# Patient Record
Sex: FEMALE | Race: BLACK OR AFRICAN AMERICAN | NOT HISPANIC OR LATINO | Employment: FULL TIME | RURAL
[De-identification: names, ages, dates, MRNs, and addresses within clinical notes are randomized per-mention and may not be internally consistent; named-entity substitution may affect disease eponyms.]

---

## 2017-03-30 ENCOUNTER — HISTORICAL (OUTPATIENT)
Dept: ADMINISTRATIVE | Facility: HOSPITAL | Age: 32
End: 2017-03-30

## 2017-03-31 LAB
LAB AP CLINICAL INFORMATION: NORMAL
LAB AP DIAGNOSIS - HISTORICAL: NORMAL
LAB AP GROSS PATHOLOGY - HISTORICAL: NORMAL
LAB AP SPECIMEN SUBMITTED - HISTORICAL: NORMAL

## 2021-04-08 ENCOUNTER — OFFICE VISIT (OUTPATIENT)
Dept: PAIN MEDICINE | Facility: CLINIC | Age: 36
End: 2021-04-08
Payer: COMMERCIAL

## 2021-04-08 VITALS
HEART RATE: 69 BPM | SYSTOLIC BLOOD PRESSURE: 125 MMHG | HEIGHT: 66 IN | WEIGHT: 166.38 LBS | RESPIRATION RATE: 20 BRPM | DIASTOLIC BLOOD PRESSURE: 73 MMHG | BODY MASS INDEX: 26.74 KG/M2

## 2021-04-08 DIAGNOSIS — G89.29 CHRONIC PAIN OF BOTH KNEES: Primary | Chronic | ICD-10-CM

## 2021-04-08 DIAGNOSIS — M25.562 CHRONIC PAIN OF BOTH KNEES: Primary | Chronic | ICD-10-CM

## 2021-04-08 DIAGNOSIS — G89.4 CHRONIC PAIN SYNDROME: Chronic | ICD-10-CM

## 2021-04-08 DIAGNOSIS — M79.10 MYALGIA: Chronic | ICD-10-CM

## 2021-04-08 DIAGNOSIS — M25.561 CHRONIC PAIN OF BOTH KNEES: Primary | Chronic | ICD-10-CM

## 2021-04-08 DIAGNOSIS — M54.50 CHRONIC BILATERAL LOW BACK PAIN WITHOUT SCIATICA: Chronic | ICD-10-CM

## 2021-04-08 DIAGNOSIS — G89.29 CHRONIC BILATERAL LOW BACK PAIN WITHOUT SCIATICA: Chronic | ICD-10-CM

## 2021-04-08 PROCEDURE — 99214 OFFICE O/P EST MOD 30 MIN: CPT | Mod: S$PBB,ICN,, | Performed by: PHYSICIAN ASSISTANT

## 2021-04-08 PROCEDURE — 99999 PR PBB SHADOW E&M-EST. PATIENT-LVL IV: CPT | Mod: PBBFAC,,, | Performed by: PHYSICIAN ASSISTANT

## 2021-04-08 PROCEDURE — 99999 PR PBB SHADOW E&M-EST. PATIENT-LVL IV: ICD-10-PCS | Mod: PBBFAC,,, | Performed by: PHYSICIAN ASSISTANT

## 2021-04-08 PROCEDURE — 99214 PR OFFICE/OUTPT VISIT, EST, LEVL IV, 30-39 MIN: ICD-10-PCS | Mod: S$PBB,ICN,, | Performed by: PHYSICIAN ASSISTANT

## 2021-04-08 PROCEDURE — 99214 OFFICE O/P EST MOD 30 MIN: CPT | Mod: PBBFAC | Performed by: PHYSICIAN ASSISTANT

## 2021-04-08 RX ORDER — PREGABALIN 50 MG/1
50 CAPSULE ORAL EVERY 12 HOURS
Qty: 60 CAPSULE | Refills: 2 | Status: SHIPPED | OUTPATIENT
Start: 2021-04-08 | End: 2021-07-08 | Stop reason: SDUPTHER

## 2021-04-08 RX ORDER — MIRTAZAPINE 30 MG/1
30 TABLET, FILM COATED ORAL
COMMUNITY
End: 2024-02-12 | Stop reason: SDUPTHER

## 2021-04-08 RX ORDER — ACETAMINOPHEN AND CODEINE PHOSPHATE 300; 30 MG/1; MG/1
1 TABLET ORAL 3 TIMES DAILY PRN
COMMUNITY
End: 2023-01-23 | Stop reason: SDUPTHER

## 2021-04-08 RX ORDER — ALBUTEROL SULFATE 90 UG/1
2 AEROSOL, METERED RESPIRATORY (INHALATION) EVERY 6 HOURS PRN
COMMUNITY

## 2021-04-08 RX ORDER — FAMOTIDINE 40 MG/1
40 TABLET, FILM COATED ORAL
COMMUNITY
End: 2023-11-03

## 2021-04-08 RX ORDER — OMEPRAZOLE 40 MG/1
40 CAPSULE, DELAYED RELEASE ORAL
COMMUNITY
End: 2023-11-03

## 2021-04-08 RX ORDER — ONDANSETRON 4 MG/1
4 TABLET, ORALLY DISINTEGRATING ORAL
COMMUNITY
End: 2024-02-27 | Stop reason: SDUPTHER

## 2021-04-08 RX ORDER — CELECOXIB 200 MG/1
200 CAPSULE ORAL DAILY
Qty: 30 CAPSULE | Refills: 2 | Status: SHIPPED | OUTPATIENT
Start: 2021-04-08 | End: 2021-07-08 | Stop reason: SDUPTHER

## 2021-04-08 RX ORDER — DULOXETIN HYDROCHLORIDE 30 MG/1
30 CAPSULE, DELAYED RELEASE ORAL DAILY
Qty: 30 CAPSULE | Refills: 2 | Status: SHIPPED | OUTPATIENT
Start: 2021-04-08 | End: 2021-07-08 | Stop reason: SDUPTHER

## 2021-07-08 ENCOUNTER — OFFICE VISIT (OUTPATIENT)
Dept: PAIN MEDICINE | Facility: CLINIC | Age: 36
End: 2021-07-08
Payer: COMMERCIAL

## 2021-07-08 VITALS
DIASTOLIC BLOOD PRESSURE: 80 MMHG | SYSTOLIC BLOOD PRESSURE: 123 MMHG | RESPIRATION RATE: 18 BRPM | WEIGHT: 166 LBS | HEIGHT: 66 IN | BODY MASS INDEX: 26.68 KG/M2 | HEART RATE: 75 BPM

## 2021-07-08 DIAGNOSIS — M79.10 MYALGIA: Chronic | ICD-10-CM

## 2021-07-08 DIAGNOSIS — M54.50 CHRONIC BILATERAL LOW BACK PAIN WITHOUT SCIATICA: Chronic | ICD-10-CM

## 2021-07-08 DIAGNOSIS — G89.29 CHRONIC BILATERAL LOW BACK PAIN WITHOUT SCIATICA: Chronic | ICD-10-CM

## 2021-07-08 DIAGNOSIS — M54.12 RADICULOPATHY OF CERVICAL REGION: Chronic | ICD-10-CM

## 2021-07-08 DIAGNOSIS — M25.562 CHRONIC PAIN OF BOTH KNEES: Primary | Chronic | ICD-10-CM

## 2021-07-08 DIAGNOSIS — G89.29 CHRONIC PAIN OF BOTH KNEES: Primary | Chronic | ICD-10-CM

## 2021-07-08 DIAGNOSIS — M25.561 CHRONIC PAIN OF BOTH KNEES: Primary | Chronic | ICD-10-CM

## 2021-07-08 PROCEDURE — 99215 OFFICE O/P EST HI 40 MIN: CPT | Mod: PBBFAC | Performed by: PHYSICIAN ASSISTANT

## 2021-07-08 PROCEDURE — 99214 PR OFFICE/OUTPT VISIT, EST, LEVL IV, 30-39 MIN: ICD-10-PCS | Mod: S$PBB,,, | Performed by: PHYSICIAN ASSISTANT

## 2021-07-08 PROCEDURE — 99214 OFFICE O/P EST MOD 30 MIN: CPT | Mod: S$PBB,,, | Performed by: PHYSICIAN ASSISTANT

## 2021-07-08 RX ORDER — PANTOPRAZOLE SODIUM 40 MG/1
40 TABLET, DELAYED RELEASE ORAL DAILY
COMMUNITY
End: 2024-02-27 | Stop reason: SDUPTHER

## 2021-07-08 RX ORDER — CELECOXIB 200 MG/1
200 CAPSULE ORAL DAILY
Qty: 30 CAPSULE | Refills: 0 | Status: SHIPPED | OUTPATIENT
Start: 2021-07-08 | End: 2021-08-07

## 2021-07-08 RX ORDER — PREGABALIN 50 MG/1
50 CAPSULE ORAL EVERY 12 HOURS
Qty: 60 CAPSULE | Refills: 0 | Status: SHIPPED | OUTPATIENT
Start: 2021-07-08 | End: 2022-11-30

## 2021-07-08 RX ORDER — DULOXETIN HYDROCHLORIDE 30 MG/1
30 CAPSULE, DELAYED RELEASE ORAL DAILY
Qty: 30 CAPSULE | Refills: 0 | Status: SHIPPED | OUTPATIENT
Start: 2021-07-08 | End: 2022-11-30

## 2021-07-30 ENCOUNTER — HOSPITAL ENCOUNTER (OUTPATIENT)
Dept: RADIOLOGY | Facility: HOSPITAL | Age: 36
Discharge: HOME | End: 2021-07-30
Attending: NURSE PRACTITIONER
Payer: COMMERCIAL

## 2021-07-30 DIAGNOSIS — Z11.1 SCREENING-PULMONARY TB: ICD-10-CM

## 2021-07-30 PROCEDURE — 71045 XR CHEST 1 VIEW: ICD-10-PCS | Mod: 26,,, | Performed by: RADIOLOGY

## 2021-07-30 PROCEDURE — 71045 X-RAY EXAM CHEST 1 VIEW: CPT | Mod: TC

## 2021-07-30 PROCEDURE — 71045 X-RAY EXAM CHEST 1 VIEW: CPT | Mod: 26,,, | Performed by: RADIOLOGY

## 2021-10-03 ENCOUNTER — HOSPITAL ENCOUNTER (EMERGENCY)
Facility: HOSPITAL | Age: 36
Discharge: HOME | End: 2021-10-03
Payer: COMMERCIAL

## 2021-10-03 VITALS
HEIGHT: 66 IN | TEMPERATURE: 98 F | HEART RATE: 73 BPM | OXYGEN SATURATION: 95 % | DIASTOLIC BLOOD PRESSURE: 84 MMHG | RESPIRATION RATE: 15 BRPM | BODY MASS INDEX: 24.91 KG/M2 | WEIGHT: 155 LBS | SYSTOLIC BLOOD PRESSURE: 123 MMHG

## 2021-10-03 DIAGNOSIS — M25.562 CHRONIC PAIN OF BOTH KNEES: Chronic | ICD-10-CM

## 2021-10-03 DIAGNOSIS — M79.10 MYALGIA: Chronic | ICD-10-CM

## 2021-10-03 DIAGNOSIS — M54.50 CHRONIC BILATERAL LOW BACK PAIN WITHOUT SCIATICA: Chronic | ICD-10-CM

## 2021-10-03 DIAGNOSIS — M54.12 RADICULOPATHY OF CERVICAL REGION: Chronic | ICD-10-CM

## 2021-10-03 DIAGNOSIS — G89.29 CHRONIC BILATERAL LOW BACK PAIN WITHOUT SCIATICA: Chronic | ICD-10-CM

## 2021-10-03 DIAGNOSIS — G89.29 CHRONIC PAIN OF BOTH KNEES: Chronic | ICD-10-CM

## 2021-10-03 DIAGNOSIS — G89.4 CHRONIC PAIN SYNDROME: Chronic | ICD-10-CM

## 2021-10-03 DIAGNOSIS — L73.9 FOLLICULITIS OF PERINEUM: ICD-10-CM

## 2021-10-03 DIAGNOSIS — J06.9 ACUTE UPPER RESPIRATORY INFECTION: Primary | ICD-10-CM

## 2021-10-03 DIAGNOSIS — M25.561 CHRONIC PAIN OF BOTH KNEES: Chronic | ICD-10-CM

## 2021-10-03 LAB
BILIRUB UR QL STRIP: NEGATIVE
CLARITY UR: CLEAR
COLOR UR: YELLOW
FLUAV AG UPPER RESP QL IA.RAPID: NEGATIVE
FLUBV AG UPPER RESP QL IA.RAPID: NEGATIVE
GLUCOSE UR STRIP-MCNC: NEGATIVE MG/DL
KETONES UR STRIP-SCNC: ABNORMAL MG/DL
LEUKOCYTE ESTERASE UR QL STRIP: NEGATIVE
NITRITE UR QL STRIP: NEGATIVE
PH UR STRIP: 5.5 PH UNITS
PROT UR QL STRIP: NEGATIVE
RBC # UR STRIP: NEGATIVE /UL
SARS-COV+SARS-COV-2 AG RESP QL IA.RAPID: NEGATIVE
SP GR UR STRIP: >=1.03
UROBILINOGEN UR STRIP-ACNC: 0.2 MG/DL

## 2021-10-03 PROCEDURE — 99283 EMERGENCY DEPT VISIT LOW MDM: CPT

## 2021-10-03 PROCEDURE — 87428 SARSCOV & INF VIR A&B AG IA: CPT | Performed by: NURSE PRACTITIONER

## 2021-10-03 PROCEDURE — 81003 URINALYSIS AUTO W/O SCOPE: CPT | Performed by: NURSE PRACTITIONER

## 2021-10-03 PROCEDURE — 99283 PR EMERGENCY DEPT VISIT,LEVEL III: ICD-10-PCS | Mod: ,,, | Performed by: NURSE PRACTITIONER

## 2021-10-03 PROCEDURE — 99283 EMERGENCY DEPT VISIT LOW MDM: CPT | Mod: ,,, | Performed by: NURSE PRACTITIONER

## 2021-10-03 RX ORDER — DEXAMETHASONE SODIUM PHOSPHATE 4 MG/ML
4 INJECTION, SOLUTION INTRA-ARTICULAR; INTRALESIONAL; INTRAMUSCULAR; INTRAVENOUS; SOFT TISSUE
Status: DISCONTINUED | OUTPATIENT
Start: 2021-10-03 | End: 2021-10-03

## 2021-10-03 RX ORDER — CEFDINIR 300 MG/1
300 CAPSULE ORAL 2 TIMES DAILY
Qty: 20 CAPSULE | Refills: 0 | Status: SHIPPED | OUTPATIENT
Start: 2021-10-03 | End: 2021-10-13

## 2021-10-28 ENCOUNTER — OFFICE VISIT (OUTPATIENT)
Dept: FAMILY MEDICINE | Facility: CLINIC | Age: 36
End: 2021-10-28
Payer: COMMERCIAL

## 2021-10-28 VITALS
DIASTOLIC BLOOD PRESSURE: 77 MMHG | HEIGHT: 66 IN | TEMPERATURE: 97 F | OXYGEN SATURATION: 98 % | BODY MASS INDEX: 25.81 KG/M2 | SYSTOLIC BLOOD PRESSURE: 120 MMHG | WEIGHT: 160.63 LBS | RESPIRATION RATE: 19 BRPM | HEART RATE: 70 BPM

## 2021-10-28 DIAGNOSIS — R39.9 UTI SYMPTOMS: Primary | ICD-10-CM

## 2021-10-28 DIAGNOSIS — K59.00 CONSTIPATION, UNSPECIFIED CONSTIPATION TYPE: ICD-10-CM

## 2021-10-28 DIAGNOSIS — Z11.3 SCREEN FOR STD (SEXUALLY TRANSMITTED DISEASE): ICD-10-CM

## 2021-10-28 DIAGNOSIS — R10.30 LOWER ABDOMINAL PAIN: ICD-10-CM

## 2021-10-28 LAB
ALBUMIN SERPL BCP-MCNC: 4 G/DL (ref 3.5–5)
ALBUMIN/GLOB SERPL: 1 {RATIO}
ALP SERPL-CCNC: 137 U/L (ref 37–98)
ALT SERPL W P-5'-P-CCNC: 24 U/L (ref 13–56)
ANION GAP SERPL CALCULATED.3IONS-SCNC: 8 MMOL/L (ref 7–16)
AST SERPL W P-5'-P-CCNC: 17 U/L (ref 15–37)
BASOPHILS # BLD AUTO: 0.01 K/UL (ref 0–0.2)
BASOPHILS NFR BLD AUTO: 0.2 % (ref 0–1)
BILIRUB SERPL-MCNC: 0.2 MG/DL (ref 0–1.2)
BILIRUB SERPL-MCNC: NEGATIVE MG/DL
BLOOD URINE, POC: NEGATIVE
BUN SERPL-MCNC: 10 MG/DL (ref 7–18)
BUN/CREAT SERPL: 12 (ref 6–20)
CALCIUM SERPL-MCNC: 9.9 MG/DL (ref 8.5–10.1)
CHLORIDE SERPL-SCNC: 106 MMOL/L (ref 98–107)
CO2 SERPL-SCNC: 29 MMOL/L (ref 21–32)
COLOR, POC UA: YELLOW
CREAT SERPL-MCNC: 0.86 MG/DL (ref 0.55–1.02)
DIFFERENTIAL METHOD BLD: ABNORMAL
EOSINOPHIL # BLD AUTO: 0.02 K/UL (ref 0–0.5)
EOSINOPHIL NFR BLD AUTO: 0.5 % (ref 1–4)
ERYTHROCYTE [DISTWIDTH] IN BLOOD BY AUTOMATED COUNT: 13.6 % (ref 11.5–14.5)
GLOBULIN SER-MCNC: 4.2 G/DL (ref 2–4)
GLUCOSE SERPL-MCNC: 92 MG/DL (ref 74–106)
GLUCOSE UR QL STRIP: NEGATIVE
HCT VFR BLD AUTO: 40.5 % (ref 38–47)
HGB BLD-MCNC: 12.2 G/DL (ref 12–16)
HIV 1+O+2 AB SERPL QL: NORMAL
IMM GRANULOCYTES # BLD AUTO: 0 K/UL (ref 0–0.04)
IMM GRANULOCYTES NFR BLD: 0 % (ref 0–0.4)
KETONES UR QL STRIP: NEGATIVE
LEUKOCYTE ESTERASE URINE, POC: NEGATIVE
LYMPHOCYTES # BLD AUTO: 1.79 K/UL (ref 1–4.8)
LYMPHOCYTES NFR BLD AUTO: 43.2 % (ref 27–41)
MCH RBC QN AUTO: 22.7 PG (ref 27–31)
MCHC RBC AUTO-ENTMCNC: 30.1 G/DL (ref 32–36)
MCV RBC AUTO: 75.3 FL (ref 80–96)
MONOCYTES # BLD AUTO: 0.23 K/UL (ref 0–0.8)
MONOCYTES NFR BLD AUTO: 5.6 % (ref 2–6)
MPC BLD CALC-MCNC: 10.1 FL (ref 9.4–12.4)
NEUTROPHILS # BLD AUTO: 2.09 K/UL (ref 1.8–7.7)
NEUTROPHILS NFR BLD AUTO: 50.5 % (ref 53–65)
NITRITE, POC UA: NEGATIVE
NRBC # BLD AUTO: 0 X10E3/UL
NRBC, AUTO (.00): 0 %
PH, POC UA: 5.5
PLATELET # BLD AUTO: 370 K/UL (ref 150–400)
POTASSIUM SERPL-SCNC: 4.9 MMOL/L (ref 3.5–5.1)
PROT SERPL-MCNC: 8.2 G/DL (ref 6.4–8.2)
PROTEIN, POC: NEGATIVE
RBC # BLD AUTO: 5.38 M/UL (ref 4.2–5.4)
SODIUM SERPL-SCNC: 138 MMOL/L (ref 136–145)
SPECIFIC GRAVITY, POC UA: 1.02
SYPHILIS AB INTERPRETATION: NORMAL
UROBILINOGEN, POC UA: 0.2
WBC # BLD AUTO: 4.14 K/UL (ref 4.5–11)

## 2021-10-28 PROCEDURE — 3008F BODY MASS INDEX DOCD: CPT | Mod: CPTII,,, | Performed by: NURSE PRACTITIONER

## 2021-10-28 PROCEDURE — 81003 URINALYSIS AUTO W/O SCOPE: CPT | Mod: QW,,, | Performed by: NURSE PRACTITIONER

## 2021-10-28 PROCEDURE — 81003 POCT URINALYSIS W/O SCOPE: ICD-10-PCS | Mod: QW,,, | Performed by: NURSE PRACTITIONER

## 2021-10-28 PROCEDURE — 3074F PR MOST RECENT SYSTOLIC BLOOD PRESSURE < 130 MM HG: ICD-10-PCS | Mod: CPTII,,, | Performed by: NURSE PRACTITIONER

## 2021-10-28 PROCEDURE — 85025 CBC WITH DIFFERENTIAL: ICD-10-PCS | Mod: ,,, | Performed by: CLINICAL MEDICAL LABORATORY

## 2021-10-28 PROCEDURE — 86780 TREPONEMA PALLIDUM: CPT | Mod: ,,, | Performed by: CLINICAL MEDICAL LABORATORY

## 2021-10-28 PROCEDURE — 86780 TREPONEMA PALLIDUM (SYPHILIS) ANTIBODY: ICD-10-PCS | Mod: ,,, | Performed by: CLINICAL MEDICAL LABORATORY

## 2021-10-28 PROCEDURE — 87389 HIV-1 AG W/HIV-1&-2 AB AG IA: CPT | Mod: ,,, | Performed by: CLINICAL MEDICAL LABORATORY

## 2021-10-28 PROCEDURE — 86696 HERPES SIMPLEX TYPE 2 TEST: CPT | Mod: ,,, | Performed by: CLINICAL MEDICAL LABORATORY

## 2021-10-28 PROCEDURE — 86695 HERPES SIMPLEX TYPE 1 TEST: CPT | Mod: ,,, | Performed by: CLINICAL MEDICAL LABORATORY

## 2021-10-28 PROCEDURE — 87491 CHLMYD TRACH DNA AMP PROBE: CPT | Mod: ,,, | Performed by: CLINICAL MEDICAL LABORATORY

## 2021-10-28 PROCEDURE — 87591 N.GONORRHOEAE DNA AMP PROB: CPT | Mod: ,,, | Performed by: CLINICAL MEDICAL LABORATORY

## 2021-10-28 PROCEDURE — 3078F PR MOST RECENT DIASTOLIC BLOOD PRESSURE < 80 MM HG: ICD-10-PCS | Mod: CPTII,,, | Performed by: NURSE PRACTITIONER

## 2021-10-28 PROCEDURE — 86695 HERPES SIMPLEX 1 & 2 IGG: ICD-10-PCS | Mod: ,,, | Performed by: CLINICAL MEDICAL LABORATORY

## 2021-10-28 PROCEDURE — 99204 OFFICE O/P NEW MOD 45 MIN: CPT | Mod: ,,, | Performed by: NURSE PRACTITIONER

## 2021-10-28 PROCEDURE — 87389 HIV 1 / 2 ANTIBODY: ICD-10-PCS | Mod: ,,, | Performed by: CLINICAL MEDICAL LABORATORY

## 2021-10-28 PROCEDURE — 87086 URINE CULTURE/COLONY COUNT: CPT | Mod: ,,, | Performed by: CLINICAL MEDICAL LABORATORY

## 2021-10-28 PROCEDURE — 3078F DIAST BP <80 MM HG: CPT | Mod: CPTII,,, | Performed by: NURSE PRACTITIONER

## 2021-10-28 PROCEDURE — 1159F PR MEDICATION LIST DOCUMENTED IN MEDICAL RECORD: ICD-10-PCS | Mod: CPTII,,, | Performed by: NURSE PRACTITIONER

## 2021-10-28 PROCEDURE — 1160F PR REVIEW ALL MEDS BY PRESCRIBER/CLIN PHARMACIST DOCUMENTED: ICD-10-PCS | Mod: CPTII,,, | Performed by: NURSE PRACTITIONER

## 2021-10-28 PROCEDURE — 87086 CULTURE, URINE: ICD-10-PCS | Mod: ,,, | Performed by: CLINICAL MEDICAL LABORATORY

## 2021-10-28 PROCEDURE — 3008F PR BODY MASS INDEX (BMI) DOCUMENTED: ICD-10-PCS | Mod: CPTII,,, | Performed by: NURSE PRACTITIONER

## 2021-10-28 PROCEDURE — 3074F SYST BP LT 130 MM HG: CPT | Mod: CPTII,,, | Performed by: NURSE PRACTITIONER

## 2021-10-28 PROCEDURE — 80053 COMPREHENSIVE METABOLIC PANEL: ICD-10-PCS | Mod: ,,, | Performed by: CLINICAL MEDICAL LABORATORY

## 2021-10-28 PROCEDURE — 80053 COMPREHEN METABOLIC PANEL: CPT | Mod: ,,, | Performed by: CLINICAL MEDICAL LABORATORY

## 2021-10-28 PROCEDURE — 1160F RVW MEDS BY RX/DR IN RCRD: CPT | Mod: CPTII,,, | Performed by: NURSE PRACTITIONER

## 2021-10-28 PROCEDURE — 87591 CHLAMYDIA/GONORRHOEAE(GC), PCR: ICD-10-PCS | Mod: ,,, | Performed by: CLINICAL MEDICAL LABORATORY

## 2021-10-28 PROCEDURE — 1159F MED LIST DOCD IN RCRD: CPT | Mod: CPTII,,, | Performed by: NURSE PRACTITIONER

## 2021-10-28 PROCEDURE — 85025 COMPLETE CBC W/AUTO DIFF WBC: CPT | Mod: ,,, | Performed by: CLINICAL MEDICAL LABORATORY

## 2021-10-28 PROCEDURE — 99204 PR OFFICE/OUTPT VISIT, NEW, LEVL IV, 45-59 MIN: ICD-10-PCS | Mod: ,,, | Performed by: NURSE PRACTITIONER

## 2021-10-28 PROCEDURE — 87491 CHLAMYDIA/GONORRHOEAE(GC), PCR: ICD-10-PCS | Mod: ,,, | Performed by: CLINICAL MEDICAL LABORATORY

## 2021-10-28 PROCEDURE — 86696 HERPES SIMPLEX 1 & 2 IGG: ICD-10-PCS | Mod: ,,, | Performed by: CLINICAL MEDICAL LABORATORY

## 2021-10-28 RX ORDER — LACTULOSE 10 G/15ML
30 SOLUTION ORAL 2 TIMES DAILY PRN
Qty: 420 ML | Refills: 0 | Status: SHIPPED | OUTPATIENT
Start: 2021-10-28 | End: 2023-11-03

## 2021-10-28 RX ORDER — SULFAMETHOXAZOLE AND TRIMETHOPRIM 800; 160 MG/1; MG/1
1 TABLET ORAL 2 TIMES DAILY
Qty: 20 TABLET | Refills: 0 | Status: SHIPPED | OUTPATIENT
Start: 2021-10-28 | End: 2021-11-07

## 2021-10-29 LAB
CHLAMYDIA BY PCR: NEGATIVE
N. GONORRHOEAE (GC) BY PCR: NEGATIVE

## 2021-10-30 LAB — UA COMPLETE W REFLEX CULTURE PNL UR: NORMAL

## 2021-11-02 LAB
HSV TYPE 1 AB IGG INDEX: 8.92
HSV TYPE 2 AB IGG INDEX: 4.58
HSV1 IGG SER QL: POSITIVE
HSV2 IGG SER QL: POSITIVE

## 2021-11-05 ENCOUNTER — CLINICAL SUPPORT (OUTPATIENT)
Dept: FAMILY MEDICINE | Facility: CLINIC | Age: 36
End: 2021-11-05

## 2022-01-25 ENCOUNTER — OFFICE VISIT (OUTPATIENT)
Dept: FAMILY MEDICINE | Facility: CLINIC | Age: 37
End: 2022-01-25
Payer: COMMERCIAL

## 2022-01-25 VITALS
HEART RATE: 71 BPM | OXYGEN SATURATION: 99 % | SYSTOLIC BLOOD PRESSURE: 118 MMHG | RESPIRATION RATE: 18 BRPM | WEIGHT: 164 LBS | DIASTOLIC BLOOD PRESSURE: 79 MMHG | TEMPERATURE: 98 F | HEIGHT: 66 IN | BODY MASS INDEX: 26.36 KG/M2

## 2022-01-25 DIAGNOSIS — N30.00 ACUTE CYSTITIS WITHOUT HEMATURIA: ICD-10-CM

## 2022-01-25 DIAGNOSIS — N89.8 VAGINAL DISCHARGE: ICD-10-CM

## 2022-01-25 DIAGNOSIS — R30.0 DYSURIA: Primary | ICD-10-CM

## 2022-01-25 DIAGNOSIS — R10.9 FLANK PAIN: ICD-10-CM

## 2022-01-25 LAB
ALBUMIN SERPL BCP-MCNC: 3.6 G/DL (ref 3.5–5)
ALBUMIN/GLOB SERPL: 0.8 {RATIO}
ALP SERPL-CCNC: 137 U/L (ref 37–98)
ALT SERPL W P-5'-P-CCNC: 28 U/L (ref 13–56)
ANION GAP SERPL CALCULATED.3IONS-SCNC: 10 MMOL/L (ref 7–16)
AST SERPL W P-5'-P-CCNC: 16 U/L (ref 15–37)
BASOPHILS # BLD AUTO: 0.01 K/UL (ref 0–0.2)
BASOPHILS NFR BLD AUTO: 0.2 % (ref 0–1)
BILIRUB SERPL-MCNC: 0.3 MG/DL (ref 0–1.2)
BILIRUB UR QL STRIP: NEGATIVE
BUN SERPL-MCNC: 9 MG/DL (ref 7–18)
BUN/CREAT SERPL: 11 (ref 6–20)
CALCIUM SERPL-MCNC: 9.6 MG/DL (ref 8.5–10.1)
CHLORIDE SERPL-SCNC: 106 MMOL/L (ref 98–107)
CLARITY UR: CLEAR
CO2 SERPL-SCNC: 31 MMOL/L (ref 21–32)
COLOR UR: YELLOW
CREAT SERPL-MCNC: 0.81 MG/DL (ref 0.55–1.02)
DIFFERENTIAL METHOD BLD: ABNORMAL
EOSINOPHIL # BLD AUTO: 0.03 K/UL (ref 0–0.5)
EOSINOPHIL NFR BLD AUTO: 0.6 % (ref 1–4)
ERYTHROCYTE [DISTWIDTH] IN BLOOD BY AUTOMATED COUNT: 14.6 % (ref 11.5–14.5)
GLOBULIN SER-MCNC: 4.6 G/DL (ref 2–4)
GLUCOSE SERPL-MCNC: 71 MG/DL (ref 74–106)
GLUCOSE UR STRIP-MCNC: NEGATIVE MG/DL
HCT VFR BLD AUTO: 40.4 % (ref 38–47)
HGB BLD-MCNC: 11.7 G/DL (ref 12–16)
HIV 1+O+2 AB SERPL QL: NORMAL
IMM GRANULOCYTES # BLD AUTO: 0.01 K/UL (ref 0–0.04)
IMM GRANULOCYTES NFR BLD: 0.2 % (ref 0–0.4)
KETONES UR STRIP-SCNC: NEGATIVE MG/DL
LEUKOCYTE ESTERASE UR QL STRIP: NEGATIVE
LYMPHOCYTES # BLD AUTO: 1.95 K/UL (ref 1–4.8)
LYMPHOCYTES NFR BLD AUTO: 41.8 % (ref 27–41)
MCH RBC QN AUTO: 22.5 PG (ref 27–31)
MCHC RBC AUTO-ENTMCNC: 29 G/DL (ref 32–36)
MCV RBC AUTO: 77.5 FL (ref 80–96)
MONOCYTES # BLD AUTO: 0.28 K/UL (ref 0–0.8)
MONOCYTES NFR BLD AUTO: 6 % (ref 2–6)
MPC BLD CALC-MCNC: 9.6 FL (ref 9.4–12.4)
NEUTROPHILS # BLD AUTO: 2.38 K/UL (ref 1.8–7.7)
NEUTROPHILS NFR BLD AUTO: 51.2 % (ref 53–65)
NITRITE UR QL STRIP: NEGATIVE
NRBC # BLD AUTO: 0 X10E3/UL
NRBC, AUTO (.00): 0 %
PH UR STRIP: 7 PH UNITS
PLATELET # BLD AUTO: 405 K/UL (ref 150–400)
POTASSIUM SERPL-SCNC: 4 MMOL/L (ref 3.5–5.1)
PROT SERPL-MCNC: 8.2 G/DL (ref 6.4–8.2)
PROT UR QL STRIP: NEGATIVE
RBC # BLD AUTO: 5.21 M/UL (ref 4.2–5.4)
RBC # UR STRIP: NEGATIVE /UL
SODIUM SERPL-SCNC: 143 MMOL/L (ref 136–145)
SP GR UR STRIP: 1.02
SYPHILIS AB INTERPRETATION: NORMAL
UROBILINOGEN UR STRIP-ACNC: 0.2 MG/DL
WBC # BLD AUTO: 4.66 K/UL (ref 4.5–11)

## 2022-01-25 PROCEDURE — 87491 CHLMYD TRACH DNA AMP PROBE: CPT | Mod: ,,, | Performed by: CLINICAL MEDICAL LABORATORY

## 2022-01-25 PROCEDURE — 86696 HERPES SIMPLEX TYPE 2 TEST: CPT | Mod: ,,, | Performed by: CLINICAL MEDICAL LABORATORY

## 2022-01-25 PROCEDURE — 86695 HERPES SIMPLEX 1 & 2 IGG: ICD-10-PCS | Mod: ,,, | Performed by: CLINICAL MEDICAL LABORATORY

## 2022-01-25 PROCEDURE — 80053 COMPREHENSIVE METABOLIC PANEL: ICD-10-PCS | Mod: ,,, | Performed by: CLINICAL MEDICAL LABORATORY

## 2022-01-25 PROCEDURE — 87389 HIV-1 AG W/HIV-1&-2 AB AG IA: CPT | Mod: ,,, | Performed by: CLINICAL MEDICAL LABORATORY

## 2022-01-25 PROCEDURE — 80053 COMPREHEN METABOLIC PANEL: CPT | Mod: ,,, | Performed by: CLINICAL MEDICAL LABORATORY

## 2022-01-25 PROCEDURE — 99214 PR OFFICE/OUTPT VISIT, EST, LEVL IV, 30-39 MIN: ICD-10-PCS | Mod: GC,,, | Performed by: FAMILY MEDICINE

## 2022-01-25 PROCEDURE — 81003 URINALYSIS AUTO W/O SCOPE: CPT | Mod: QW,,, | Performed by: CLINICAL MEDICAL LABORATORY

## 2022-01-25 PROCEDURE — 85025 COMPLETE CBC W/AUTO DIFF WBC: CPT | Mod: ,,, | Performed by: CLINICAL MEDICAL LABORATORY

## 2022-01-25 PROCEDURE — 86695 HERPES SIMPLEX TYPE 1 TEST: CPT | Mod: ,,, | Performed by: CLINICAL MEDICAL LABORATORY

## 2022-01-25 PROCEDURE — 87591 N.GONORRHOEAE DNA AMP PROB: CPT | Mod: ,,, | Performed by: CLINICAL MEDICAL LABORATORY

## 2022-01-25 PROCEDURE — 86694 HERPES SIMPLEX 1 & 2 IGM: ICD-10-PCS | Mod: ,,, | Performed by: CLINICAL MEDICAL LABORATORY

## 2022-01-25 PROCEDURE — 86780 TREPONEMA PALLIDUM: CPT | Mod: ,,, | Performed by: CLINICAL MEDICAL LABORATORY

## 2022-01-25 PROCEDURE — 87389 HIV 1 / 2 ANTIBODY: ICD-10-PCS | Mod: ,,, | Performed by: CLINICAL MEDICAL LABORATORY

## 2022-01-25 PROCEDURE — 86694 HERPES SIMPLEX NES ANTBDY: CPT | Mod: ,,, | Performed by: CLINICAL MEDICAL LABORATORY

## 2022-01-25 PROCEDURE — 87591 CHLAMYDIA/GONORRHOEAE(GC), PCR: ICD-10-PCS | Mod: ,,, | Performed by: CLINICAL MEDICAL LABORATORY

## 2022-01-25 PROCEDURE — 85025 CBC WITH DIFFERENTIAL: ICD-10-PCS | Mod: ,,, | Performed by: CLINICAL MEDICAL LABORATORY

## 2022-01-25 PROCEDURE — 99214 OFFICE O/P EST MOD 30 MIN: CPT | Mod: GC,,, | Performed by: FAMILY MEDICINE

## 2022-01-25 PROCEDURE — 81003 URINALYSIS, REFLEX TO URINE CULTURE: ICD-10-PCS | Mod: QW,,, | Performed by: CLINICAL MEDICAL LABORATORY

## 2022-01-25 PROCEDURE — 87491 CHLAMYDIA/GONORRHOEAE(GC), PCR: ICD-10-PCS | Mod: ,,, | Performed by: CLINICAL MEDICAL LABORATORY

## 2022-01-25 PROCEDURE — 86696 HERPES SIMPLEX 1 & 2 IGG: ICD-10-PCS | Mod: ,,, | Performed by: CLINICAL MEDICAL LABORATORY

## 2022-01-25 PROCEDURE — 86780 TREPONEMA PALLIDUM (SYPHILIS) ANTIBODY: ICD-10-PCS | Mod: ,,, | Performed by: CLINICAL MEDICAL LABORATORY

## 2022-01-25 RX ORDER — NITROFURANTOIN 25; 75 MG/1; MG/1
100 CAPSULE ORAL 2 TIMES DAILY
Qty: 10 CAPSULE | Refills: 0 | Status: SHIPPED | OUTPATIENT
Start: 2022-01-25 | End: 2022-01-30

## 2022-01-26 LAB
CHLAMYDIA BY PCR: NEGATIVE
N. GONORRHOEAE (GC) BY PCR: NEGATIVE

## 2022-01-27 LAB
HSV IGM SER QL IA: ABNORMAL
HSV TYPE 1 AB IGG INDEX: >8
HSV TYPE 2 AB IGG INDEX: 3.38
HSV1 IGG SER QL: POSITIVE
HSV2 IGG SER QL: POSITIVE

## 2022-02-03 PROBLEM — N30.00 ACUTE CYSTITIS WITHOUT HEMATURIA: Status: ACTIVE | Noted: 2022-02-03

## 2022-02-03 PROBLEM — R10.9 FLANK PAIN: Status: ACTIVE | Noted: 2022-02-03

## 2022-02-03 PROBLEM — N89.8 VAGINAL DISCHARGE: Status: ACTIVE | Noted: 2022-02-03

## 2022-02-03 PROBLEM — R30.0 DYSURIA: Status: ACTIVE | Noted: 2022-02-03

## 2022-02-03 NOTE — PROGRESS NOTES
"  Subjective:       Patient ID: Keshia Shepherd is a 37 y.o. female.    Chief Complaint: Dysuria, Abdominal Cramping, Pressure when urinating, and Fatigue (Symptoms presented about 2 weeks ago. )    37 y.o. female presented to the clinic c/o pain when urinating which started 2 weeks ago and is described and burning and pressure. Pt reports pain is better with warm water and worse with urinating. PT reports assiociated symptoms of fatigue, abdominal cramping, bilateral lower back pain, and flank pain. PT is also c/o white discharge and reports there is a "hard ball" in her groin. PT reports vaginal itching and discharge. Denies any N/V, fever, chills, vaginal bleeding or any other complaints at this time. Pt reports she had a yeast infection 1 year ago. A0. LNMP: reports hysterectomy. Similar symptoms previously 3 years ago. PCP at [facility]: Dr. Maurilio Navarro.      Current Outpatient Medications:     albuterol (PROVENTIL/VENTOLIN HFA) 90 mcg/actuation inhaler, Inhale 2 puffs into the lungs every 6 (six) hours as needed., Disp: , Rfl:     famotidine (PEPCID) 40 MG tablet, Take 40 mg by mouth., Disp: , Rfl:     lactulose (CHRONULAC) 20 gram/30 mL Soln, Take 45 mLs (30 g total) by mouth 2 (two) times daily as needed (constipation)., Disp: 420 mL, Rfl: 0    omeprazole (PRILOSEC) 40 MG capsule, Take 40 mg by mouth., Disp: , Rfl:     ondansetron (ZOFRAN-ODT) 4 MG TbDL, Take 4 mg by mouth., Disp: , Rfl:     acetaminophen-codeine 300-30mg (TYLENOL #3) 300-30 mg Tab, Take 1 tablet by mouth 3 (three) times daily as needed., Disp: , Rfl:     DULoxetine (CYMBALTA) 30 MG capsule, Take 1 capsule (30 mg total) by mouth once daily., Disp: 30 capsule, Rfl: 0    mirtazapine (REMERON) 30 MG tablet, Take 30 mg by mouth., Disp: , Rfl:     pantoprazole (PROTONIX) 40 MG tablet, Take 40 mg by mouth once daily., Disp: , Rfl:     pregabalin (LYRICA) 50 MG capsule, Take 1 capsule (50 mg total) by mouth every 12 (twelve) hours., " Disp: 60 capsule, Rfl: 0    Review of patient's allergies indicates:   Allergen Reactions    Opioids - morphine analogues     Pcn [penicillins]        Past Medical History:   Diagnosis Date    Asthma     Cervical radiculopathy     Chronic pain syndrome     Depressive disorder     Fibromyalgia     GERD (gastroesophageal reflux disease)     Hyperlipidemia     Osteoarthritis     Palpitations     Radiculopathy of cervical region        Past Surgical History:   Procedure Laterality Date    CARPAL TUNNEL RELEASE Bilateral     HYSTERECTOMY         Family History   Problem Relation Age of Onset    Arthritis Mother     Cancer Mother     Depression Mother     Diabetes Mother     Heart disease Mother     Hypertension Mother     Stroke Mother     Asthma Daughter     Arthritis Maternal Grandmother     Cancer Maternal Grandmother     Depression Maternal Grandmother     Diabetes Maternal Grandmother     Hypertension Maternal Grandmother     Heart disease Maternal Grandfather     Hypertension Maternal Grandfather     Cancer Paternal Grandmother     Depression Paternal Grandmother     Diabetes Paternal Grandmother        Social History     Tobacco Use    Smoking status: Never Smoker    Smokeless tobacco: Never Used   Substance Use Topics    Alcohol use: Never    Drug use: Not Currently       Review of Systems   Constitutional: Negative for chills and fever.   HENT: Negative for hearing loss and trouble swallowing.    Eyes: Negative for visual disturbance.   Respiratory: Negative for cough, chest tightness and shortness of breath.    Cardiovascular: Negative for chest pain and leg swelling.   Gastrointestinal: Negative for abdominal pain, diarrhea, nausea and vomiting.   Genitourinary: Positive for difficulty urinating, dysuria, flank pain and vaginal discharge. Negative for hematuria.   Musculoskeletal: Positive for back pain. Negative for myalgias.   Integumentary:  Negative for rash.  "  Neurological: Negative for dizziness, weakness, light-headedness and headaches.   Psychiatric/Behavioral: Negative for suicidal ideas. The patient is not hyperactive.            Objective:      Vitals:    01/25/22 0832   BP: 118/79   BP Location: Right arm   Patient Position: Sitting   Pulse: 71   Resp: 18   Temp: 97.9 °F (36.6 °C)   TempSrc: Temporal   SpO2: 99%   Weight: 74.4 kg (164 lb)   Height: 5' 6" (1.676 m)     Physical Exam  Vitals reviewed. Exam conducted with a chaperone present.   Constitutional:       General: She is not in acute distress.     Appearance: Normal appearance. She is not toxic-appearing.   HENT:      Head: Normocephalic and atraumatic.      Right Ear: External ear normal.      Left Ear: External ear normal.      Nose: Nose normal.      Mouth/Throat:      Mouth: Mucous membranes are moist.      Pharynx: Oropharynx is clear.   Eyes:      General: No scleral icterus.     Extraocular Movements: Extraocular movements intact.      Conjunctiva/sclera: Conjunctivae normal.      Pupils: Pupils are equal, round, and reactive to light.   Cardiovascular:      Rate and Rhythm: Normal rate and regular rhythm.      Pulses: Normal pulses.      Heart sounds: Normal heart sounds. No murmur heard.      Pulmonary:      Effort: Pulmonary effort is normal. No respiratory distress.      Breath sounds: Normal breath sounds. No wheezing, rhonchi or rales.   Abdominal:      General: Abdomen is flat. Bowel sounds are normal. There is no distension.      Palpations: Abdomen is soft.      Tenderness: There is no abdominal tenderness. There is no guarding or rebound.   Genitourinary:     Exam position: Lithotomy position.      Pubic Area: No rash or pubic lice.       Labia:         Right: No rash or tenderness.         Left: No rash or tenderness.       Vagina: Normal.      Cervix: Discharge present. No cervical bleeding.      Adnexa: Right adnexa normal and left adnexa normal.        Right: No mass or tenderness.    "       Left: No mass or tenderness.        Rectum: Normal.   Musculoskeletal:         General: No swelling. Normal range of motion.      Cervical back: Normal range of motion and neck supple. No rigidity.   Skin:     General: Skin is warm and dry.      Capillary Refill: Capillary refill takes less than 2 seconds.      Findings: No rash.   Neurological:      General: No focal deficit present.      Mental Status: She is alert and oriented to person, place, and time. Mental status is at baseline.   Psychiatric:         Mood and Affect: Mood normal.         Behavior: Behavior normal.         Thought Content: Thought content normal.         Judgment: Judgment normal.           Lab Results   Component Value Date    WBC 4.66 01/25/2022    HGB 11.7 (L) 01/25/2022    HCT 40.4 01/25/2022     (H) 01/25/2022    ALT 28 01/25/2022    AST 16 01/25/2022     01/25/2022    K 4.0 01/25/2022     01/25/2022    CREATININE 0.81 01/25/2022    BUN 9 01/25/2022    CO2 31 01/25/2022      Assessment:       1. Dysuria    2. Vaginal discharge    3. Flank pain    4. Acute cystitis without hematuria        Plan:         Problem List Items Addressed This Visit        Renal/    Dysuria - Primary    Relevant Orders    Urinalysis, Reflex to Urine Culture Urine, Clean Catch (Completed)    CBC Auto Differential (Completed)    Comprehensive Metabolic Panel (Completed)    Vaginal discharge    Relevant Orders    Chlamydia/GC, PCR (Completed)    HIV 1/2 Ag/Ab (4th Gen) (Completed)    Syphilis Antibody with reflex to RPR (Completed)    HSV 1 & 2, IgM (Completed)    HSV 1 & 2, IgG (Completed)    Acute cystitis without hematuria       GI    Flank pain    Relevant Orders    Urinalysis, Reflex to Urine Culture Urine, Clean Catch (Completed)    CBC Auto Differential (Completed)    Comprehensive Metabolic Panel (Completed)            Follow up if symptoms worsen or fail to improve.    Lenorejuancarlos Vu, DO

## 2022-02-22 ENCOUNTER — OFFICE VISIT (OUTPATIENT)
Dept: FAMILY MEDICINE | Facility: CLINIC | Age: 37
End: 2022-02-22
Payer: COMMERCIAL

## 2022-02-22 VITALS
TEMPERATURE: 97 F | WEIGHT: 162 LBS | HEART RATE: 84 BPM | OXYGEN SATURATION: 97 % | RESPIRATION RATE: 20 BRPM | HEIGHT: 66 IN | DIASTOLIC BLOOD PRESSURE: 80 MMHG | SYSTOLIC BLOOD PRESSURE: 124 MMHG | BODY MASS INDEX: 26.03 KG/M2

## 2022-02-22 DIAGNOSIS — L72.0 EPIDERMAL INCLUSION CYST: ICD-10-CM

## 2022-02-22 DIAGNOSIS — B00.9 HSV (HERPES SIMPLEX VIRUS) INFECTION: Primary | ICD-10-CM

## 2022-02-22 PROCEDURE — 86694 HERPES SIMPLEX 1 & 2 IGM: ICD-10-PCS | Mod: ,,, | Performed by: CLINICAL MEDICAL LABORATORY

## 2022-02-22 PROCEDURE — 99213 OFFICE O/P EST LOW 20 MIN: CPT | Mod: GC,,, | Performed by: FAMILY MEDICINE

## 2022-02-22 PROCEDURE — 86694 HERPES SIMPLEX NES ANTBDY: CPT | Mod: ,,, | Performed by: CLINICAL MEDICAL LABORATORY

## 2022-02-22 PROCEDURE — 99213 PR OFFICE/OUTPT VISIT, EST, LEVL III, 20-29 MIN: ICD-10-PCS | Mod: GC,,, | Performed by: FAMILY MEDICINE

## 2022-02-22 NOTE — PROGRESS NOTES
Subjective:       Patient ID: Keshia Shepherd is a 37 y.o. female.    Chief Complaint: Follow-up (Lab Results)    37 y.o. female presented to the clinic for review of labs today. Pt with positive HSV IgG. Pt reports she was told by her Ob/gyn several years ago about her positive HSV status. Denies any symptoms or oral or labial lesions. PT is still complaining of a cyst on her mons pubis which she states is getting larger and is bothersome during her activities of daily living. Pt states she was so tired of it that she tried to insert a needle into it but was not able to express anything. PT denies any pain, discharge, or any other complaints at this time. Spoke to patient about removal of cyst on Monday with Dr. Lemus and patient states she would like that.         Current Outpatient Medications:     famotidine (PEPCID) 40 MG tablet, Take 40 mg by mouth., Disp: , Rfl:     lactulose (CHRONULAC) 20 gram/30 mL Soln, Take 45 mLs (30 g total) by mouth 2 (two) times daily as needed (constipation)., Disp: 420 mL, Rfl: 0    mirtazapine (REMERON) 30 MG tablet, Take 30 mg by mouth., Disp: , Rfl:     omeprazole (PRILOSEC) 40 MG capsule, Take 40 mg by mouth., Disp: , Rfl:     ondansetron (ZOFRAN-ODT) 4 MG TbDL, Take 4 mg by mouth., Disp: , Rfl:     pantoprazole (PROTONIX) 40 MG tablet, Take 40 mg by mouth once daily., Disp: , Rfl:     acetaminophen-codeine 300-30mg (TYLENOL #3) 300-30 mg Tab, Take 1 tablet by mouth 3 (three) times daily as needed., Disp: , Rfl:     albuterol (PROVENTIL/VENTOLIN HFA) 90 mcg/actuation inhaler, Inhale 2 puffs into the lungs every 6 (six) hours as needed., Disp: , Rfl:     clindamycin (CLEOCIN) 300 MG capsule, Take 1 capsule (300 mg total) by mouth every 6 (six) hours. for 7 days, Disp: 28 capsule, Rfl: 0    DULoxetine (CYMBALTA) 30 MG capsule, Take 1 capsule (30 mg total) by mouth once daily., Disp: 30 capsule, Rfl: 0    neomycin-bacitracin-polymyxin (TRIPLE ANTIBIOTIC) ointment,  Apply topically 2 (two) times daily., Disp: 28 g, Rfl: 0    pregabalin (LYRICA) 50 MG capsule, Take 1 capsule (50 mg total) by mouth every 12 (twelve) hours., Disp: 60 capsule, Rfl: 0    Current Facility-Administered Medications:     LIDOcaine-EPINEPHrine 1%-1:100,000 injection 1 mL, 1 mL, Intradermal, 1 time in Clinic/HOD, Erika Torres MD    Review of patient's allergies indicates:   Allergen Reactions    Opioids - morphine analogues     Pcn [penicillins]        Past Medical History:   Diagnosis Date    Asthma     Cervical radiculopathy     Chronic pain syndrome     Depressive disorder     Fibromyalgia     GERD (gastroesophageal reflux disease)     Hyperlipidemia     Osteoarthritis     Palpitations     Radiculopathy of cervical region        Past Surgical History:   Procedure Laterality Date    CARPAL TUNNEL RELEASE Bilateral     HYSTERECTOMY         Family History   Problem Relation Age of Onset    Arthritis Mother     Cancer Mother     Depression Mother     Diabetes Mother     Heart disease Mother     Hypertension Mother     Stroke Mother     Asthma Daughter     Arthritis Maternal Grandmother     Cancer Maternal Grandmother     Depression Maternal Grandmother     Diabetes Maternal Grandmother     Hypertension Maternal Grandmother     Heart disease Maternal Grandfather     Hypertension Maternal Grandfather     Cancer Paternal Grandmother     Depression Paternal Grandmother     Diabetes Paternal Grandmother        Social History     Tobacco Use    Smoking status: Never Smoker    Smokeless tobacco: Never Used   Substance Use Topics    Alcohol use: Never    Drug use: Not Currently       Review of Systems   Constitutional: Negative for chills and fever.   HENT: Negative for hearing loss and trouble swallowing.    Eyes: Negative for visual disturbance.   Respiratory: Negative for cough, chest tightness and shortness of breath.    Cardiovascular: Negative for chest pain and leg  swelling.   Gastrointestinal: Negative for abdominal pain, diarrhea, nausea and vomiting.   Genitourinary: Negative for dysuria and hematuria.        Cyst on mons pubis that patient states is getting larger and bothersome.    Musculoskeletal: Negative for myalgias.   Integumentary:  Negative for rash.   Neurological: Negative for dizziness, weakness, light-headedness and headaches.   Psychiatric/Behavioral: Negative for suicidal ideas. The patient is not hyperactive.          Current Medications:   Medication List with Changes/Refills   New Medications    CLINDAMYCIN (CLEOCIN) 300 MG CAPSULE    Take 1 capsule (300 mg total) by mouth every 6 (six) hours. for 7 days       Start Date: 2/28/2022 End Date: 3/7/2022    NEOMYCIN-BACITRACIN-POLYMYXIN (TRIPLE ANTIBIOTIC) OINTMENT    Apply topically 2 (two) times daily.       Start Date: 2/28/2022 End Date: --   Current Medications    ACETAMINOPHEN-CODEINE 300-30MG (TYLENOL #3) 300-30 MG TAB    Take 1 tablet by mouth 3 (three) times daily as needed.       Start Date: --        End Date: --    ALBUTEROL (PROVENTIL/VENTOLIN HFA) 90 MCG/ACTUATION INHALER    Inhale 2 puffs into the lungs every 6 (six) hours as needed.       Start Date: --        End Date: --    DULOXETINE (CYMBALTA) 30 MG CAPSULE    Take 1 capsule (30 mg total) by mouth once daily.       Start Date: 7/8/2021  End Date: 8/7/2021    FAMOTIDINE (PEPCID) 40 MG TABLET    Take 40 mg by mouth.       Start Date: --        End Date: --    LACTULOSE (CHRONULAC) 20 GRAM/30 ML SOLN    Take 45 mLs (30 g total) by mouth 2 (two) times daily as needed (constipation).       Start Date: 10/28/2021End Date: --    MIRTAZAPINE (REMERON) 30 MG TABLET    Take 30 mg by mouth.       Start Date: --        End Date: --    OMEPRAZOLE (PRILOSEC) 40 MG CAPSULE    Take 40 mg by mouth.       Start Date: --        End Date: --    ONDANSETRON (ZOFRAN-ODT) 4 MG TBDL    Take 4 mg by mouth.       Start Date: --        End Date: --    PANTOPRAZOLE  "(PROTONIX) 40 MG TABLET    Take 40 mg by mouth once daily.       Start Date: --        End Date: --    PREGABALIN (LYRICA) 50 MG CAPSULE    Take 1 capsule (50 mg total) by mouth every 12 (twelve) hours.       Start Date: 7/8/2021  End Date: 8/7/2021            Objective:        Vitals:    02/22/22 0913   BP: 124/80   BP Location: Right arm   Patient Position: Sitting   Pulse: 84   Resp: 20   Temp: 97.2 °F (36.2 °C)   TempSrc: Temporal   SpO2: 97%   Weight: 73.5 kg (162 lb)   Height: 5' 6" (1.676 m)       Physical Exam  Vitals reviewed. Exam conducted with a chaperone present.   Constitutional:       General: She is not in acute distress.     Appearance: Normal appearance. She is not toxic-appearing.   HENT:      Head: Normocephalic and atraumatic.      Right Ear: External ear normal.      Left Ear: External ear normal.      Nose: Nose normal.      Mouth/Throat:      Mouth: Mucous membranes are moist.      Pharynx: Oropharynx is clear.   Eyes:      General: No scleral icterus.     Extraocular Movements: Extraocular movements intact.      Conjunctiva/sclera: Conjunctivae normal.      Pupils: Pupils are equal, round, and reactive to light.   Cardiovascular:      Rate and Rhythm: Normal rate and regular rhythm.      Pulses: Normal pulses.      Heart sounds: Normal heart sounds. No murmur heard.  Pulmonary:      Effort: Pulmonary effort is normal. No respiratory distress.      Breath sounds: Normal breath sounds. No wheezing, rhonchi or rales.   Abdominal:      General: Abdomen is flat. Bowel sounds are normal. There is no distension.      Palpations: Abdomen is soft.      Tenderness: There is no abdominal tenderness. There is no guarding or rebound.   Genitourinary:     General: Normal vulva.      Exam position: Lithotomy position.      Pubic Area: No rash.       Labia:         Right: No rash or tenderness.         Left: No rash or tenderness.       Vagina: No vaginal discharge.       Musculoskeletal:         General: " No swelling. Normal range of motion.      Cervical back: Normal range of motion and neck supple. No rigidity.   Skin:     General: Skin is warm and dry.      Capillary Refill: Capillary refill takes less than 2 seconds.      Findings: No rash.   Neurological:      General: No focal deficit present.      Mental Status: She is alert and oriented to person, place, and time. Mental status is at baseline.   Psychiatric:         Mood and Affect: Mood normal.         Behavior: Behavior normal.         Thought Content: Thought content normal.         Judgment: Judgment normal.               Lab Results   Component Value Date    WBC 4.66 01/25/2022    HGB 11.7 (L) 01/25/2022    HCT 40.4 01/25/2022     (H) 01/25/2022    ALT 28 01/25/2022    AST 16 01/25/2022     01/25/2022    K 4.0 01/25/2022     01/25/2022    CREATININE 0.81 01/25/2022    BUN 9 01/25/2022    CO2 31 01/25/2022      Assessment:       1. HSV (herpes simplex virus) infection    2. Epidermal inclusion cyst        Plan:         Problem List Items Addressed This Visit        Derm    Epidermal inclusion cyst     -pt reports that the cyst is growing and is irritating to her during her activities of daily living.   -pt is requesting removal of the cyst. Discussed patients case with Dr. Lemus who will see patient in clinic on Monday.               ID    HSV (herpes simplex virus) infection - Primary     -pt reports she is aware of her HSV positive status since a few years.  -denies any symptoms or lesions           Relevant Orders    HSV 1 & 2, IgM (Completed)            Follow up in about 1 week (around 3/1/2022), or if symptoms worsen or fail to improve.    Lenoreabril Vu DO     Instructed patient that if symptoms fail to improve or worsen patient should seek immediate medical attention or report to the nearest emergency department. Patient expressed verbal agreement and understanding to this plan of care.

## 2022-02-24 LAB — HSV IGM SER QL IA: POSITIVE

## 2022-02-28 ENCOUNTER — OFFICE VISIT (OUTPATIENT)
Dept: FAMILY MEDICINE | Facility: CLINIC | Age: 37
End: 2022-02-28
Payer: COMMERCIAL

## 2022-02-28 VITALS
HEIGHT: 66 IN | DIASTOLIC BLOOD PRESSURE: 85 MMHG | BODY MASS INDEX: 26.36 KG/M2 | TEMPERATURE: 98 F | RESPIRATION RATE: 18 BRPM | OXYGEN SATURATION: 96 % | WEIGHT: 164 LBS | HEART RATE: 72 BPM | SYSTOLIC BLOOD PRESSURE: 125 MMHG

## 2022-02-28 DIAGNOSIS — Z12.4 PAP SMEAR FOR CERVICAL CANCER SCREENING: ICD-10-CM

## 2022-02-28 DIAGNOSIS — L72.0 EPIDERMAL INCLUSION CYST: Primary | ICD-10-CM

## 2022-02-28 PROBLEM — Z98.890 H/O EXCISION OF EPIDERMAL INCLUSION CYST: Status: ACTIVE | Noted: 2022-02-28

## 2022-02-28 PROBLEM — Z87.2 H/O EXCISION OF EPIDERMAL INCLUSION CYST: Status: ACTIVE | Noted: 2022-02-28

## 2022-02-28 PROCEDURE — 11422 PR EXC SKIN BENIG 1.1-2 CM REMAINDR BODY: ICD-10-PCS | Mod: GC,,, | Performed by: SPECIALIST

## 2022-02-28 PROCEDURE — 11422 EXC H-F-NK-SP B9+MARG 1.1-2: CPT | Mod: GC,,, | Performed by: SPECIALIST

## 2022-02-28 PROCEDURE — 99214 OFFICE O/P EST MOD 30 MIN: CPT | Mod: 25,GC,, | Performed by: SPECIALIST

## 2022-02-28 PROCEDURE — 99214 PR OFFICE/OUTPT VISIT, EST, LEVL IV, 30-39 MIN: ICD-10-PCS | Mod: 25,GC,, | Performed by: SPECIALIST

## 2022-02-28 RX ORDER — CLINDAMYCIN HYDROCHLORIDE 300 MG/1
300 CAPSULE ORAL EVERY 6 HOURS
Qty: 28 CAPSULE | Refills: 0 | Status: SHIPPED | OUTPATIENT
Start: 2022-02-28 | End: 2022-03-07

## 2022-02-28 RX ORDER — PSEUDOEPH/DM/GUAIFEN/ACETAMIN 30-10-324
EXPECTORANT ORAL 2 TIMES DAILY
Qty: 28 G | Refills: 0 | Status: SHIPPED | OUTPATIENT
Start: 2022-02-28 | End: 2023-11-03

## 2022-02-28 RX ORDER — LIDOCAINE HYDROCHLORIDE AND EPINEPHRINE 10; 10 MG/ML; UG/ML
1 INJECTION, SOLUTION INFILTRATION; PERINEURAL
Status: SHIPPED | OUTPATIENT
Start: 2022-02-28

## 2022-02-28 NOTE — PROGRESS NOTES
Excisional Biopsy Procedure Note    Pre-operative Diagnosis: Epidermal cyst    Post-operative Diagnosis: same    Locations: Mons Pubis    Indications: Painful epidermoid cyst    Anesthesia: Lidocaine 1% with epinephrine without added sodium bicarbonate    Procedure Details   The risks, benefits, indications, potential complications, and alternatives were explained to the patient and informed consent obtained.    The lesion and surrounding area was given a sterile prep using betadyne and draped in the usual sterile fashion. A scalpel was used to excise an elliptical area of skin approximately 1cm by 2cm. The wound was closed with 4-0 Nylon using simple interrupted stitches. Antibiotic ointment and a sterile dressing applied. The specimen was sent for pathologic examination. The patient tolerated the procedure well.    EBL: minimal    Findings: firm 1x1 cm tissue appearance similar to subcutaneous fat    Condition: Stable    Complications:  None    Plan:  1. Instructed to keep the wound dry and covered for 24-48 hours and clean thereafter.  2. Warning signs of infection were reviewed.    3. Recommended that the patient use OTC ibuprofen as needed for pain.   4. Return for suture removal in 10 days.

## 2022-02-28 NOTE — PROGRESS NOTES
Patient was seen by Dr Vu last week for cyst on mons pubis. Patient states lesion has rapidly grown in size and is painful to touch. Patient would like it excised today. Appearance is consistent with epidermal inclusion cyst.    Problem List Items Addressed This Visit        Derm    Epidermal inclusion cyst - Primary    Relevant Medications    clindamycin (CLEOCIN) 300 MG capsule    neomycin-bacitracin-polymyxin (TRIPLE ANTIBIOTIC) ointment    Other Relevant Orders    Surgical Pathology

## 2022-03-01 PROBLEM — B00.9 HSV (HERPES SIMPLEX VIRUS) INFECTION: Status: ACTIVE | Noted: 2022-03-01

## 2022-03-01 PROCEDURE — 88305 TISSUE EXAM BY PATHOLOGIST: CPT | Mod: SUR | Performed by: FAMILY MEDICINE

## 2022-03-01 PROCEDURE — 88305 TISSUE EXAM BY PATHOLOGIST: CPT | Mod: 26,,, | Performed by: PATHOLOGY

## 2022-03-01 PROCEDURE — 87624 HPV HI-RISK TYP POOLED RSLT: CPT | Mod: ,,, | Performed by: CLINICAL MEDICAL LABORATORY

## 2022-03-01 PROCEDURE — 87624 HUMAN PAPILLOMAVIRUS (HPV): ICD-10-PCS | Mod: ,,, | Performed by: CLINICAL MEDICAL LABORATORY

## 2022-03-01 PROCEDURE — 88142 CYTOPATH C/V THIN LAYER: CPT | Mod: GCY | Performed by: FAMILY MEDICINE

## 2022-03-01 PROCEDURE — 88305 SURGICAL PATHOLOGY: ICD-10-PCS | Mod: 26,,, | Performed by: PATHOLOGY

## 2022-03-01 NOTE — ASSESSMENT & PLAN NOTE
-pt reports that the cyst is growing and is irritating to her during her activities of daily living.   -pt is requesting removal of the cyst. Discussed patients case with Dr. Lemus who will see patient in clinic on Monday.

## 2022-03-01 NOTE — PROGRESS NOTES
"Subjective:       Keshia Shepherd is a 37 y.o. woman who comes in today for a  pap smear. She has not had a recent pap smear.    The following portions of the patient's history were reviewed and updated as appropriate: allergies, current medications, past family history, past medical history, past social history, past surgical history and problem list.    Review of Systems  Pertinent items are noted in HPI.     Objective:      /85 (BP Location: Right arm, Patient Position: Sitting)   Pulse 72   Temp 97.9 °F (36.6 °C) (Temporal)   Resp 18   Ht 5' 6" (1.676 m)   Wt 74.4 kg (164 lb)   SpO2 96%   BMI 26.47 kg/m²   Pelvic Exam: adnexae not palpable, cervix normal in appearance, external genitalia normal and vagina normal without discharge. Pap smear obtained.     Assessment:      Screening pap smear.     Plan:      Follow up in 1 year, or as indicated by Pap results.     "

## 2022-03-01 NOTE — ASSESSMENT & PLAN NOTE
-pt reports she is aware of her HSV positive status since a few years.  -denies any symptoms or lesions

## 2022-03-02 LAB
GH SERPL-MCNC: NORMAL NG/ML
INSULIN SERPL-ACNC: NORMAL U[IU]/ML
LAB AP CLINICAL INFORMATION: NORMAL
LAB AP GYN INTERPRETATION: NEGATIVE
LAB AP PAP DISCLAIMER COMMENTS: NORMAL
RENIN PLAS-CCNC: NORMAL NG/ML/H

## 2022-03-03 LAB
DHEA SERPL-MCNC: NORMAL
ESTROGEN SERPL-MCNC: NORMAL PG/ML
INSULIN SERPL-ACNC: NORMAL U[IU]/ML
LAB AP CLINICAL INFORMATION: NORMAL
LAB AP GROSS DESCRIPTION: NORMAL
LAB AP LABORATORY NOTES: NORMAL
T3RU NFR SERPL: NORMAL %

## 2022-03-04 LAB
HPV 16: NEGATIVE
HPV 18: POSITIVE
HPV OTHER: POSITIVE

## 2022-03-07 ENCOUNTER — OFFICE VISIT (OUTPATIENT)
Dept: FAMILY MEDICINE | Facility: CLINIC | Age: 37
End: 2022-03-07
Payer: COMMERCIAL

## 2022-03-07 VITALS
WEIGHT: 161 LBS | TEMPERATURE: 97 F | OXYGEN SATURATION: 97 % | HEART RATE: 64 BPM | DIASTOLIC BLOOD PRESSURE: 85 MMHG | SYSTOLIC BLOOD PRESSURE: 130 MMHG | BODY MASS INDEX: 25.99 KG/M2

## 2022-03-07 DIAGNOSIS — B97.7 HPV (HUMAN PAPILLOMA VIRUS) INFECTION: ICD-10-CM

## 2022-03-07 DIAGNOSIS — L91.0 KELOID OF SKIN: Primary | ICD-10-CM

## 2022-03-07 PROCEDURE — 99213 OFFICE O/P EST LOW 20 MIN: CPT | Mod: GC,,, | Performed by: FAMILY MEDICINE

## 2022-03-07 PROCEDURE — 99213 PR OFFICE/OUTPT VISIT, EST, LEVL III, 20-29 MIN: ICD-10-PCS | Mod: GC,,, | Performed by: FAMILY MEDICINE

## 2022-03-07 NOTE — PROGRESS NOTES
Subjective:      Keshia Shepherd is a 37 y.o. female who returns for stitch removal after surgical excision was performed 8 days ago, closure was performed with 5 suture. . She denies pain, redness, or drainage from the wound.   The following portions of the patient's history were reviewed and updated as appropriate: allergies, current medications, past family history, past medical history, past social history, past surgical history and problem list.    Review of Systems  Pertinent items are noted in HPI.      Objective:      /85   Pulse 64   Temp 96.9 °F (36.1 °C) (Temporal)   Wt 73 kg (161 lb)   SpO2 97%   BMI 25.99 kg/m²   Injury exam:  A 3 cm laceration noted on the mons pubis is healing well, without evidence of infection.      Assessment:      Laceration is healing well, without evidence of infection.      Plan:        1. 5 sutures were removed.  2. Wound care discussed.  3. Follow up as needed.     Problem List Items Addressed This Visit        Derm    Keloid of skin - Primary     Pathology results indicate lesion removed is an immature Keloid.              Renal/    HPV (human papilloma virus) infection     Patient is noted to have active HPV 18 infection based on pap smear results. She has undergone partial Hysterectomy 10 years ago.   Although patient can safely continue follow up with yearly HPV testing she will benefit with Gyn evaluation for any further testing that may be warranted at this time.           Relevant Orders    Ambulatory referral/consult to Obstetrics / Gynecology

## 2022-03-07 NOTE — ASSESSMENT & PLAN NOTE
Patient is noted to have active HPV 18 infection based on pap smear results. She has undergone partial Hysterectomy 10 years ago.   Although patient can safely continue follow up with yearly HPV testing she will benefit with Gyn evaluation for any further testing that may be warranted at this time.

## 2022-04-20 ENCOUNTER — OFFICE VISIT (OUTPATIENT)
Dept: OBSTETRICS AND GYNECOLOGY | Facility: CLINIC | Age: 37
End: 2022-04-20
Payer: COMMERCIAL

## 2022-04-20 VITALS
RESPIRATION RATE: 16 BRPM | DIASTOLIC BLOOD PRESSURE: 70 MMHG | WEIGHT: 158.81 LBS | HEIGHT: 66 IN | SYSTOLIC BLOOD PRESSURE: 120 MMHG | BODY MASS INDEX: 25.52 KG/M2

## 2022-04-20 DIAGNOSIS — B97.7 HPV (HUMAN PAPILLOMA VIRUS) INFECTION: ICD-10-CM

## 2022-04-20 DIAGNOSIS — R87.811 VAGINAL HIGH RISK HPV DNA TEST POSITIVE: ICD-10-CM

## 2022-04-20 PROCEDURE — 99214 OFFICE O/P EST MOD 30 MIN: CPT | Mod: PBBFAC | Performed by: STUDENT IN AN ORGANIZED HEALTH CARE EDUCATION/TRAINING PROGRAM

## 2022-04-20 PROCEDURE — 99203 OFFICE O/P NEW LOW 30 MIN: CPT | Mod: S$PBB,,, | Performed by: STUDENT IN AN ORGANIZED HEALTH CARE EDUCATION/TRAINING PROGRAM

## 2022-04-20 PROCEDURE — 99203 PR OFFICE/OUTPT VISIT, NEW, LEVL III, 30-44 MIN: ICD-10-PCS | Mod: S$PBB,,, | Performed by: STUDENT IN AN ORGANIZED HEALTH CARE EDUCATION/TRAINING PROGRAM

## 2022-04-21 NOTE — PROGRESS NOTES
Gynecology History and Physical    Assessment/Plan:   Problem List Items Addressed This Visit        Renal/    HPV (human papilloma virus) infection    Vaginal high risk HPV DNA test positive    Overview     S/p RATL BSO cysto with Dr. Lindsay in 2011  Reports abnormal Pap smears before hyst  Pap NILM, HPV 18 and other HR HPV positive  Return in 2-3 weeks for colpo                   CC:   Chief Complaint   Patient presents with    HPV 18 on previous pap      Pt unsure when she had this      HPI: Keshia Shepherd is a 37 y.o. female who presents with complaints of abnormal pap smears.      Review of Systems: The following ROS was otherwise negative, except as noted in the HPI:  constitutional, HEENT, respiratory, cardiovascular, gastrointestinal, genitourinary, skin, musculoskeletal, neurological, psych    Gynecologic History:   history of abnormal pap smears  history of of STIs  Menstrual history:       Obstetrical History:  OB History    No obstetric history on file.         Past Medical History:   Past Medical History:   Diagnosis Date    Asthma     Cervical radiculopathy     Chronic pain syndrome     Depressive disorder     Fibromyalgia     GERD (gastroesophageal reflux disease)     Hyperlipidemia     Osteoarthritis     Palpitations     Radiculopathy of cervical region        Medications:  Medication List with Changes/Refills   Current Medications    ACETAMINOPHEN-CODEINE 300-30MG (TYLENOL #3) 300-30 MG TAB    Take 1 tablet by mouth 3 (three) times daily as needed.    ALBUTEROL (PROVENTIL/VENTOLIN HFA) 90 MCG/ACTUATION INHALER    Inhale 2 puffs into the lungs every 6 (six) hours as needed.    DULOXETINE (CYMBALTA) 30 MG CAPSULE    Take 1 capsule (30 mg total) by mouth once daily.    FAMOTIDINE (PEPCID) 40 MG TABLET    Take 40 mg by mouth.    LACTULOSE (CHRONULAC) 20 GRAM/30 ML SOLN    Take 45 mLs (30 g total) by mouth 2 (two) times daily as needed (constipation).    MIRTAZAPINE (REMERON) 30 MG TABLET    " Take 30 mg by mouth.    NEOMYCIN-BACITRACIN-POLYMYXIN (TRIPLE ANTIBIOTIC) OINTMENT    Apply topically 2 (two) times daily.    OMEPRAZOLE (PRILOSEC) 40 MG CAPSULE    Take 40 mg by mouth.    ONDANSETRON (ZOFRAN-ODT) 4 MG TBDL    Take 4 mg by mouth.    PANTOPRAZOLE (PROTONIX) 40 MG TABLET    Take 40 mg by mouth once daily.    PREGABALIN (LYRICA) 50 MG CAPSULE    Take 1 capsule (50 mg total) by mouth every 12 (twelve) hours.       Allergies:  Opioids - morphine analogues and Pcn [penicillins]    Surgical History:  Past Surgical History:   Procedure Laterality Date    CARPAL TUNNEL RELEASE Bilateral     HYSTERECTOMY         Family History:  Family History   Problem Relation Age of Onset    Arthritis Mother     Cancer Mother     Depression Mother     Diabetes Mother     Heart disease Mother     Hypertension Mother     Stroke Mother     Asthma Daughter     Arthritis Maternal Grandmother     Cancer Maternal Grandmother     Depression Maternal Grandmother     Diabetes Maternal Grandmother     Hypertension Maternal Grandmother     Heart disease Maternal Grandfather     Hypertension Maternal Grandfather     Cancer Paternal Grandmother     Depression Paternal Grandmother     Diabetes Paternal Grandmother        Social History:  Social History     Substance and Sexual Activity   Alcohol Use Never     Social History     Substance and Sexual Activity   Drug Use Not Currently     Social History     Tobacco Use   Smoking Status Never Smoker   Smokeless Tobacco Never Used       Physical Exam:  /70 (BP Location: Left arm, Patient Position: Sitting)   Resp 16   Ht 5' 6" (1.676 m)   Wt 72 kg (158 lb 12.8 oz)   BMI 25.63 kg/m²     General: Alert, well appearing, no acute distress  Head: Normocephalic, atraumatic  Lungs: Unlabored respirations  Abdomen: Soft, nontender, nondistended   Pelvic: deferred  Extremities: No redness or tenderness  Skin: Well perfused, normal coloration and turgor, no lesions or " rashes visualized  Neuro: Alert, oriented, normal speech, no focal deficits, moves extremities appropriately  Osteopathic: No TART changes    Labs:  No visits with results within 1 Day(s) from this visit.   Latest known visit with results is:   Office Visit on 02/28/2022   Component Date Value    Case Report 03/01/2022                      Value:Surgical Pathology                                Case: [id number]                                   Authorizing Provider:  Erika Torres MD            Collected:           03/01/2022 08:21 AM          Ordering Location:     [facility]      Received:            03/01/2022 08:21 AM                                 [facility]                                                     Pathologist:           Amadou Saleh MD                                                       Specimen:    Perineum                                                                                   Final Diagnosis 03/01/2022                      Value:This result contains rich text formatting which cannot be displayed here.    Comments 03/01/2022                      Value:This result contains rich text formatting which cannot be displayed here.    Gross Description 03/01/2022                      Value:This result contains rich text formatting which cannot be displayed here.    Microscopic Description 03/01/2022                      Value:This result contains rich text formatting which cannot be displayed here.    Clinical Information 03/01/2022                      Value:This result contains rich text formatting which cannot be displayed here.    Laboratory Notes 03/01/2022                      Value:This result contains rich text formatting which cannot be displayed here.    Case Report 03/01/2022                      Value:Pap Cytology                                      Case: [id number]                                   Authorizing Provider:  Erika Torres MD             Collected:           03/01/2022 08:55 AM          Ordering Location:     [facility]      Received:            03/02/2022 09:38 AM                                 [facility]                                                     First Screen:          VICKY Rodríguez(ASCP)                                                    Specimen:    Liquid-Based Pap Test, Screening, Vagina                                                   Interpretation 03/01/2022 NEGATIVE     General Categorization 03/01/2022 Negative for intraepithelial lesion or malignancy     Specimen Adequacy 03/01/2022 Satisfactory for evaluation     Clinical Information 03/01/2022                      Value:This result contains rich text formatting which cannot be displayed here.    Disclaimer 03/01/2022                      Value:This result contains rich text formatting which cannot be displayed here.    HPV 16 03/01/2022 Negative     HPV 18 03/01/2022 Positive (A)    HPV Other 03/01/2022 Positive (A)

## 2022-11-29 RX ORDER — DULOXETIN HYDROCHLORIDE 30 MG/1
30 CAPSULE, DELAYED RELEASE ORAL DAILY
Qty: 30 CAPSULE | Refills: 0 | Status: CANCELLED | OUTPATIENT
Start: 2022-11-29 | End: 2022-12-29

## 2022-11-29 RX ORDER — PREGABALIN 50 MG/1
50 CAPSULE ORAL EVERY 12 HOURS
Qty: 60 CAPSULE | Refills: 0 | Status: CANCELLED | OUTPATIENT
Start: 2022-11-29 | End: 2022-12-29

## 2022-11-29 NOTE — PROGRESS NOTES
Subjective:         Patient ID: Keshia Shepherd is a 37 y.o. female.    Chief Complaint: Low-back Pain, Knee Pain, and Neck Pain      Pain  This is a chronic problem. The current episode started more than 1 year ago. The problem occurs daily. The problem has been waxing and waning. Associated symptoms include arthralgias, myalgias and neck pain. Pertinent negatives include no change in bowel habit, chest pain, chills, coughing, diaphoresis, fatigue, fever, rash, sore throat, vertigo or vomiting.   Review of Systems   Constitutional:  Negative for activity change, appetite change, chills, diaphoresis, fatigue, fever and unexpected weight change.   HENT:  Negative for drooling, ear discharge, ear pain, facial swelling, nosebleeds, sore throat, trouble swallowing, voice change and goiter.    Eyes:  Negative for photophobia, pain, discharge, redness and visual disturbance.   Respiratory:  Negative for apnea, cough, choking, chest tightness, shortness of breath, wheezing and stridor.    Cardiovascular:  Negative for chest pain, palpitations and leg swelling.   Gastrointestinal:  Negative for abdominal distention, change in bowel habit, diarrhea, rectal pain, vomiting, fecal incontinence and change in bowel habit.   Endocrine: Negative for cold intolerance, heat intolerance, polydipsia, polyphagia and polyuria.   Genitourinary:  Negative for bladder incontinence, dysuria, flank pain, frequency, hematuria and hot flashes.   Musculoskeletal:  Positive for arthralgias, back pain, leg pain, myalgias, neck pain and neck stiffness. Negative for gait problem and joint deformity.   Integumentary:  Negative for color change, pallor and rash.   Allergic/Immunologic: Negative for immunocompromised state.   Neurological:  Negative for dizziness, vertigo, seizures, syncope, facial asymmetry, speech difficulty, light-headedness, coordination difficulties, memory loss and coordination difficulties.   Hematological:  Negative for  "adenopathy. Does not bruise/bleed easily.   Psychiatric/Behavioral:  Negative for agitation, behavioral problems, confusion, decreased concentration, dysphoric mood, hallucinations, self-injury and suicidal ideas. The patient is not nervous/anxious and is not hyperactive.          Past Medical History:   Diagnosis Date    Asthma     Cervical radiculopathy     Chronic pain syndrome     Depressive disorder     Fibromyalgia     GERD (gastroesophageal reflux disease)     Hyperlipidemia     Osteoarthritis     Palpitations     Radiculopathy of cervical region      Past Surgical History:   Procedure Laterality Date    CARPAL TUNNEL RELEASE Bilateral     HYSTERECTOMY       Social History     Socioeconomic History    Marital status: Single   Tobacco Use    Smoking status: Never    Smokeless tobacco: Never   Substance and Sexual Activity    Alcohol use: Never    Drug use: Not Currently     Family History   Problem Relation Age of Onset    Arthritis Mother     Cancer Mother     Depression Mother     Diabetes Mother     Heart disease Mother     Hypertension Mother     Stroke Mother     Asthma Daughter     Arthritis Maternal Grandmother     Cancer Maternal Grandmother     Depression Maternal Grandmother     Diabetes Maternal Grandmother     Hypertension Maternal Grandmother     Heart disease Maternal Grandfather     Hypertension Maternal Grandfather     Cancer Paternal Grandmother     Depression Paternal Grandmother     Diabetes Paternal Grandmother      Review of patient's allergies indicates:   Allergen Reactions    Opioids - morphine analogues     Pcn [penicillins]         Objective:  Vitals:    11/30/22 0846   BP: 121/75   Pulse: 68   Resp: 18   Weight: 68.9 kg (152 lb)   Height: 5' 6" (1.676 m)   PainSc: 10-Worst pain ever         Physical Exam  Vitals and nursing note reviewed. Exam conducted with a chaperone present.   Constitutional:       General: She is awake. She is not in acute " distress.     Appearance: Normal appearance. She is not ill-appearing, toxic-appearing or diaphoretic.   HENT:      Head: Normocephalic and atraumatic.      Nose: Nose normal.      Mouth/Throat:      Mouth: Mucous membranes are moist.      Pharynx: Oropharynx is clear.   Eyes:      Conjunctiva/sclera: Conjunctivae normal.      Pupils: Pupils are equal, round, and reactive to light.   Cardiovascular:      Rate and Rhythm: Normal rate.   Pulmonary:      Effort: Pulmonary effort is normal. No respiratory distress.   Abdominal:      Palpations: Abdomen is soft.   Musculoskeletal:         General: No deformity or signs of injury. Normal range of motion.      Cervical back: Normal range of motion and neck supple.   Skin:     General: Skin is warm and dry.   Neurological:      General: No focal deficit present.      Mental Status: She is alert and oriented to person, place, and time. Mental status is at baseline.      Cranial Nerves: No cranial nerve deficit (II-XII).   Psychiatric:         Mood and Affect: Mood normal.         Behavior: Behavior normal. Behavior is cooperative.         Thought Content: Thought content normal.         X-Ray Abdomen AP 1 View  Narrative: EXAMINATION:  XR ABDOMEN AP 1 VIEW    CLINICAL HISTORY:  Lower abdominal pain, unspecified    COMPARISON:  11/02/2017    FINDINGS:  There is abundant stool but no suggestion of an obstruction.  No calcifications are seen overlying the kidneys.  Nonspecific bilateral pelvic calcifications are noted.  Bony structures are normal.  Lung bases are clear.  Impression: Abundant stool but otherwise unremarkable study.    Place of service: [facility]    Electronically signed by: Cinthia Lamb  Date:    10/28/2021  Time:    16:22       No visits with results within 6 Month(s) from this visit.   Latest known visit with results is:   Office Visit on 02/28/2022   Component Date Value Ref Range Status    Case Report 03/01/2022    Final                     Value:Surgical Pathology                                Case: [id number]                                   Authorizing Provider:  Erika Torres MD            Collected:           03/01/2022 08:21 AM          Ordering Location:     [facility]      Received:            03/01/2022 08:21 AM                                 [facility]                                                     Pathologist:           Amadou Saleh MD                                                       Specimen:    Perineum                                                                                   Final Diagnosis 03/01/2022    Final                    Value:This result contains rich text formatting which cannot be displayed here.    Comments 03/01/2022    Final                    Value:This result contains rich text formatting which cannot be displayed here.    Gross Description 03/01/2022    Final                    Value:This result contains rich text formatting which cannot be displayed here.    Microscopic Description 03/01/2022    Final                    Value:This result contains rich text formatting which cannot be displayed here.    Clinical Information 03/01/2022    Final                    Value:This result contains rich text formatting which cannot be displayed here.    Laboratory Notes 03/01/2022    Final                    Value:This result contains rich text formatting which cannot be displayed here.    Case Report 03/01/2022    Final                    Value:Pap Cytology                                      Case: [id number]                                   Authorizing Provider:  Erika Torres MD            Collected:           03/01/2022 08:55 AM          Ordering Location:     [facility]      Received:            03/02/2022 09:38 AM                                 [facility]                                                     First Screen:          Antonietta  VICKY Richey(ASCP)                                                    Specimen:    Liquid-Based Pap Test, Screening, Vagina                                                   Interpretation 03/01/2022 NEGATIVE              Final    General Categorization 03/01/2022 Negative for intraepithelial lesion or malignancy   Final    Specimen Adequacy 03/01/2022 Satisfactory for evaluation   Final    Clinical Information 03/01/2022    Final                    Value:This result contains rich text formatting which cannot be displayed here.    Disclaimer 03/01/2022    Final                    Value:This result contains rich text formatting which cannot be displayed here.    HPV 16 03/01/2022 Negative  Negative, Invalid Final    HPV 18 03/01/2022 Positive (A)  Negative, Invalid Final    HPV Other 03/01/2022 Positive (A)  Negative, Invalid Final         Orders Placed This Encounter   Procedures    X-Ray Knee 3 View Bilateral     Standing Status:   Future     Standing Expiration Date:   11/30/2023     Order Specific Question:   Does the patient have a splint or a brace?     Answer:   No     Order Specific Question:   Does the patient have a cast?     Answer:   No     Order Specific Question:   May the Radiologist modify the order per protocol to meet the clinical needs of the patient?     Answer:   Yes     Order Specific Question:   Release to patient     Answer:   Immediate     Order Specific Question:   Is the patient pregnant?     Answer:   No    X-Ray Hips Bilateral 2 View Inc AP Pelvis     Standing Status:   Future     Standing Expiration Date:   11/30/2023     Order Specific Question:   Does the patient have a splint or a brace?     Answer:   No     Order Specific Question:   Does the patient have a cast?     Answer:   No     Order Specific Question:   Is the patient pregnant?     Answer:   No     Order Specific Question:   May the Radiologist modify the order per protocol to meet the clinical needs of the patient?      Answer:   Yes     Order Specific Question:   Release to patient     Answer:   Immediate    X-Ray Lumbar Spine 2 Or 3 Views     Standing Status:   Future     Standing Expiration Date:   2/28/2023     Order Specific Question:   Is the patient pregnant?     Answer:   No     Order Specific Question:   May the Radiologist modify the order per protocol to meet the clinical needs of the patient?     Answer:   Yes     Order Specific Question:   Does the patient have a neck collar or brace on?     Answer:   No    MRI Lumbar Spine Without Contrast     Standing Status:   Future     Standing Expiration Date:   11/30/2023     Order Specific Question:   Does the patient have a pacemaker or a defibrilator (Note: Some facilities may not be able to schedule an MRI for patients with pacemakers and defibrillators. You should contact your local radiology department to determine if this is the case.)?     Answer:   No     Order Specific Question:   Does the patient have an aneurysm or surgical clip, pump, nerve/brain stimulator, middle/inner ear prosthesis, or other metal implant or foreign object (bullet, shrapnel)? If they have a card related to their implant, ask them to bring it. Issues related to the implant may cause the MRI to be delayed.     Answer:   No     Order Specific Question:   Is the patient claustrophobic?     Answer:   No     Order Specific Question:   Will the patient require sedation?     Answer:   No     Order Specific Question:   May the Radiologist modify the order per protocol to meet the clinical needs of the patient?     Answer:   Yes     Order Specific Question:   Is this part of a Research Study?     Answer:   No     Order Specific Question:   Recist criteria?     Answer:   No     Order Specific Question:   Will this service be billed to a Worker's Comp policy?     Answer:   No     Order Specific Question:   Does the patient have on a skin patch for medication with aluminized backing?     Answer:   No      Order Specific Question:   Does the patient have any of the following conditions? Diabetes, History of Renal Disease or Hypertension requiring medical therapy?     Answer:   No     Order Specific Question:   Is the patient pregnant?     Answer:   No    YOHAN EIA w/ Reflex to dsDNA/CLINT     Standing Status:   Future     Standing Expiration Date:   2/28/2023    C3 Complement     Standing Status:   Future     Standing Expiration Date:   1/30/2023    C4 Complement     Standing Status:   Future     Standing Expiration Date:   1/30/2023    Complement, Total     Standing Status:   Future     Standing Expiration Date:   1/30/2023    Cyclic Citrullinated Peptide Antibody, IgG     Standing Status:   Future     Standing Expiration Date:   1/29/2024    CBC Auto Differential     Standing Status:   Future     Standing Expiration Date:   2/28/2023    Comprehensive Metabolic Panel     Standing Status:   Future     Standing Expiration Date:   2/28/2023    Rheumatoid Quantitative     Standing Status:   Future     Standing Expiration Date:   2/28/2023    HLA-B27 antigen     Standing Status:   Future     Standing Expiration Date:   2/28/2023    C-Reactive Protein     Standing Status:   Future     Standing Expiration Date:   2/28/2023    Sedimentation Rate     Standing Status:   Future     Standing Expiration Date:   2/28/2023    Treponema Pallidum (Syphillis) Antibody     Standing Status:   Future     Standing Expiration Date:   2/28/2023    T4, Free     Standing Status:   Future     Standing Expiration Date:   2/28/2023    TSH     Standing Status:   Future     Standing Expiration Date:   2/28/2023    Lupus Anticoagulant Eval w/Reflex     Standing Status:   Future     Standing Expiration Date:   2/28/2023    Vitamin D     Standing Status:   Future     Standing Expiration Date:   2/28/2023    Hepatitis C Antibody     Standing Status:   Future     Standing Expiration Date:   2/28/2023     Order Specific Question:    Release to patient     Answer:   Immediate    Uric Acid     Standing Status:   Future     Standing Expiration Date:   2/28/2023    Antistreptolysin O (ASO)     Standing Status:   Future     Standing Expiration Date:   2/28/2023    Anti-DNA Ab, Double-Stranded     Standing Status:   Future     Standing Expiration Date:   2/28/2023    West Nile Antibodies, IgG and IgM     Standing Status:   Future     Standing Expiration Date:   2/28/2023    Ehrlichia Antibody Panel     Standing Status:   Future     Standing Expiration Date:   2/28/2023    Spotted Fever Group Antibodies     Standing Status:   Future     Standing Expiration Date:   2/28/2023    Lyme Antibody w/ Immunoblot Reflex     Standing Status:   Future     Standing Expiration Date:   2/28/2023    CK     Standing Status:   Future     Standing Expiration Date:   2/28/2023    Quantiferon Gold TB     Standing Status:   Future     Standing Expiration Date:   1/29/2024         Requested Prescriptions      No prescriptions requested or ordered in this encounter       Assessment:     1. Myalgia    2. Chronic pain of both knees    3. Vitamin D deficiency    4. Polyarthralgia    5. Lumbosacral radiculopathy    6. Dorsalgia, unspecified    7. Lumbar radiculopathy, chronic         A's of Opioid Risk Assessment  Activity:Patient can perform ADL.   Analgesia:Patients pain is partially controlled by current medication. Patient has tried OTC medications such as Tylenol and Ibuprofen with out relief.   Adverse Effects: Patient denies constipation or sedation.  Aberrant Behavior:  reviewed with no aberrant drug seeking/taking behavior.  Overdose reversal drug naloxone discussed    Drug screen reviewed      Plan:    Last seen July 2021    Her insurance had lapsed     Currently not using any nonnarcotic are narcotic medication    Complaint back pain buttock and leg pain bilateral knee pain her buttock and leg pains radicular nature pain numbness and tingling worse with  standing walking laughing or coughing this has been ongoing for more than a year     Her knee pain sacrum discomfort has been ongoing for more than a year    Continues complain multiple joint pain muscle pain ongoing for more than a year    After discussing options risks benefits she like to proceed with  X-ray lumbar spine, pelvis and hips, bilateral knees     MRI lumbar spine lumbosacral radiculopathy     MRI for consideration procedure/surgery    I have given the patient medically directed home exercise program    Patient has tried NSAIDs and neuromodulators (neurontin, lyrica, elavil, or cymbalta)    Labs inflammatory arthropathies tick bite panels vitamin-D deficiency    She would like to follow-up 1 month discuss options with Dr. Walters    Bring original prescription medication bottles/container/box with labels to each visit    Pill count    Physical therapy    Massage therapy declines

## 2022-11-30 ENCOUNTER — HOSPITAL ENCOUNTER (OUTPATIENT)
Dept: RADIOLOGY | Facility: HOSPITAL | Age: 37
Discharge: HOME | End: 2022-11-30
Attending: PHYSICIAN ASSISTANT
Payer: COMMERCIAL

## 2022-11-30 ENCOUNTER — OFFICE VISIT (OUTPATIENT)
Dept: PAIN MEDICINE | Facility: CLINIC | Age: 37
End: 2022-11-30

## 2022-11-30 VITALS
HEART RATE: 68 BPM | WEIGHT: 152 LBS | SYSTOLIC BLOOD PRESSURE: 121 MMHG | RESPIRATION RATE: 18 BRPM | DIASTOLIC BLOOD PRESSURE: 75 MMHG | BODY MASS INDEX: 24.43 KG/M2 | HEIGHT: 66 IN

## 2022-11-30 DIAGNOSIS — G89.29 CHRONIC PAIN OF BOTH KNEES: Chronic | ICD-10-CM

## 2022-11-30 DIAGNOSIS — M54.17 LUMBOSACRAL RADICULOPATHY: ICD-10-CM

## 2022-11-30 DIAGNOSIS — M54.9 DORSALGIA, UNSPECIFIED: ICD-10-CM

## 2022-11-30 DIAGNOSIS — M25.561 CHRONIC PAIN OF BOTH KNEES: ICD-10-CM

## 2022-11-30 DIAGNOSIS — M79.10 MYALGIA: Primary | Chronic | ICD-10-CM

## 2022-11-30 DIAGNOSIS — M54.17 LUMBOSACRAL RADICULOPATHY: Chronic | ICD-10-CM

## 2022-11-30 DIAGNOSIS — M25.50 POLYARTHRALGIA: Chronic | ICD-10-CM

## 2022-11-30 DIAGNOSIS — M54.16 LUMBAR RADICULOPATHY, CHRONIC: ICD-10-CM

## 2022-11-30 DIAGNOSIS — M25.562 CHRONIC PAIN OF BOTH KNEES: Chronic | ICD-10-CM

## 2022-11-30 DIAGNOSIS — M25.562 CHRONIC PAIN OF BOTH KNEES: ICD-10-CM

## 2022-11-30 DIAGNOSIS — G89.29 CHRONIC PAIN OF BOTH KNEES: ICD-10-CM

## 2022-11-30 DIAGNOSIS — E55.9 VITAMIN D DEFICIENCY: Chronic | ICD-10-CM

## 2022-11-30 DIAGNOSIS — M25.561 CHRONIC PAIN OF BOTH KNEES: Chronic | ICD-10-CM

## 2022-11-30 PROCEDURE — 73560 X-RAY EXAM OF KNEE 1 OR 2: CPT | Mod: 26,50,, | Performed by: RADIOLOGY

## 2022-11-30 PROCEDURE — 73521 XR HIPS BILATERAL 2 VIEW INCL AP PELVIS: ICD-10-PCS | Mod: 26,,, | Performed by: RADIOLOGY

## 2022-11-30 PROCEDURE — 73560 X-RAY EXAM OF KNEE 1 OR 2: CPT | Mod: TC,50

## 2022-11-30 PROCEDURE — 99214 PR OFFICE/OUTPT VISIT, EST, LEVL IV, 30-39 MIN: ICD-10-PCS | Mod: S$PBB,,, | Performed by: PHYSICIAN ASSISTANT

## 2022-11-30 PROCEDURE — 72100 XR LUMBAR SPINE 2 OR 3 VIEWS: ICD-10-PCS | Mod: 26,,, | Performed by: RADIOLOGY

## 2022-11-30 PROCEDURE — 73560 XR KNEE 1 OR 2 VIEW BILATERAL: ICD-10-PCS | Mod: 26,50,, | Performed by: RADIOLOGY

## 2022-11-30 PROCEDURE — 72100 X-RAY EXAM L-S SPINE 2/3 VWS: CPT | Mod: 26,,, | Performed by: RADIOLOGY

## 2022-11-30 PROCEDURE — 72100 X-RAY EXAM L-S SPINE 2/3 VWS: CPT | Mod: TC

## 2022-11-30 PROCEDURE — 73521 X-RAY EXAM HIPS BI 2 VIEWS: CPT | Mod: TC

## 2022-11-30 PROCEDURE — 99215 OFFICE O/P EST HI 40 MIN: CPT | Mod: PBBFAC | Performed by: PHYSICIAN ASSISTANT

## 2022-11-30 PROCEDURE — 73521 X-RAY EXAM HIPS BI 2 VIEWS: CPT | Mod: 26,,, | Performed by: RADIOLOGY

## 2022-11-30 PROCEDURE — 99214 OFFICE O/P EST MOD 30 MIN: CPT | Mod: S$PBB,,, | Performed by: PHYSICIAN ASSISTANT

## 2022-11-30 NOTE — LETTER
November 30, 2022      [facility]  [address] [place] [address]  [facility] [place] [address]  Phone: [phone]       Patient: Keshia Shepherd   YOB: 1985  Date of Visit: 11/30/2022    To Whom It May Concern:    Taylor Shepherd  was at [facility] on 11/30/2022. The patient may return to work/school on 12/2/22. If you have any questions or concerns, or if I can be of further assistance, please do not hesitate to contact me.    Sincerely,    Eusebia Alicea LPN

## 2022-12-12 NOTE — PROGRESS NOTES
Subjective:         Patient ID: Keshia Shepherd is a 37 y.o. female.    Chief Complaint: Low-back Pain, Hip Pain, and Knee Pain      Pain  This is a chronic problem. The current episode started more than 1 year ago. The problem occurs daily. The problem has been unchanged. Associated symptoms include arthralgias, myalgias and neck pain. Pertinent negatives include no anorexia, change in bowel habit, chest pain, chills, coughing, diaphoresis, fatigue, fever, rash, sore throat, vertigo or vomiting.   Review of Systems   Constitutional:  Negative for activity change, appetite change, chills, diaphoresis, fatigue, fever and unexpected weight change.   HENT:  Negative for drooling, ear discharge, ear pain, facial swelling, nosebleeds, sore throat, trouble swallowing, voice change and goiter.    Eyes:  Negative for photophobia, pain, discharge, redness and visual disturbance.   Respiratory:  Negative for apnea, cough, choking, chest tightness, shortness of breath, wheezing and stridor.    Cardiovascular:  Negative for chest pain, palpitations and leg swelling.   Gastrointestinal:  Negative for abdominal distention, anorexia, change in bowel habit, diarrhea, rectal pain, vomiting, fecal incontinence and change in bowel habit.   Endocrine: Negative for cold intolerance, heat intolerance, polydipsia, polyphagia and polyuria.   Genitourinary:  Negative for bladder incontinence, dysuria, flank pain, frequency, hematuria and hot flashes.   Musculoskeletal:  Positive for arthralgias, back pain, leg pain, myalgias, neck pain and neck stiffness. Negative for gait problem and joint deformity.   Integumentary:  Negative for color change, pallor and rash.   Allergic/Immunologic: Negative for immunocompromised state.   Neurological:  Negative for dizziness, vertigo, seizures, syncope, facial asymmetry, speech difficulty, light-headedness, coordination difficulties, memory loss and coordination difficulties.   Hematological:   "Negative for adenopathy. Does not bruise/bleed easily.   Psychiatric/Behavioral:  Negative for agitation, behavioral problems, confusion, decreased concentration, dysphoric mood, hallucinations, self-injury and suicidal ideas. The patient is not nervous/anxious and is not hyperactive.          Past Medical History:   Diagnosis Date    Asthma     Cervical radiculopathy     Chronic pain syndrome     Depressive disorder     Fibromyalgia     GERD (gastroesophageal reflux disease)     Hyperlipidemia     Osteoarthritis     Palpitations     Radiculopathy of cervical region      Past Surgical History:   Procedure Laterality Date    CARPAL TUNNEL RELEASE Bilateral     HYSTERECTOMY       Social History     Socioeconomic History    Marital status: Single   Tobacco Use    Smoking status: Never    Smokeless tobacco: Never   Substance and Sexual Activity    Alcohol use: Never    Drug use: Not Currently     Family History   Problem Relation Age of Onset    Arthritis Mother     Cancer Mother     Depression Mother     Diabetes Mother     Heart disease Mother     Hypertension Mother     Stroke Mother     Asthma Daughter     Arthritis Maternal Grandmother     Cancer Maternal Grandmother     Depression Maternal Grandmother     Diabetes Maternal Grandmother     Hypertension Maternal Grandmother     Heart disease Maternal Grandfather     Hypertension Maternal Grandfather     Cancer Paternal Grandmother     Depression Paternal Grandmother     Diabetes Paternal Grandmother      Review of patient's allergies indicates:   Allergen Reactions    Opioids - morphine analogues     Pcn [penicillins]         Objective:  Vitals:    12/14/22 0843   BP: 114/79   Pulse: 65   Resp: 18   Weight: 68.5 kg (151 lb)   Height: 5' 6" (1.676 m)   PainSc: 10-Worst pain ever         Physical Exam  Vitals and nursing note reviewed. Exam conducted with a chaperone present.   Constitutional:       General: She is awake. She is not in acute distress.     Appearance: " Normal appearance. She is not ill-appearing, toxic-appearing or diaphoretic.   HENT:      Head: Normocephalic and atraumatic.      Nose: Nose normal.      Mouth/Throat:      Mouth: Mucous membranes are moist.      Pharynx: Oropharynx is clear.   Eyes:      Conjunctiva/sclera: Conjunctivae normal.      Pupils: Pupils are equal, round, and reactive to light.   Cardiovascular:      Rate and Rhythm: Normal rate.   Pulmonary:      Effort: Pulmonary effort is normal. No respiratory distress.   Abdominal:      Palpations: Abdomen is soft.   Musculoskeletal:         General: No deformity or signs of injury. Normal range of motion.      Cervical back: Normal range of motion and neck supple.   Skin:     General: Skin is warm and dry.   Neurological:      General: No focal deficit present.      Mental Status: She is alert and oriented to person, place, and time. Mental status is at baseline.      Cranial Nerves: No cranial nerve deficit (II-XII).   Psychiatric:         Mood and Affect: Mood normal.         Behavior: Behavior normal. Behavior is cooperative.         Thought Content: Thought content normal.         MRI Lumbar Spine Without Contrast  Narrative: EXAMINATION:  MRI LUMBAR SPINE WITHOUT CONTRAST    CLINICAL HISTORY:  Lumbar radiculopathy, symptoms persist with conservative treatment; Radiculopathy, lumbar region    TECHNIQUE:  Multiplanar, multisequence MRI of the lumbar spine performed without the administration of contrast.    COMPARISON:  Radiograph lumbar spine 11/30/2022    FINDINGS:  The vertebral body heights, alignment, and signal intensity are normal.  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  Disc desiccation throughout.  Conus terminates L1-L2.    L1-L2: No spinal canal or foraminal stenosis.    L2-3: No spinal canal or foraminal stenosis.    L3-4: Shallow disc bulge eccentric to the right.  Facet degeneration.  No spinal canal stenosis.  Mild-to-moderate right foraminal stenosis.  No left foraminal  stenosis.    L4-5: Disc bulge and facet degeneration.  Mild spinal canal and bilateral foraminal stenosis.    L5-S1: Facet degeneration.  No spinal canal or foraminal stenosis.  Impression: Mild degenerative changes of the lumbar spine.    Electronically signed by: Zackery Carbone  Date:    12/13/2022  Time:    15:34       Lab Visit on 11/30/2022   Component Date Value Ref Range Status    YOHAN Screen 11/30/2022 Negative  Negative Final    C3 11/30/2022 142  90 - 180 mg/dL Final    C4 11/30/2022 36  10 - 40 mg/dL Final    Complement, Total 11/30/2022 60  30 - 75 U/mL Final    CCP 11/30/2022 <16.0  <=19.9 units Final    Sodium 11/30/2022 139  136 - 145 mmol/L Final    Potassium 11/30/2022 4.1  3.5 - 5.1 mmol/L Final    Chloride 11/30/2022 105  98 - 107 mmol/L Final    CO2 11/30/2022 29  21 - 32 mmol/L Final    Anion Gap 11/30/2022 9  7 - 16 mmol/L Final    Glucose 11/30/2022 86  74 - 106 mg/dL Final    BUN 11/30/2022 9  7 - 18 mg/dL Final    Creatinine 11/30/2022 0.76  0.55 - 1.02 mg/dL Final    BUN/Creatinine Ratio 11/30/2022 12  6 - 20 Final    Calcium 11/30/2022 9.9  8.5 - 10.1 mg/dL Final    Total Protein 11/30/2022 8.0  6.4 - 8.2 g/dL Final    Albumin 11/30/2022 4.2  3.5 - 5.0 g/dL Final    Globulin 11/30/2022 3.8  2.0 - 4.0 g/dL Final    A/G Ratio 11/30/2022 1.1   Final    Bilirubin, Total 11/30/2022 0.3  >0.0 - 1.2 mg/dL Final    Alk Phos 11/30/2022 125 (H)  37 - 98 U/L Final    ALT 11/30/2022 24  13 - 56 U/L Final    AST 11/30/2022 12 (L)  15 - 37 U/L Final    eGFR 11/30/2022 104  >=60 mL/min/1.73m² Final    RA Titer 11/30/2022 <10  0 - 15 IU/mL Final    CRP 11/30/2022 <0.29  0.00 - 0.80 mg/dL Final    ESR Westergren 11/30/2022 9  0 - 20 mm/Hr Final    Syphilis Ab Interpretation 11/30/2022 Non-Reactive  Non-Reactive Final    Free T4 11/30/2022 0.94  0.76 - 1.46 ng/dL Final    TSH 11/30/2022 1.740  0.358 - 3.740 uIU/mL Final    Lupus Anticoagulant Tech Interp 11/30/2022 SEE COMMENTS   Final    Prothrombin Time (PT), P  11/30/2022 11.9  9.4 - 12.5 sec Final    INR 11/30/2022 1.1  0.9 - 1.1 Final    Activated Partial Thrombopl Time, P 11/30/2022 36  25 - 37 sec Final    DRVVT Screen Ratio 11/30/2022 0.92  <1.20 ratio Final    Vitamin D 25-Hydroxy, Blood 11/30/2022 47.2  ng/mL Final    Hepatitis C Ab 11/30/2022 Non-Reactive  Non-Reactive Final    Uric Acid 11/30/2022 3.3  2.6 - 6.0 mg/dL Final    Antistreptolysin O (ASO) 11/30/2022 76.0  0.0 - 408.0 IU/mL Final    Anti-dsDNA 11/30/2022 Negative  Negative Final    Anti-DSDNA (IU) 11/30/2022 3  0 - 24 IU Final    West Nile Virus Ab, IgG 11/30/2022 Negative  Negative Final    West Nile Virus Ab, IgM 11/30/2022 Negative  Negative Final    West Nile Serum Interpretation 11/30/2022 SEE COMMENTS   Final    Anaplasma phagocytophilum Ab, IgG,S 11/30/2022 <1:64  <1:64 titer Final    Ehrlichia Chaffeensis (HME) Ab, IgG 11/30/2022 <1:64  <1:64 titer Final    Spotted Fever Group Ab, IgG, S 11/30/2022 <1:64  <1:64 Final    Spotted Fever Group Ab, IgM, S 11/30/2022 <1:64  <1:64 Final    Lyme IgG/IgM 11/30/2022 Non-Reactive  Non-Reactive Final    CK 11/30/2022 114  26 - 192 U/L Final    Nil Result, TB 11/30/2022 0.05   Final    TB1 Ag minus Nil Result 11/30/2022 0.11   Final    TB2 Ag minus Nil Result 11/30/2022 0.07   Final    Mitogen minus Nil Result, TB 11/30/2022 >10.00   Final    QuantiFERON-Tb Gold Plus 11/30/2022 Negative  Negative Final    WBC 11/30/2022 3.43 (L)  4.50 - 11.00 K/uL Final    RBC 11/30/2022 5.04  4.20 - 5.40 M/uL Final    Hemoglobin 11/30/2022 11.7 (L)  12.0 - 16.0 g/dL Final    Hematocrit 11/30/2022 38.4  38.0 - 47.0 % Final    MCV 11/30/2022 76.2 (L)  80.0 - 96.0 fL Final    MCH 11/30/2022 23.2 (L)  27.0 - 31.0 pg Final    MCHC 11/30/2022 30.5 (L)  32.0 - 36.0 g/dL Final    RDW 11/30/2022 14.1  11.5 - 14.5 % Final    Platelet Count 11/30/2022 365  150 - 400 K/uL Final    MPV 11/30/2022 9.4  9.4 - 12.4 fL Final    Neutrophils % 11/30/2022 46.1 (L)  53.0 - 65.0 % Final     Lymphocytes % 11/30/2022 46.9 (H)  27.0 - 41.0 % Final    Monocytes % 11/30/2022 5.8  2.0 - 6.0 % Final    Eosinophils % 11/30/2022 0.9 (L)  1.0 - 4.0 % Final    Basophils % 11/30/2022 0.3  0.0 - 1.0 % Final    Immature Granulocytes % 11/30/2022 0.0  0.0 - 0.4 % Final    nRBC, Auto 11/30/2022 0.0  <=0.0 % Final    Neutrophils, Abs 11/30/2022 1.58 (L)  1.80 - 7.70 K/uL Final    Lymphocytes, Absolute 11/30/2022 1.61  1.00 - 4.80 K/uL Final    Monocytes, Absolute 11/30/2022 0.20  0.00 - 0.80 K/uL Final    Eosinophils, Absolute 11/30/2022 0.03  0.00 - 0.50 K/uL Final    Basophils, Absolute 11/30/2022 0.01  0.00 - 0.20 K/uL Final    Immature Granulocytes, Absolute 11/30/2022 0.00  0.00 - 0.04 K/uL Final    nRBC, Absolute 11/30/2022 0.00  <=0.00 x10e3/uL Final    Diff Type 11/30/2022 Auto   Final    HLA-B27 Result 11/30/2022 Negative  Not Applicable Final    Interpretation 11/30/2022 SEE COMMENTS   Final         No orders of the defined types were placed in this encounter.        Requested Prescriptions      No prescriptions requested or ordered in this encounter       Assessment:     1. Chronic pain of both knees    2. Lumbosacral radiculopathy           Plan:    Currently not using any nonnarcotic are narcotic medication    Complaint back pain buttock and leg pain bilateral knee pain her buttock and leg pains radicular nature pain numbness and tingling worse with standing walking laughing or coughing this has been ongoing for more than a year     Her knee pain sacrum discomfort has been ongoing for more than a year    Continues complain multiple joint pain muscle pain ongoing for more than a year    Labs reviewed vitamin-D level normal no inflammatory markers elevated    MRI lumbar spine [facility] November 30, 2022 L3/4 far lateral disc bulge right greater than left multiple level degenerative changes     X-ray bilateral knees [facility] November 30, 2022 degenerative changes no fracture noted    X-ray lumbar  spine [facility] November 30, 2022 degenerative changes no fracture noted    X-ray hips [facility] November 30, 2022 degenerative changes no fracture noted    After discussing options she would like to proceed with    Indications for this procedure for this specific patient include the following     - Injections being provided as part of a comprehensive pain management program.    - Pt has failed 6 weeks of conservative therapy including oral meds, PT and or home exercise program which has been discussed with the patient   - Injection being provided for suspected radicular pain.    - Pain scale of greater than or equal to 3/10 with functional impairment  - No evidence of local or systemic infection, bleeding tendency or unstable medical condition.    - Pain is causing significant functional limitation resulting in diminished quality of life and impaired age appropriate ADL's.   - Repeat injections are done no sooner than 7 days after the previous injection  - Epidural done for suspected radicular pain along dermatome of nerve   - Epidural done to differentiate level of radicular nerve root pain   - Repeat injections done only when pt reports 50% improvement in pain from previous injections    -increased level of function  - patient is aware if they take any NSAIDs/anticoagulant prior to procedure procedure will be postponed, this was listed on the preop instructions and highlighted, list of NSAIDs/anticoagulant reviewed with patient to include methotrexate  - Injection done at L3/4 level(s) which is consistent with patient's dermatomal pain complaint  The planned medically necessary  surgical procedure is performed in a hospital outpatient department and not in an ambulatory surgical center due to:     -there is no geographically assessable ambulatory surgery center that has the  necessary equipment and fluoroscopy needed for the procedure     -there is no geographically assessable ambulatory surgical center  available at which the physician has privileges     -an ASC's  specific  guideline regarding the individuals weight or health conditions that prevent the use of an ASC    Monitor anesthesia request is medically indicated for the scheduled nerve block procedure due to:  1- needle phobia and anxiety, placing  the patient at risk during the provided service.  2-patient has an ASA class greater than 3 and requires constant presence of an anesthesiologist during the procedure,   3-patient has severe problems hard to lie still  4-patient suffers from chronic pain and is unable to function due to  diminished ADLs    Schedule lumbar L3/4 CARMEN # 1, lumbar radiculopathy     Dr. Walters

## 2022-12-12 NOTE — H&P (VIEW-ONLY)
Subjective:         Patient ID: Keshia Shepherd is a 37 y.o. female.    Chief Complaint: Low-back Pain, Hip Pain, and Knee Pain      Pain  This is a chronic problem. The current episode started more than 1 year ago. The problem occurs daily. The problem has been unchanged. Associated symptoms include arthralgias, myalgias and neck pain. Pertinent negatives include no anorexia, change in bowel habit, chest pain, chills, coughing, diaphoresis, fatigue, fever, rash, sore throat, vertigo or vomiting.   Review of Systems   Constitutional:  Negative for activity change, appetite change, chills, diaphoresis, fatigue, fever and unexpected weight change.   HENT:  Negative for drooling, ear discharge, ear pain, facial swelling, nosebleeds, sore throat, trouble swallowing, voice change and goiter.    Eyes:  Negative for photophobia, pain, discharge, redness and visual disturbance.   Respiratory:  Negative for apnea, cough, choking, chest tightness, shortness of breath, wheezing and stridor.    Cardiovascular:  Negative for chest pain, palpitations and leg swelling.   Gastrointestinal:  Negative for abdominal distention, anorexia, change in bowel habit, diarrhea, rectal pain, vomiting, fecal incontinence and change in bowel habit.   Endocrine: Negative for cold intolerance, heat intolerance, polydipsia, polyphagia and polyuria.   Genitourinary:  Negative for bladder incontinence, dysuria, flank pain, frequency, hematuria and hot flashes.   Musculoskeletal:  Positive for arthralgias, back pain, leg pain, myalgias, neck pain and neck stiffness. Negative for gait problem and joint deformity.   Integumentary:  Negative for color change, pallor and rash.   Allergic/Immunologic: Negative for immunocompromised state.   Neurological:  Negative for dizziness, vertigo, seizures, syncope, facial asymmetry, speech difficulty, light-headedness, coordination difficulties, memory loss and coordination difficulties.   Hematological:   "Negative for adenopathy. Does not bruise/bleed easily.   Psychiatric/Behavioral:  Negative for agitation, behavioral problems, confusion, decreased concentration, dysphoric mood, hallucinations, self-injury and suicidal ideas. The patient is not nervous/anxious and is not hyperactive.          Past Medical History:   Diagnosis Date    Asthma     Cervical radiculopathy     Chronic pain syndrome     Depressive disorder     Fibromyalgia     GERD (gastroesophageal reflux disease)     Hyperlipidemia     Osteoarthritis     Palpitations     Radiculopathy of cervical region      Past Surgical History:   Procedure Laterality Date    CARPAL TUNNEL RELEASE Bilateral     HYSTERECTOMY       Social History     Socioeconomic History    Marital status: Single   Tobacco Use    Smoking status: Never    Smokeless tobacco: Never   Substance and Sexual Activity    Alcohol use: Never    Drug use: Not Currently     Family History   Problem Relation Age of Onset    Arthritis Mother     Cancer Mother     Depression Mother     Diabetes Mother     Heart disease Mother     Hypertension Mother     Stroke Mother     Asthma Daughter     Arthritis Maternal Grandmother     Cancer Maternal Grandmother     Depression Maternal Grandmother     Diabetes Maternal Grandmother     Hypertension Maternal Grandmother     Heart disease Maternal Grandfather     Hypertension Maternal Grandfather     Cancer Paternal Grandmother     Depression Paternal Grandmother     Diabetes Paternal Grandmother      Review of patient's allergies indicates:   Allergen Reactions    Opioids - morphine analogues     Pcn [penicillins]         Objective:  Vitals:    12/14/22 0843   BP: 114/79   Pulse: 65   Resp: 18   Weight: 68.5 kg (151 lb)   Height: 5' 6" (1.676 m)   PainSc: 10-Worst pain ever         Physical Exam  Vitals and nursing note reviewed. Exam conducted with a chaperone present.   Constitutional:       General: She is awake. She is not in acute distress.     Appearance: " Normal appearance. She is not ill-appearing, toxic-appearing or diaphoretic.   HENT:      Head: Normocephalic and atraumatic.      Nose: Nose normal.      Mouth/Throat:      Mouth: Mucous membranes are moist.      Pharynx: Oropharynx is clear.   Eyes:      Conjunctiva/sclera: Conjunctivae normal.      Pupils: Pupils are equal, round, and reactive to light.   Cardiovascular:      Rate and Rhythm: Normal rate.   Pulmonary:      Effort: Pulmonary effort is normal. No respiratory distress.   Abdominal:      Palpations: Abdomen is soft.   Musculoskeletal:         General: No deformity or signs of injury. Normal range of motion.      Cervical back: Normal range of motion and neck supple.   Skin:     General: Skin is warm and dry.   Neurological:      General: No focal deficit present.      Mental Status: She is alert and oriented to person, place, and time. Mental status is at baseline.      Cranial Nerves: No cranial nerve deficit (II-XII).   Psychiatric:         Mood and Affect: Mood normal.         Behavior: Behavior normal. Behavior is cooperative.         Thought Content: Thought content normal.         MRI Lumbar Spine Without Contrast  Narrative: EXAMINATION:  MRI LUMBAR SPINE WITHOUT CONTRAST    CLINICAL HISTORY:  Lumbar radiculopathy, symptoms persist with conservative treatment; Radiculopathy, lumbar region    TECHNIQUE:  Multiplanar, multisequence MRI of the lumbar spine performed without the administration of contrast.    COMPARISON:  Radiograph lumbar spine 11/30/2022    FINDINGS:  The vertebral body heights, alignment, and signal intensity are normal.  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  Disc desiccation throughout.  Conus terminates L1-L2.    L1-L2: No spinal canal or foraminal stenosis.    L2-3: No spinal canal or foraminal stenosis.    L3-4: Shallow disc bulge eccentric to the right.  Facet degeneration.  No spinal canal stenosis.  Mild-to-moderate right foraminal stenosis.  No left foraminal  stenosis.    L4-5: Disc bulge and facet degeneration.  Mild spinal canal and bilateral foraminal stenosis.    L5-S1: Facet degeneration.  No spinal canal or foraminal stenosis.  Impression: Mild degenerative changes of the lumbar spine.    Electronically signed by: Zackery Carbone  Date:    12/13/2022  Time:    15:34       Lab Visit on 11/30/2022   Component Date Value Ref Range Status    YOHAN Screen 11/30/2022 Negative  Negative Final    C3 11/30/2022 142  90 - 180 mg/dL Final    C4 11/30/2022 36  10 - 40 mg/dL Final    Complement, Total 11/30/2022 60  30 - 75 U/mL Final    CCP 11/30/2022 <16.0  <=19.9 units Final    Sodium 11/30/2022 139  136 - 145 mmol/L Final    Potassium 11/30/2022 4.1  3.5 - 5.1 mmol/L Final    Chloride 11/30/2022 105  98 - 107 mmol/L Final    CO2 11/30/2022 29  21 - 32 mmol/L Final    Anion Gap 11/30/2022 9  7 - 16 mmol/L Final    Glucose 11/30/2022 86  74 - 106 mg/dL Final    BUN 11/30/2022 9  7 - 18 mg/dL Final    Creatinine 11/30/2022 0.76  0.55 - 1.02 mg/dL Final    BUN/Creatinine Ratio 11/30/2022 12  6 - 20 Final    Calcium 11/30/2022 9.9  8.5 - 10.1 mg/dL Final    Total Protein 11/30/2022 8.0  6.4 - 8.2 g/dL Final    Albumin 11/30/2022 4.2  3.5 - 5.0 g/dL Final    Globulin 11/30/2022 3.8  2.0 - 4.0 g/dL Final    A/G Ratio 11/30/2022 1.1   Final    Bilirubin, Total 11/30/2022 0.3  >0.0 - 1.2 mg/dL Final    Alk Phos 11/30/2022 125 (H)  37 - 98 U/L Final    ALT 11/30/2022 24  13 - 56 U/L Final    AST 11/30/2022 12 (L)  15 - 37 U/L Final    eGFR 11/30/2022 104  >=60 mL/min/1.73m² Final    RA Titer 11/30/2022 <10  0 - 15 IU/mL Final    CRP 11/30/2022 <0.29  0.00 - 0.80 mg/dL Final    ESR Westergren 11/30/2022 9  0 - 20 mm/Hr Final    Syphilis Ab Interpretation 11/30/2022 Non-Reactive  Non-Reactive Final    Free T4 11/30/2022 0.94  0.76 - 1.46 ng/dL Final    TSH 11/30/2022 1.740  0.358 - 3.740 uIU/mL Final    Lupus Anticoagulant Tech Interp 11/30/2022 SEE COMMENTS   Final    Prothrombin Time (PT), P  11/30/2022 11.9  9.4 - 12.5 sec Final    INR 11/30/2022 1.1  0.9 - 1.1 Final    Activated Partial Thrombopl Time, P 11/30/2022 36  25 - 37 sec Final    DRVVT Screen Ratio 11/30/2022 0.92  <1.20 ratio Final    Vitamin D 25-Hydroxy, Blood 11/30/2022 47.2  ng/mL Final    Hepatitis C Ab 11/30/2022 Non-Reactive  Non-Reactive Final    Uric Acid 11/30/2022 3.3  2.6 - 6.0 mg/dL Final    Antistreptolysin O (ASO) 11/30/2022 76.0  0.0 - 408.0 IU/mL Final    Anti-dsDNA 11/30/2022 Negative  Negative Final    Anti-DSDNA (IU) 11/30/2022 3  0 - 24 IU Final    West Nile Virus Ab, IgG 11/30/2022 Negative  Negative Final    West Nile Virus Ab, IgM 11/30/2022 Negative  Negative Final    West Nile Serum Interpretation 11/30/2022 SEE COMMENTS   Final    Anaplasma phagocytophilum Ab, IgG,S 11/30/2022 <1:64  <1:64 titer Final    Ehrlichia Chaffeensis (HME) Ab, IgG 11/30/2022 <1:64  <1:64 titer Final    Spotted Fever Group Ab, IgG, S 11/30/2022 <1:64  <1:64 Final    Spotted Fever Group Ab, IgM, S 11/30/2022 <1:64  <1:64 Final    Lyme IgG/IgM 11/30/2022 Non-Reactive  Non-Reactive Final    CK 11/30/2022 114  26 - 192 U/L Final    Nil Result, TB 11/30/2022 0.05   Final    TB1 Ag minus Nil Result 11/30/2022 0.11   Final    TB2 Ag minus Nil Result 11/30/2022 0.07   Final    Mitogen minus Nil Result, TB 11/30/2022 >10.00   Final    QuantiFERON-Tb Gold Plus 11/30/2022 Negative  Negative Final    WBC 11/30/2022 3.43 (L)  4.50 - 11.00 K/uL Final    RBC 11/30/2022 5.04  4.20 - 5.40 M/uL Final    Hemoglobin 11/30/2022 11.7 (L)  12.0 - 16.0 g/dL Final    Hematocrit 11/30/2022 38.4  38.0 - 47.0 % Final    MCV 11/30/2022 76.2 (L)  80.0 - 96.0 fL Final    MCH 11/30/2022 23.2 (L)  27.0 - 31.0 pg Final    MCHC 11/30/2022 30.5 (L)  32.0 - 36.0 g/dL Final    RDW 11/30/2022 14.1  11.5 - 14.5 % Final    Platelet Count 11/30/2022 365  150 - 400 K/uL Final    MPV 11/30/2022 9.4  9.4 - 12.4 fL Final    Neutrophils % 11/30/2022 46.1 (L)  53.0 - 65.0 % Final     Lymphocytes % 11/30/2022 46.9 (H)  27.0 - 41.0 % Final    Monocytes % 11/30/2022 5.8  2.0 - 6.0 % Final    Eosinophils % 11/30/2022 0.9 (L)  1.0 - 4.0 % Final    Basophils % 11/30/2022 0.3  0.0 - 1.0 % Final    Immature Granulocytes % 11/30/2022 0.0  0.0 - 0.4 % Final    nRBC, Auto 11/30/2022 0.0  <=0.0 % Final    Neutrophils, Abs 11/30/2022 1.58 (L)  1.80 - 7.70 K/uL Final    Lymphocytes, Absolute 11/30/2022 1.61  1.00 - 4.80 K/uL Final    Monocytes, Absolute 11/30/2022 0.20  0.00 - 0.80 K/uL Final    Eosinophils, Absolute 11/30/2022 0.03  0.00 - 0.50 K/uL Final    Basophils, Absolute 11/30/2022 0.01  0.00 - 0.20 K/uL Final    Immature Granulocytes, Absolute 11/30/2022 0.00  0.00 - 0.04 K/uL Final    nRBC, Absolute 11/30/2022 0.00  <=0.00 x10e3/uL Final    Diff Type 11/30/2022 Auto   Final    HLA-B27 Result 11/30/2022 Negative  Not Applicable Final    Interpretation 11/30/2022 SEE COMMENTS   Final         No orders of the defined types were placed in this encounter.        Requested Prescriptions      No prescriptions requested or ordered in this encounter       Assessment:     1. Chronic pain of both knees    2. Lumbosacral radiculopathy           Plan:    Currently not using any nonnarcotic are narcotic medication    Complaint back pain buttock and leg pain bilateral knee pain her buttock and leg pains radicular nature pain numbness and tingling worse with standing walking laughing or coughing this has been ongoing for more than a year     Her knee pain sacrum discomfort has been ongoing for more than a year    Continues complain multiple joint pain muscle pain ongoing for more than a year    Labs reviewed vitamin-D level normal no inflammatory markers elevated    MRI lumbar spine [facility] November 30, 2022 L3/4 far lateral disc bulge right greater than left multiple level degenerative changes     X-ray bilateral knees [facility] November 30, 2022 degenerative changes no fracture noted    X-ray lumbar  spine [facility] November 30, 2022 degenerative changes no fracture noted    X-ray hips [facility] November 30, 2022 degenerative changes no fracture noted    After discussing options she would like to proceed with    Indications for this procedure for this specific patient include the following     - Injections being provided as part of a comprehensive pain management program.    - Pt has failed 6 weeks of conservative therapy including oral meds, PT and or home exercise program which has been discussed with the patient   - Injection being provided for suspected radicular pain.    - Pain scale of greater than or equal to 3/10 with functional impairment  - No evidence of local or systemic infection, bleeding tendency or unstable medical condition.    - Pain is causing significant functional limitation resulting in diminished quality of life and impaired age appropriate ADL's.   - Repeat injections are done no sooner than 7 days after the previous injection  - Epidural done for suspected radicular pain along dermatome of nerve   - Epidural done to differentiate level of radicular nerve root pain   - Repeat injections done only when pt reports 50% improvement in pain from previous injections    -increased level of function  - patient is aware if they take any NSAIDs/anticoagulant prior to procedure procedure will be postponed, this was listed on the preop instructions and highlighted, list of NSAIDs/anticoagulant reviewed with patient to include methotrexate  - Injection done at L3/4 level(s) which is consistent with patient's dermatomal pain complaint  The planned medically necessary  surgical procedure is performed in a hospital outpatient department and not in an ambulatory surgical center due to:     -there is no geographically assessable ambulatory surgery center that has the  necessary equipment and fluoroscopy needed for the procedure     -there is no geographically assessable ambulatory surgical center  available at which the physician has privileges     -an ASC's  specific  guideline regarding the individuals weight or health conditions that prevent the use of an ASC    Monitor anesthesia request is medically indicated for the scheduled nerve block procedure due to:  1- needle phobia and anxiety, placing  the patient at risk during the provided service.  2-patient has an ASA class greater than 3 and requires constant presence of an anesthesiologist during the procedure,   3-patient has severe problems hard to lie still  4-patient suffers from chronic pain and is unable to function due to  diminished ADLs    Schedule lumbar L3/4 CARMEN # 1, lumbar radiculopathy     Dr. Walters

## 2022-12-14 ENCOUNTER — OFFICE VISIT (OUTPATIENT)
Dept: PAIN MEDICINE | Facility: CLINIC | Age: 37
End: 2022-12-14
Payer: COMMERCIAL

## 2022-12-14 VITALS
WEIGHT: 151 LBS | RESPIRATION RATE: 18 BRPM | BODY MASS INDEX: 24.27 KG/M2 | HEART RATE: 65 BPM | DIASTOLIC BLOOD PRESSURE: 79 MMHG | SYSTOLIC BLOOD PRESSURE: 114 MMHG | HEIGHT: 66 IN

## 2022-12-14 DIAGNOSIS — M25.561 CHRONIC PAIN OF BOTH KNEES: Chronic | ICD-10-CM

## 2022-12-14 DIAGNOSIS — M25.562 CHRONIC PAIN OF BOTH KNEES: Chronic | ICD-10-CM

## 2022-12-14 DIAGNOSIS — G89.29 CHRONIC PAIN OF BOTH KNEES: Chronic | ICD-10-CM

## 2022-12-14 DIAGNOSIS — M54.16 LUMBAR RADICULOPATHY, CHRONIC: Primary | Chronic | ICD-10-CM

## 2022-12-14 PROCEDURE — 99214 OFFICE O/P EST MOD 30 MIN: CPT | Mod: S$PBB,,, | Performed by: PHYSICIAN ASSISTANT

## 2022-12-14 PROCEDURE — 3074F SYST BP LT 130 MM HG: CPT | Mod: ,,, | Performed by: PHYSICIAN ASSISTANT

## 2022-12-14 PROCEDURE — 99214 PR OFFICE/OUTPT VISIT, EST, LEVL IV, 30-39 MIN: ICD-10-PCS | Mod: S$PBB,,, | Performed by: PHYSICIAN ASSISTANT

## 2022-12-14 PROCEDURE — 3008F PR BODY MASS INDEX (BMI) DOCUMENTED: ICD-10-PCS | Mod: ,,, | Performed by: PHYSICIAN ASSISTANT

## 2022-12-14 PROCEDURE — 1159F PR MEDICATION LIST DOCUMENTED IN MEDICAL RECORD: ICD-10-PCS | Mod: ,,, | Performed by: PHYSICIAN ASSISTANT

## 2022-12-14 PROCEDURE — 3008F BODY MASS INDEX DOCD: CPT | Mod: ,,, | Performed by: PHYSICIAN ASSISTANT

## 2022-12-14 PROCEDURE — 99215 OFFICE O/P EST HI 40 MIN: CPT | Mod: PBBFAC | Performed by: PHYSICIAN ASSISTANT

## 2022-12-14 PROCEDURE — 3074F PR MOST RECENT SYSTOLIC BLOOD PRESSURE < 130 MM HG: ICD-10-PCS | Mod: ,,, | Performed by: PHYSICIAN ASSISTANT

## 2022-12-14 PROCEDURE — 3078F PR MOST RECENT DIASTOLIC BLOOD PRESSURE < 80 MM HG: ICD-10-PCS | Mod: ,,, | Performed by: PHYSICIAN ASSISTANT

## 2022-12-14 PROCEDURE — 3078F DIAST BP <80 MM HG: CPT | Mod: ,,, | Performed by: PHYSICIAN ASSISTANT

## 2022-12-14 PROCEDURE — 1159F MED LIST DOCD IN RCRD: CPT | Mod: ,,, | Performed by: PHYSICIAN ASSISTANT

## 2022-12-14 NOTE — PATIENT INSTRUCTIONS
Procedure Instructions:    Nothing to eat or drink for 8 hours or after midnight including gum, candy, mints, or tobacco products.  If you are scheduled for 1:30 or later nothing to eat or drink after 5 a.m. the morning of the procedure, including gum, candy, mints, or tobacco products.  Must have a  at least 18 yrs of age to stay present at all times  No Diabetic medications the morning of procedure, check blood sugar the morning of procedure, if it is greater than 200 call the office at [phone]  If you are started on antibiotics or have been prescribed antibiotics, have a fever, or have any other type of infection call to reschedule procedure.  If you take blood pressure medications you can take it at your regular scheduled time with a small sip of WATER!    HOLD ASPIRIN AND ASPIRIN PRODUCTS  (ASPIRIN, BC POWDER ETC. ) FOR 7 DAYS  PRIOR TO PROCEDURE  HOLD NSAIDS( ibuprofen, mobic, meloxicam, advil, diclofenac, naproxen, relafen, celebrex,  methotrexate, aleve etc....)  FOR 3 DAYS   PRIOR TO PROCEDURE

## 2022-12-29 ENCOUNTER — ANESTHESIA EVENT (OUTPATIENT)
Dept: PAIN MEDICINE | Facility: HOSPITAL | Age: 37
End: 2022-12-29
Payer: COMMERCIAL

## 2022-12-29 ENCOUNTER — ANESTHESIA (OUTPATIENT)
Dept: PAIN MEDICINE | Facility: HOSPITAL | Age: 37
End: 2022-12-29
Payer: COMMERCIAL

## 2022-12-29 ENCOUNTER — HOSPITAL ENCOUNTER (OUTPATIENT)
Facility: HOSPITAL | Age: 37
Discharge: HOME | End: 2022-12-29
Attending: PAIN MEDICINE | Admitting: PAIN MEDICINE
Payer: COMMERCIAL

## 2022-12-29 VITALS
OXYGEN SATURATION: 100 % | HEART RATE: 87 BPM | RESPIRATION RATE: 12 BRPM | TEMPERATURE: 98 F | SYSTOLIC BLOOD PRESSURE: 113 MMHG | WEIGHT: 153 LBS | DIASTOLIC BLOOD PRESSURE: 68 MMHG | HEIGHT: 66 IN | BODY MASS INDEX: 24.59 KG/M2

## 2022-12-29 DIAGNOSIS — M54.16 RADICULOPATHY OF LUMBAR REGION: ICD-10-CM

## 2022-12-29 DIAGNOSIS — M54.16 LUMBAR RADICULOPATHY, CHRONIC: Primary | Chronic | ICD-10-CM

## 2022-12-29 PROCEDURE — 37000008 HC ANESTHESIA 1ST 15 MINUTES: Performed by: PAIN MEDICINE

## 2022-12-29 PROCEDURE — D9220A PRA ANESTHESIA: Mod: ,,, | Performed by: NURSE ANESTHETIST, CERTIFIED REGISTERED

## 2022-12-29 PROCEDURE — D9220A PRA ANESTHESIA: ICD-10-PCS | Mod: ,,, | Performed by: NURSE ANESTHETIST, CERTIFIED REGISTERED

## 2022-12-29 PROCEDURE — 62323 NJX INTERLAMINAR LMBR/SAC: CPT | Mod: ,,, | Performed by: PAIN MEDICINE

## 2022-12-29 PROCEDURE — 62323 PR INJ LUMBAR/SACRAL, W/IMAGING GUIDANCE: ICD-10-PCS | Mod: ,,, | Performed by: PAIN MEDICINE

## 2022-12-29 PROCEDURE — 25000003 PHARM REV CODE 250: Performed by: NURSE ANESTHETIST, CERTIFIED REGISTERED

## 2022-12-29 PROCEDURE — 63600175 PHARM REV CODE 636 W HCPCS: Performed by: NURSE ANESTHETIST, CERTIFIED REGISTERED

## 2022-12-29 PROCEDURE — 25500020 PHARM REV CODE 255: Performed by: PAIN MEDICINE

## 2022-12-29 PROCEDURE — 62323 NJX INTERLAMINAR LMBR/SAC: CPT | Performed by: PAIN MEDICINE

## 2022-12-29 PROCEDURE — 27000284 HC CANNULA NASAL: Performed by: NURSE ANESTHETIST, CERTIFIED REGISTERED

## 2022-12-29 PROCEDURE — 25000003 PHARM REV CODE 250: Performed by: PAIN MEDICINE

## 2022-12-29 PROCEDURE — 63600175 PHARM REV CODE 636 W HCPCS: Performed by: PAIN MEDICINE

## 2022-12-29 RX ORDER — TRIAMCINOLONE ACETONIDE 40 MG/ML
INJECTION, SUSPENSION INTRA-ARTICULAR; INTRAMUSCULAR CODE/TRAUMA/SEDATION MEDICATION
Status: DISCONTINUED | OUTPATIENT
Start: 2022-12-29 | End: 2022-12-29 | Stop reason: HOSPADM

## 2022-12-29 RX ORDER — PROPOFOL 10 MG/ML
VIAL (ML) INTRAVENOUS
Status: DISCONTINUED | OUTPATIENT
Start: 2022-12-29 | End: 2022-12-29

## 2022-12-29 RX ORDER — SODIUM CHLORIDE 9 MG/ML
INJECTION, SOLUTION INTRAVENOUS CONTINUOUS
Status: DISCONTINUED | OUTPATIENT
Start: 2022-12-29 | End: 2022-12-29 | Stop reason: HOSPADM

## 2022-12-29 RX ORDER — LIDOCAINE HYDROCHLORIDE 20 MG/ML
INJECTION INTRAVENOUS
Status: DISCONTINUED | OUTPATIENT
Start: 2022-12-29 | End: 2022-12-29

## 2022-12-29 RX ORDER — IOPAMIDOL 612 MG/ML
INJECTION, SOLUTION INTRATHECAL CODE/TRAUMA/SEDATION MEDICATION
Status: DISCONTINUED | OUTPATIENT
Start: 2022-12-29 | End: 2022-12-29 | Stop reason: HOSPADM

## 2022-12-29 RX ADMIN — SODIUM CHLORIDE: 9 INJECTION, SOLUTION INTRAVENOUS at 03:12

## 2022-12-29 RX ADMIN — PROPOFOL 50 MG: 10 INJECTION, EMULSION INTRAVENOUS at 03:12

## 2022-12-29 RX ADMIN — LIDOCAINE HYDROCHLORIDE 50 MG: 20 INJECTION, SOLUTION INTRAVENOUS at 03:12

## 2022-12-29 NOTE — ANESTHESIA PREPROCEDURE EVALUATION
12/29/2022  Keshia Shepherd is a 37 y.o., female.      Pre-op Assessment    I have reviewed the Patient Summary Reports.    I have reviewed the NPO Status.   I have reviewed the Medications.     Review of Systems  Anesthesia Hx:  No problems with previous Anesthesia    Hematology/Oncology:  Hematology Normal   Oncology Normal     EENT/Dental:EENT/Dental Normal   Cardiovascular:  Cardiovascular Normal Exercise tolerance: good  NYHA Classification II    Pulmonary:   Asthma    Renal/:  Renal/ Normal     Hepatic/GI:   GERD    Musculoskeletal:   Arthritis     Neurological:   Neuromuscular Disease,    Endocrine:  Endocrine Normal    Dermatological:  Skin Normal    Psych:   Psychiatric History          Physical Exam  General: Well nourished, Cooperative, Alert and Oriented    Airway:  Mallampati: II   Mouth Opening: Normal  TM Distance: Normal  Tongue: Normal  Neck ROM: Normal ROM    Dental:  Intact    Chest/Lungs:  Normal Respiratory Rate    Heart:  Rate: Normal  Rhythm: Regular Rhythm        Anesthesia Plan  Type of Anesthesia, risks & benefits discussed:    Anesthesia Type: Gen Natural Airway  Intra-op Monitoring Plan: Standard ASA Monitors  Post Op Pain Control Plan: multimodal analgesia  Induction:  IV  Informed Consent: Informed consent signed with the Patient and all parties understand the risks and agree with anesthesia plan.  All questions answered. Patient consented to blood products? Yes  ASA Score: 2  Day of Surgery Review of History & Physical: I have interviewed and examined the patient. I have reviewed the patient's H&P dated:     Ready For Surgery From Anesthesia Perspective.     .    Past Medical History:   Diagnosis Date    Asthma     Cervical radiculopathy     Chronic pain syndrome     Depressive disorder     Fibromyalgia     GERD (gastroesophageal reflux disease)     Hyperlipidemia      Osteoarthritis     Palpitations     Radiculopathy of cervical region         Current Facility-Administered Medications   Medication    0.9%  NaCl infusion

## 2022-12-29 NOTE — ANESTHESIA POSTPROCEDURE EVALUATION
Anesthesia Post Evaluation    Patient: Keshia Shepherd    Procedure(s) Performed: Procedure(s) (LRB):  Injection, Steroid, Epidural, L3/4 (N/A)    Final Anesthesia Type: general      Patient location: [facility].  Patient participation: Yes- Able to Participate  Level of consciousness: awake and alert  Post-procedure vital signs: reviewed and stable  Pain management: adequate  Airway patency: patent    PONV status at discharge: No PONV  Anesthetic complications: no      Cardiovascular status: blood pressure returned to baseline, hemodynamically stable and stable  Respiratory status: unassisted, spontaneous ventilation and room air  Hydration status: euvolemic  Follow-up not needed.  Comments: Pt voices appreciation for care          Vitals Value Taken Time   /80 12/29/22 1541   Temp 36.4 °C (97.6 °F) 12/29/22 1539   Pulse 79 12/29/22 1543   Resp 15 12/29/22 1543   SpO2 100 % 12/29/22 1543   Vitals shown include unvalidated device data.      No case tracking events are documented in the log.      Pain/Jack Score: Jack Score: 10 (12/29/2022  3:39 PM)

## 2022-12-29 NOTE — TRANSFER OF CARE
"Anesthesia Transfer of Care Note    Patient: Keshia Shepherd    Procedure(s) Performed: Procedure(s) (LRB):  Injection, Steroid, Epidural, L3/4 (N/A)    Patient location: Other: [facility]    Anesthesia Type: general    Transport from OR: Transported from OR on room air with adequate spontaneous ventilation    Post pain: adequate analgesia    Post assessment: no apparent anesthetic complications    Post vital signs: stable    Level of consciousness: responds to stimulation    Nausea/Vomiting: no nausea/vomiting    Complications: none    Transfer of care protocol was followedComments: Good SV continue, NAD noted, VSS, RTRN      Last vitals:   Visit Vitals  /68 (Patient Position: Lying)   Pulse 87   Temp 36.4 °C (97.6 °F)   Resp 12   Ht 5' 6" (1.676 m)   Wt 69.4 kg (153 lb)   SpO2 100%   BMI 24.69 kg/m²     "

## 2022-12-31 NOTE — BRIEF OP NOTE
Discharge Note  Short Stay    Admit Date: 12/29/2022    Discharge Date: 12/29/2022    Attending Physician: Vanessa Walters     Discharge Provider: Vanessa Walters    Diagnoses: Lumbar radiculopathy    Discharged Condition: Good    Final Diagnoses: Lumbar radiculopathy, chronic [M54.16]    Disposition: Home or Self Care    Hospital Course: No complications, uneventful    Outcome of Hospitalization, Treatment, Procedure, or Surgery:  Patient was admitted for outpatient interventional pain management procedure. The patient tolerated the procedure well with no complications.    Follow up/Patient Instructions:  Follow up as scheduled in Pain Management office in 3-4 weeks.  Patient has received instructions and follow up date and time.    Medications:  Continue previous medications

## 2022-12-31 NOTE — OP NOTE
"Procedure Note    Procedure Date: 12/31/2022    Procedure Performed:  Lumbar interlaminar epidural steroid injection under fluoroscopy at L3-4    Indications: Patient failed conservative therapy.      Pre-op diagnosis: Lumbar Radiculopathy    Post-op diagnosis: same    Physician: Vanessa Walters MD    Anesthesia: MAC    Medications injected: Kenalog 40mg,  2 mL sterile preservative-free normal saline.    Local anesthetic used: 1% Lidocaine, 5 ml    Estimated Blood Loss: None    Complications:  None    Technique:  The patient was interviewed in the holding area and Risks/Benefits were discussed, including, but not limited to, the possibility of new or different pain, bleeding or infection.   All questions were answered.  The patient understood and accepted risks.  Consent was verified and signed.   A time-out was taken to identify patient and procedure prior to starting the procedure. The patient was placed in the prone position on the fluoroscopy table. The area of the lumbar spine was prepped with Chloraprep and draped in a sterile manner. The L3-4  interspace was identified and marked under AP fluoroscopy. The skin and subcutaneous tissues overlying the targeted interspace were anesthetized with 3-5 mL of 1% lidocaine using a 25G 1.5" needle.  A 20G  3.5" Tuohy epidural needle was directed toward the interspace under fluoroscopic guidance until the ligamentum flavum was engaged. From this point, a loss of resistance technique with a pulsator syringe a was used to identify entrance of the needle into the epidural space. Once loss of resistance was observed 3mL of Isovue contrast solution was injected. An appropriate epidurogram was noted.  A 3mL mixture consisting of saline and 40 mg of kenalog was injected slowly and without resistance.  The needle was  removed and a sterile Band-Aid dressing was applied to the puncture site.  The patient tolerated the procedure well and was transferred to the .AC. in stable " condition.  The patient was monitored after the procedure and was given post-procedure and discharge instructions to follow at home. The patient was discharged in a stable condition and accompanied by an adult .    Epidurogram:5 mL allotment of Isovue M 300 contrast revealed excellent delineation from L3-4. There were no filling defects or obstruction to dye flow noted.  There was no  intravascular or intrathecal spread noted with dye flow.

## 2022-12-31 NOTE — DISCHARGE SUMMARY
[facility] - Pain Management  Discharge Note  Short Stay    Procedure(s) (LRB):  Injection, Steroid, Epidural, L3/4 (N/A)      OUTCOME: Patient tolerated treatment/procedure well without complication and is now ready for discharge.    DISPOSITION: Home or Self Care    FINAL DIAGNOSIS:  Lumbar radiculopathy    FOLLOWUP: In clinic    DISCHARGE INSTRUCTIONS:  See nurse's notes     TIME SPENT ON DISCHARGE: 5 minutes

## 2023-01-23 ENCOUNTER — OFFICE VISIT (OUTPATIENT)
Dept: PAIN MEDICINE | Facility: CLINIC | Age: 38
End: 2023-01-23
Payer: COMMERCIAL

## 2023-01-23 VITALS
RESPIRATION RATE: 18 BRPM | BODY MASS INDEX: 24.43 KG/M2 | WEIGHT: 152 LBS | SYSTOLIC BLOOD PRESSURE: 133 MMHG | HEART RATE: 88 BPM | DIASTOLIC BLOOD PRESSURE: 88 MMHG | HEIGHT: 66 IN

## 2023-01-23 DIAGNOSIS — M25.50 POLYARTHRALGIA: Chronic | ICD-10-CM

## 2023-01-23 DIAGNOSIS — M25.562 CHRONIC PAIN OF BOTH KNEES: Chronic | ICD-10-CM

## 2023-01-23 DIAGNOSIS — M54.16 LUMBAR RADICULOPATHY, CHRONIC: Primary | Chronic | ICD-10-CM

## 2023-01-23 DIAGNOSIS — G89.29 CHRONIC PAIN OF BOTH KNEES: Chronic | ICD-10-CM

## 2023-01-23 DIAGNOSIS — Z79.899 ENCOUNTER FOR LONG-TERM (CURRENT) DRUG USE: ICD-10-CM

## 2023-01-23 DIAGNOSIS — M25.561 CHRONIC PAIN OF BOTH KNEES: Chronic | ICD-10-CM

## 2023-01-23 LAB
CTP QC/QA: YES
POC (AMP) AMPHETAMINE: NEGATIVE
POC (BAR) BARBITURATES: NEGATIVE
POC (BUP) BUPRENORPHINE: NEGATIVE
POC (BZO) BENZODIAZEPINES: NEGATIVE
POC (COC) COCAINE: NEGATIVE
POC (MDMA) METHYLENEDIOXYMETHAMPHETAMINE 3,4: NEGATIVE
POC (MET) METHAMPHETAMINE: NEGATIVE
POC (MOP) OPIATES: NEGATIVE
POC (MTD) METHADONE: NEGATIVE
POC (OXY) OXYCODONE: NEGATIVE
POC (PCP) PHENCYCLIDINE: NEGATIVE
POC (TCA) TRICYCLIC ANTIDEPRESSANTS: NEGATIVE
POC TEMPERATURE (URINE): 90
POC THC: NEGATIVE

## 2023-01-23 PROCEDURE — 3008F BODY MASS INDEX DOCD: CPT | Mod: ,,, | Performed by: PHYSICIAN ASSISTANT

## 2023-01-23 PROCEDURE — 99214 OFFICE O/P EST MOD 30 MIN: CPT | Mod: S$PBB,,, | Performed by: PHYSICIAN ASSISTANT

## 2023-01-23 PROCEDURE — 80305 DRUG TEST PRSMV DIR OPT OBS: CPT | Mod: PBBFAC | Performed by: PHYSICIAN ASSISTANT

## 2023-01-23 PROCEDURE — 3075F PR MOST RECENT SYSTOLIC BLOOD PRESS GE 130-139MM HG: ICD-10-PCS | Mod: ,,, | Performed by: PHYSICIAN ASSISTANT

## 2023-01-23 PROCEDURE — 3075F SYST BP GE 130 - 139MM HG: CPT | Mod: ,,, | Performed by: PHYSICIAN ASSISTANT

## 2023-01-23 PROCEDURE — 3079F DIAST BP 80-89 MM HG: CPT | Mod: ,,, | Performed by: PHYSICIAN ASSISTANT

## 2023-01-23 PROCEDURE — 99214 PR OFFICE/OUTPT VISIT, EST, LEVL IV, 30-39 MIN: ICD-10-PCS | Mod: S$PBB,,, | Performed by: PHYSICIAN ASSISTANT

## 2023-01-23 PROCEDURE — 1159F MED LIST DOCD IN RCRD: CPT | Mod: ,,, | Performed by: PHYSICIAN ASSISTANT

## 2023-01-23 PROCEDURE — 3008F PR BODY MASS INDEX (BMI) DOCUMENTED: ICD-10-PCS | Mod: ,,, | Performed by: PHYSICIAN ASSISTANT

## 2023-01-23 PROCEDURE — 99214 OFFICE O/P EST MOD 30 MIN: CPT | Mod: PBBFAC | Performed by: PHYSICIAN ASSISTANT

## 2023-01-23 PROCEDURE — 1159F PR MEDICATION LIST DOCUMENTED IN MEDICAL RECORD: ICD-10-PCS | Mod: ,,, | Performed by: PHYSICIAN ASSISTANT

## 2023-01-23 PROCEDURE — 3079F PR MOST RECENT DIASTOLIC BLOOD PRESSURE 80-89 MM HG: ICD-10-PCS | Mod: ,,, | Performed by: PHYSICIAN ASSISTANT

## 2023-01-23 RX ORDER — ACETAMINOPHEN AND CODEINE PHOSPHATE 300; 30 MG/1; MG/1
1 TABLET ORAL DAILY PRN
Qty: 30 TABLET | Refills: 0 | Status: SHIPPED | OUTPATIENT
Start: 2023-01-23 | End: 2023-02-22 | Stop reason: SDUPTHER

## 2023-01-23 RX ORDER — TRAMADOL HYDROCHLORIDE 100 MG/1
100 TABLET, EXTENDED RELEASE ORAL DAILY
COMMUNITY
End: 2023-05-23

## 2023-01-23 RX ORDER — TRAZODONE HYDROCHLORIDE 100 MG/1
100 TABLET ORAL NIGHTLY
COMMUNITY
End: 2023-11-03

## 2023-01-23 NOTE — PROGRESS NOTES
Subjective:         Patient ID: Keshia Shepherd is a 37 y.o. female.    Chief Complaint: Low-back Pain        Pain  This is a chronic problem. The current episode started more than 1 year ago. The problem occurs daily. The problem has been gradually improving. Associated symptoms include arthralgias, myalgias and neck pain. Pertinent negatives include no anorexia, change in bowel habit, chest pain, chills, coughing, diaphoresis, fever, sore throat, vertigo or vomiting.   Review of Systems   Constitutional:  Negative for activity change, appetite change, chills, diaphoresis, fever and unexpected weight change.   HENT:  Negative for drooling, ear discharge, ear pain, facial swelling, nosebleeds, sore throat, trouble swallowing, voice change and goiter.    Eyes:  Negative for photophobia, pain, discharge, redness and visual disturbance.   Respiratory:  Negative for apnea, cough, choking, chest tightness, shortness of breath, wheezing and stridor.    Cardiovascular:  Negative for chest pain, palpitations and leg swelling.   Gastrointestinal:  Negative for abdominal distention, anorexia, change in bowel habit, diarrhea, rectal pain, vomiting, fecal incontinence and change in bowel habit.   Endocrine: Negative for cold intolerance, heat intolerance, polydipsia, polyphagia and polyuria.   Genitourinary:  Negative for bladder incontinence, dysuria, flank pain, frequency, hematuria and hot flashes.   Musculoskeletal:  Positive for arthralgias, back pain, leg pain, myalgias, neck pain and neck stiffness. Negative for gait problem and joint deformity.   Integumentary:  Negative for color change and pallor.   Allergic/Immunologic: Negative for immunocompromised state.   Neurological:  Negative for dizziness, vertigo, seizures, syncope, facial asymmetry, speech difficulty, light-headedness, coordination difficulties, memory loss and coordination difficulties.   Hematological:  Negative for adenopathy. Does not bruise/bleed  "easily.   Psychiatric/Behavioral:  Negative for agitation, behavioral problems, confusion, decreased concentration, dysphoric mood, hallucinations, self-injury and suicidal ideas. The patient is not nervous/anxious and is not hyperactive.          Past Medical History:   Diagnosis Date    Asthma     Cervical radiculopathy     Chronic pain syndrome     Depressive disorder     Fibromyalgia     GERD (gastroesophageal reflux disease)     Hyperlipidemia     Osteoarthritis     Palpitations     Radiculopathy of cervical region      Past Surgical History:   Procedure Laterality Date    CARPAL TUNNEL RELEASE Bilateral     EPIDURAL STEROID INJECTION N/A 12/29/2022    Procedure: Injection, Steroid, Epidural, L3/4;  Surgeon: Vanessa Walters MD;  Location: [facility];  Service: Pain Management;  Laterality: N/A;    HYSTERECTOMY       Social History     Socioeconomic History    Marital status: Single   Tobacco Use    Smoking status: Never    Smokeless tobacco: Never   Substance and Sexual Activity    Alcohol use: Never    Drug use: Not Currently     Family History   Problem Relation Age of Onset    Arthritis Mother     Cancer Mother     Depression Mother     Diabetes Mother     Heart disease Mother     Hypertension Mother     Stroke Mother     Asthma Daughter     Arthritis Maternal Grandmother     Cancer Maternal Grandmother     Depression Maternal Grandmother     Diabetes Maternal Grandmother     Hypertension Maternal Grandmother     Heart disease Maternal Grandfather     Hypertension Maternal Grandfather     Cancer Paternal Grandmother     Depression Paternal Grandmother     Diabetes Paternal Grandmother      Review of patient's allergies indicates:   Allergen Reactions    Opioids - morphine analogues     Pcn [penicillins]         Objective:  Vitals:    01/23/23 1433   BP: 133/88   Pulse: 88   Resp: 18   Weight: 68.9 kg (152 lb)   Height: 5' 6" (1.676 m)   PainSc:   8         Physical Exam  Vitals and nursing note " reviewed. Exam conducted with a chaperone present.   Constitutional:       General: She is awake. She is not in acute distress.     Appearance: Normal appearance. She is not ill-appearing, toxic-appearing or diaphoretic.   HENT:      Head: Normocephalic and atraumatic.      Nose: Nose normal.      Mouth/Throat:      Mouth: Mucous membranes are moist.      Pharynx: Oropharynx is clear.   Eyes:      Conjunctiva/sclera: Conjunctivae normal.      Pupils: Pupils are equal, round, and reactive to light.   Cardiovascular:      Rate and Rhythm: Normal rate.   Pulmonary:      Effort: Pulmonary effort is normal. No respiratory distress.   Abdominal:      Palpations: Abdomen is soft.   Musculoskeletal:         General: No deformity or signs of injury. Normal range of motion.      Cervical back: Normal range of motion and neck supple.   Skin:     General: Skin is warm and dry.   Neurological:      General: No focal deficit present.      Mental Status: She is alert and oriented to person, place, and time. Mental status is at baseline.      Cranial Nerves: No cranial nerve deficit (II-XII).   Psychiatric:         Mood and Affect: Mood normal.         Behavior: Behavior normal. Behavior is cooperative.         Thought Content: Thought content normal.         FL Fluoro for Pain Management  See OP Notes for results.     IMPRESSION: See OP Notes for results.     This procedure was auto-finalized by: Virtual [employer]         Lab Visit on 11/30/2022   Component Date Value Ref Range Status    YOHAN Screen 11/30/2022 Negative  Negative Final    C3 11/30/2022 142  90 - 180 mg/dL Final    C4 11/30/2022 36  10 - 40 mg/dL Final    Complement, Total 11/30/2022 60  30 - 75 U/mL Final    CCP 11/30/2022 <16.0  <=19.9 units Final    Sodium 11/30/2022 139  136 - 145 mmol/L Final    Potassium 11/30/2022 4.1  3.5 - 5.1 mmol/L Final    Chloride 11/30/2022 105  98 - 107 mmol/L Final    CO2 11/30/2022 29  21 - 32 mmol/L Final    Anion Gap 11/30/2022 9   7 - 16 mmol/L Final    Glucose 11/30/2022 86  74 - 106 mg/dL Final    BUN 11/30/2022 9  7 - 18 mg/dL Final    Creatinine 11/30/2022 0.76  0.55 - 1.02 mg/dL Final    BUN/Creatinine Ratio 11/30/2022 12  6 - 20 Final    Calcium 11/30/2022 9.9  8.5 - 10.1 mg/dL Final    Total Protein 11/30/2022 8.0  6.4 - 8.2 g/dL Final    Albumin 11/30/2022 4.2  3.5 - 5.0 g/dL Final    Globulin 11/30/2022 3.8  2.0 - 4.0 g/dL Final    A/G Ratio 11/30/2022 1.1   Final    Bilirubin, Total 11/30/2022 0.3  >0.0 - 1.2 mg/dL Final    Alk Phos 11/30/2022 125 (H)  37 - 98 U/L Final    ALT 11/30/2022 24  13 - 56 U/L Final    AST 11/30/2022 12 (L)  15 - 37 U/L Final    eGFR 11/30/2022 104  >=60 mL/min/1.73m² Final    RA Titer 11/30/2022 <10  0 - 15 IU/mL Final    CRP 11/30/2022 <0.29  0.00 - 0.80 mg/dL Final    ESR Westergren 11/30/2022 9  0 - 20 mm/Hr Final    Syphilis Ab Interpretation 11/30/2022 Non-Reactive  Non-Reactive Final    Free T4 11/30/2022 0.94  0.76 - 1.46 ng/dL Final    TSH 11/30/2022 1.740  0.358 - 3.740 uIU/mL Final    Lupus Anticoagulant Tech Interp 11/30/2022 SEE COMMENTS   Final    Prothrombin Time (PT), P 11/30/2022 11.9  9.4 - 12.5 sec Final    INR 11/30/2022 1.1  0.9 - 1.1 Final    Activated Partial Thrombopl Time, P 11/30/2022 36  25 - 37 sec Final    DRVVT Screen Ratio 11/30/2022 0.92  <1.20 ratio Final    Vitamin D 25-Hydroxy, Blood 11/30/2022 47.2  ng/mL Final    Hepatitis C Ab 11/30/2022 Non-Reactive  Non-Reactive Final    Uric Acid 11/30/2022 3.3  2.6 - 6.0 mg/dL Final    Antistreptolysin O (ASO) 11/30/2022 76.0  0.0 - 408.0 IU/mL Final    Anti-dsDNA 11/30/2022 Negative  Negative Final    Anti-DSDNA (IU) 11/30/2022 3  0 - 24 IU Final    West Nile Virus Ab, IgG 11/30/2022 Negative  Negative Final    West Nile Virus Ab, IgM 11/30/2022 Negative  Negative Final    West Nile Serum Interpretation 11/30/2022 SEE COMMENTS   Final    Anaplasma phagocytophilum Ab, IgG,S 11/30/2022 <1:64  <1:64 titer Final    Ehrlichia  Chaffeensis (HME) Ab, IgG 11/30/2022 <1:64  <1:64 titer Final    Spotted Fever Group Ab, IgG, S 11/30/2022 <1:64  <1:64 Final    Spotted Fever Group Ab, IgM, S 11/30/2022 <1:64  <1:64 Final    Lyme IgG/IgM 11/30/2022 Non-Reactive  Non-Reactive Final    CK 11/30/2022 114  26 - 192 U/L Final    Nil Result, TB 11/30/2022 0.05   Final    TB1 Ag minus Nil Result 11/30/2022 0.11   Final    TB2 Ag minus Nil Result 11/30/2022 0.07   Final    Mitogen minus Nil Result, TB 11/30/2022 >10.00   Final    QuantiFERON-Tb Gold Plus 11/30/2022 Negative  Negative Final    WBC 11/30/2022 3.43 (L)  4.50 - 11.00 K/uL Final    RBC 11/30/2022 5.04  4.20 - 5.40 M/uL Final    Hemoglobin 11/30/2022 11.7 (L)  12.0 - 16.0 g/dL Final    Hematocrit 11/30/2022 38.4  38.0 - 47.0 % Final    MCV 11/30/2022 76.2 (L)  80.0 - 96.0 fL Final    MCH 11/30/2022 23.2 (L)  27.0 - 31.0 pg Final    MCHC 11/30/2022 30.5 (L)  32.0 - 36.0 g/dL Final    RDW 11/30/2022 14.1  11.5 - 14.5 % Final    Platelet Count 11/30/2022 365  150 - 400 K/uL Final    MPV 11/30/2022 9.4  9.4 - 12.4 fL Final    Neutrophils % 11/30/2022 46.1 (L)  53.0 - 65.0 % Final    Lymphocytes % 11/30/2022 46.9 (H)  27.0 - 41.0 % Final    Monocytes % 11/30/2022 5.8  2.0 - 6.0 % Final    Eosinophils % 11/30/2022 0.9 (L)  1.0 - 4.0 % Final    Basophils % 11/30/2022 0.3  0.0 - 1.0 % Final    Immature Granulocytes % 11/30/2022 0.0  0.0 - 0.4 % Final    nRBC, Auto 11/30/2022 0.0  <=0.0 % Final    Neutrophils, Abs 11/30/2022 1.58 (L)  1.80 - 7.70 K/uL Final    Lymphocytes, Absolute 11/30/2022 1.61  1.00 - 4.80 K/uL Final    Monocytes, Absolute 11/30/2022 0.20  0.00 - 0.80 K/uL Final    Eosinophils, Absolute 11/30/2022 0.03  0.00 - 0.50 K/uL Final    Basophils, Absolute 11/30/2022 0.01  0.00 - 0.20 K/uL Final    Immature Granulocytes, Absolute 11/30/2022 0.00  0.00 - 0.04 K/uL Final    nRBC, Absolute 11/30/2022 0.00  <=0.00 x10e3/uL Final    Diff Type 11/30/2022 Auto   Final    HLA-B27 Result 11/30/2022  Negative  Not Applicable Final    Interpretation 11/30/2022 SEE COMMENTS   Final         Orders Placed This Encounter   Procedures    POCT Urine Drug Screen Presump     Interpretive Information:     Negative:  No drug detected at the cut off level.   Positive:  This result represents presumptive positive for the   tested drug, other substances may yield a positive response other   than the analyte of interest. This result should be utilized for   diagnostic purpose only. Confirmation testing will be performed upon physician request only.            Requested Prescriptions     Signed Prescriptions Disp Refills    acetaminophen-codeine 300-30mg (TYLENOL #3) 300-30 mg Tab 30 tablet 0     Sig: Take 1 tablet by mouth daily as needed (Pain).       Assessment:     1. Lumbar radiculopathy, chronic    2. Chronic pain of both knees    3. Polyarthralgia    4. Encounter for long-term (current) drug use         A's of Opioid Risk Assessment  Activity:Patient can perform ADL.   Analgesia:Patients pain is partially controlled by current medication. Patient has tried OTC medications such as Tylenol and Ibuprofen with out relief.   Adverse Effects: Patient denies constipation or sedation.  Aberrant Behavior:  reviewed with no aberrant drug seeking/taking behavior.  Overdose reversal drug naloxone discussed     Drug screen reviewed    Plan:    Narcan January 2023    Follow-up after lumbar L3/4 CARMEN # 1 December 29, 2022 she states she would 80% relief initially, maintain 50% relief with time, she states procedure did help improve her level function     She is requesting refill for Tylenol 3    She does not use this medication on a daily basis     Presumptive drug screen today     Narcotics agreement reiterated with patient    Continues complain multiple joint pain muscle pain ongoing for more than a year    MRI lumbar spine [facility] November 30, 2022 L3/4 far lateral disc bulge right greater than left multiple level  degenerative changes     X-ray bilateral knees [facility] November 30, 2022 degenerative changes no fracture noted    X-ray lumbar spine [facility] November 30, 2022 degenerative changes no fracture noted    X-ray hips [facility] November 30, 2022 degenerative changes no fracture noted    Tylenol No. 3 1 p.o. q.day as needed number 30 tablets     Follow-up 1 month drug screen    Dr. Walters December 2023

## 2023-02-22 ENCOUNTER — OFFICE VISIT (OUTPATIENT)
Dept: PAIN MEDICINE | Facility: CLINIC | Age: 38
End: 2023-02-22
Payer: COMMERCIAL

## 2023-02-22 VITALS
RESPIRATION RATE: 16 BRPM | SYSTOLIC BLOOD PRESSURE: 101 MMHG | HEART RATE: 62 BPM | DIASTOLIC BLOOD PRESSURE: 50 MMHG | WEIGHT: 152 LBS | BODY MASS INDEX: 24.43 KG/M2 | HEIGHT: 66 IN

## 2023-02-22 DIAGNOSIS — M25.50 POLYARTHRALGIA: Chronic | ICD-10-CM

## 2023-02-22 DIAGNOSIS — Z79.899 ENCOUNTER FOR LONG-TERM (CURRENT) DRUG USE: ICD-10-CM

## 2023-02-22 DIAGNOSIS — M54.2 NECK PAIN: ICD-10-CM

## 2023-02-22 DIAGNOSIS — M54.16 LUMBAR RADICULOPATHY, CHRONIC: Primary | Chronic | ICD-10-CM

## 2023-02-22 DIAGNOSIS — M25.562 CHRONIC PAIN OF BOTH KNEES: Chronic | ICD-10-CM

## 2023-02-22 DIAGNOSIS — G89.29 CHRONIC PAIN OF BOTH KNEES: Chronic | ICD-10-CM

## 2023-02-22 DIAGNOSIS — M25.561 CHRONIC PAIN OF BOTH KNEES: Chronic | ICD-10-CM

## 2023-02-22 LAB
CTP QC/QA: YES
POC (AMP) AMPHETAMINE: NEGATIVE
POC (BAR) BARBITURATES: NEGATIVE
POC (BUP) BUPRENORPHINE: NEGATIVE
POC (BZO) BENZODIAZEPINES: NEGATIVE
POC (COC) COCAINE: NEGATIVE
POC (MDMA) METHYLENEDIOXYMETHAMPHETAMINE 3,4: NEGATIVE
POC (MET) METHAMPHETAMINE: NEGATIVE
POC (MOP) OPIATES: ABNORMAL
POC (MTD) METHADONE: NEGATIVE
POC (OXY) OXYCODONE: NEGATIVE
POC (PCP) PHENCYCLIDINE: NEGATIVE
POC (TCA) TRICYCLIC ANTIDEPRESSANTS: NEGATIVE
POC TEMPERATURE (URINE): 94
POC THC: NEGATIVE

## 2023-02-22 PROCEDURE — 3008F BODY MASS INDEX DOCD: CPT | Mod: ,,, | Performed by: PHYSICIAN ASSISTANT

## 2023-02-22 PROCEDURE — 1159F MED LIST DOCD IN RCRD: CPT | Mod: ,,, | Performed by: PHYSICIAN ASSISTANT

## 2023-02-22 PROCEDURE — 3078F DIAST BP <80 MM HG: CPT | Mod: ,,, | Performed by: PHYSICIAN ASSISTANT

## 2023-02-22 PROCEDURE — 99215 OFFICE O/P EST HI 40 MIN: CPT | Mod: PBBFAC | Performed by: PHYSICIAN ASSISTANT

## 2023-02-22 PROCEDURE — 3008F PR BODY MASS INDEX (BMI) DOCUMENTED: ICD-10-PCS | Mod: ,,, | Performed by: PHYSICIAN ASSISTANT

## 2023-02-22 PROCEDURE — 3074F PR MOST RECENT SYSTOLIC BLOOD PRESSURE < 130 MM HG: ICD-10-PCS | Mod: ,,, | Performed by: PHYSICIAN ASSISTANT

## 2023-02-22 PROCEDURE — 99214 OFFICE O/P EST MOD 30 MIN: CPT | Mod: S$PBB,25,, | Performed by: PHYSICIAN ASSISTANT

## 2023-02-22 PROCEDURE — 3078F PR MOST RECENT DIASTOLIC BLOOD PRESSURE < 80 MM HG: ICD-10-PCS | Mod: ,,, | Performed by: PHYSICIAN ASSISTANT

## 2023-02-22 PROCEDURE — 80305 DRUG TEST PRSMV DIR OPT OBS: CPT | Mod: PBBFAC | Performed by: PHYSICIAN ASSISTANT

## 2023-02-22 PROCEDURE — 3074F SYST BP LT 130 MM HG: CPT | Mod: ,,, | Performed by: PHYSICIAN ASSISTANT

## 2023-02-22 PROCEDURE — 1159F PR MEDICATION LIST DOCUMENTED IN MEDICAL RECORD: ICD-10-PCS | Mod: ,,, | Performed by: PHYSICIAN ASSISTANT

## 2023-02-22 PROCEDURE — 96372 THER/PROPH/DIAG INJ SC/IM: CPT | Mod: PBBFAC | Performed by: PHYSICIAN ASSISTANT

## 2023-02-22 PROCEDURE — 99214 PR OFFICE/OUTPT VISIT, EST, LEVL IV, 30-39 MIN: ICD-10-PCS | Mod: S$PBB,25,, | Performed by: PHYSICIAN ASSISTANT

## 2023-02-22 RX ORDER — KETOROLAC TROMETHAMINE 30 MG/ML
60 INJECTION, SOLUTION INTRAMUSCULAR; INTRAVENOUS
Status: COMPLETED | OUTPATIENT
Start: 2023-02-22 | End: 2023-02-22

## 2023-02-22 RX ORDER — ACETAMINOPHEN AND CODEINE PHOSPHATE 300; 30 MG/1; MG/1
1 TABLET ORAL DAILY PRN
Qty: 30 TABLET | Refills: 1 | Status: SHIPPED | OUTPATIENT
Start: 2023-02-23 | End: 2023-04-18 | Stop reason: SDUPTHER

## 2023-02-22 RX ADMIN — KETOROLAC TROMETHAMINE 60 MG: 30 INJECTION, SOLUTION INTRAMUSCULAR at 09:02

## 2023-02-22 NOTE — PROGRESS NOTES
Subjective:         Patient ID: Keshia Shepherd is a 38 y.o. female.    Chief Complaint: Low-back Pain and Hip Pain        Pain  This is a chronic problem. The current episode started more than 1 year ago. The problem occurs daily. The problem has been waxing and waning. Associated symptoms include arthralgias, myalgias and neck pain. Pertinent negatives include no anorexia, chest pain, chills, coughing, diaphoresis, fatigue, fever, sore throat, vertigo or vomiting.   Review of Systems   Constitutional:  Negative for activity change, appetite change, chills, diaphoresis, fatigue, fever and unexpected weight change.   HENT:  Negative for drooling, ear discharge, ear pain, facial swelling, nosebleeds, sore throat, trouble swallowing, voice change and goiter.    Eyes:  Negative for photophobia, pain, discharge, redness and visual disturbance.   Respiratory:  Negative for apnea, cough, choking, chest tightness, shortness of breath, wheezing and stridor.    Cardiovascular:  Negative for chest pain, palpitations and leg swelling.   Gastrointestinal:  Negative for abdominal distention, anorexia, diarrhea, rectal pain, vomiting and fecal incontinence.   Endocrine: Negative for cold intolerance, heat intolerance, polydipsia, polyphagia and polyuria.   Genitourinary:  Negative for bladder incontinence, dysuria, flank pain, frequency, hematuria and hot flashes.   Musculoskeletal:  Positive for arthralgias, back pain, leg pain, myalgias, neck pain and neck stiffness. Negative for gait problem and joint deformity.   Integumentary:  Negative for color change and pallor.   Allergic/Immunologic: Negative for immunocompromised state.   Neurological:  Negative for dizziness, vertigo, seizures, syncope, facial asymmetry, speech difficulty, light-headedness, coordination difficulties, memory loss and coordination difficulties.   Hematological:  Negative for adenopathy. Does not bruise/bleed easily.   Psychiatric/Behavioral:  Negative  "for agitation, behavioral problems, confusion, decreased concentration, dysphoric mood, hallucinations, self-injury and suicidal ideas. The patient is not nervous/anxious and is not hyperactive.          Past Medical History:   Diagnosis Date    Asthma     Cervical radiculopathy     Chronic pain syndrome     Depressive disorder     Fibromyalgia     GERD (gastroesophageal reflux disease)     Hyperlipidemia     Osteoarthritis     Palpitations     Radiculopathy of cervical region      Past Surgical History:   Procedure Laterality Date    CARPAL TUNNEL RELEASE Bilateral     EPIDURAL STEROID INJECTION N/A 12/29/2022    Procedure: Injection, Steroid, Epidural, L3/4;  Surgeon: Vansesa Walters MD;  Location: [facility];  Service: Pain Management;  Laterality: N/A;    HYSTERECTOMY       Social History     Socioeconomic History    Marital status: Single   Tobacco Use    Smoking status: Never    Smokeless tobacco: Never   Substance and Sexual Activity    Alcohol use: Never    Drug use: Not Currently     Family History   Problem Relation Age of Onset    Arthritis Mother     Cancer Mother     Depression Mother     Diabetes Mother     Heart disease Mother     Hypertension Mother     Stroke Mother     Asthma Daughter     Arthritis Maternal Grandmother     Cancer Maternal Grandmother     Depression Maternal Grandmother     Diabetes Maternal Grandmother     Hypertension Maternal Grandmother     Heart disease Maternal Grandfather     Hypertension Maternal Grandfather     Cancer Paternal Grandmother     Depression Paternal Grandmother     Diabetes Paternal Grandmother      Review of patient's allergies indicates:   Allergen Reactions    Opioids - morphine analogues     Pcn [penicillins]         Objective:  Vitals:    02/22/23 0833   BP: (!) 101/50   Pulse: 62   Resp: 16   Weight: 68.9 kg (152 lb)   Height: 5' 6" (1.676 m)   PainSc:   8         Physical Exam  Vitals and nursing note reviewed. Exam conducted with a chaperone " present.   Constitutional:       General: She is awake.      Appearance: Normal appearance. She is not ill-appearing, toxic-appearing or diaphoretic.   HENT:      Head: Normocephalic and atraumatic.      Nose: Nose normal.      Mouth/Throat:      Mouth: Mucous membranes are moist.      Pharynx: Oropharynx is clear.   Eyes:      Conjunctiva/sclera: Conjunctivae normal.      Pupils: Pupils are equal, round, and reactive to light.   Cardiovascular:      Rate and Rhythm: Normal rate.   Pulmonary:      Effort: Pulmonary effort is normal. No respiratory distress.   Abdominal:      Palpations: Abdomen is soft.   Musculoskeletal:         General: No deformity or signs of injury. Normal range of motion.      Cervical back: Normal range of motion and neck supple.   Skin:     General: Skin is warm and dry.   Neurological:      General: No focal deficit present.      Mental Status: She is alert and oriented to person, place, and time. Mental status is at baseline.      Cranial Nerves: No cranial nerve deficit (II-XII).   Psychiatric:         Mood and Affect: Mood normal.         Behavior: Behavior normal. Behavior is cooperative.         Thought Content: Thought content normal.         [facility] Fluoro for Pain Management  See OP Notes for results.     IMPRESSION: See OP Notes for results.     This procedure was auto-finalized by: Virtual Radiologist         Office Visit on 01/23/2023   Component Date Value Ref Range Status    POC Amphetamines 01/23/2023 Negative  Negative, Inconclusive Final    POC Barbiturates 01/23/2023 Negative  Negative, Inconclusive Final    POC Benzodiazepines 01/23/2023 Negative  Negative, Inconclusive Final    POC Cocaine 01/23/2023 Negative  Negative, Inconclusive Final    POC THC 01/23/2023 Negative  Negative, Inconclusive Final    POC Methadone 01/23/2023 Negative  Negative, Inconclusive Final    POC Methamphetamine 01/23/2023 Negative  Negative, Inconclusive Final    POC Opiates 01/23/2023 Negative   Negative, Inconclusive Final    POC Oxycodone 01/23/2023 Negative  Negative, Inconclusive Final    POC Phencyclidine 01/23/2023 Negative  Negative, Inconclusive Final    POC Methylenedioxymethamphetamine * 01/23/2023 Negative  Negative, Inconclusive Final    POC Tricyclic Antidepressants 01/23/2023 Negative  Negative, Inconclusive Final    POC Buprenorphine 01/23/2023 Negative   Final     Acceptable 01/23/2023 Yes   Final    POC Temperature (Urine) 01/23/2023 90   Final   Lab Visit on 11/30/2022   Component Date Value Ref Range Status    YOHAN Screen 11/30/2022 Negative  Negative Final    C3 11/30/2022 142  90 - 180 mg/dL Final    C4 11/30/2022 36  10 - 40 mg/dL Final    Complement, Total 11/30/2022 60  30 - 75 U/mL Final    CCP 11/30/2022 <16.0  <=19.9 units Final    Sodium 11/30/2022 139  136 - 145 mmol/L Final    Potassium 11/30/2022 4.1  3.5 - 5.1 mmol/L Final    Chloride 11/30/2022 105  98 - 107 mmol/L Final    CO2 11/30/2022 29  21 - 32 mmol/L Final    Anion Gap 11/30/2022 9  7 - 16 mmol/L Final    Glucose 11/30/2022 86  74 - 106 mg/dL Final    BUN 11/30/2022 9  7 - 18 mg/dL Final    Creatinine 11/30/2022 0.76  0.55 - 1.02 mg/dL Final    BUN/Creatinine Ratio 11/30/2022 12  6 - 20 Final    Calcium 11/30/2022 9.9  8.5 - 10.1 mg/dL Final    Total Protein 11/30/2022 8.0  6.4 - 8.2 g/dL Final    Albumin 11/30/2022 4.2  3.5 - 5.0 g/dL Final    Globulin 11/30/2022 3.8  2.0 - 4.0 g/dL Final    A/G Ratio 11/30/2022 1.1   Final    Bilirubin, Total 11/30/2022 0.3  >0.0 - 1.2 mg/dL Final    Alk Phos 11/30/2022 125 (H)  37 - 98 U/L Final    ALT 11/30/2022 24  13 - 56 U/L Final    AST 11/30/2022 12 (L)  15 - 37 U/L Final    eGFR 11/30/2022 104  >=60 mL/min/1.73m² Final    RA Titer 11/30/2022 <10  0 - 15 IU/mL Final    CRP 11/30/2022 <0.29  0.00 - 0.80 mg/dL Final    ESR Westergren 11/30/2022 9  0 - 20 mm/Hr Final    Syphilis Ab Interpretation 11/30/2022 Non-Reactive  Non-Reactive Final    Free T4 11/30/2022  0.94  0.76 - 1.46 ng/dL Final    TSH 11/30/2022 1.740  0.358 - 3.740 uIU/mL Final    Lupus Anticoagulant Tech Interp 11/30/2022 SEE COMMENTS   Final    Prothrombin Time (PT), P 11/30/2022 11.9  9.4 - 12.5 sec Final    INR 11/30/2022 1.1  0.9 - 1.1 Final    Activated Partial Thrombopl Time, P 11/30/2022 36  25 - 37 sec Final    DRVVT Screen Ratio 11/30/2022 0.92  <1.20 ratio Final    Vitamin D 25-Hydroxy, Blood 11/30/2022 47.2  ng/mL Final    Hepatitis C Ab 11/30/2022 Non-Reactive  Non-Reactive Final    Uric Acid 11/30/2022 3.3  2.6 - 6.0 mg/dL Final    Antistreptolysin O (ASO) 11/30/2022 76.0  0.0 - 408.0 IU/mL Final    Anti-dsDNA 11/30/2022 Negative  Negative Final    Anti-DSDNA (IU) 11/30/2022 3  0 - 24 IU Final    West Nile Virus Ab, IgG 11/30/2022 Negative  Negative Final    West Nile Virus Ab, IgM 11/30/2022 Negative  Negative Final    West Nile Serum Interpretation 11/30/2022 SEE COMMENTS   Final    Anaplasma phagocytophilum Ab, IgG,S 11/30/2022 <1:64  <1:64 titer Final    Ehrlichia Chaffeensis (HME) Ab, IgG 11/30/2022 <1:64  <1:64 titer Final    Spotted Fever Group Ab, IgG, S 11/30/2022 <1:64  <1:64 Final    Spotted Fever Group Ab, IgM, S 11/30/2022 <1:64  <1:64 Final    Lyme IgG/IgM 11/30/2022 Non-Reactive  Non-Reactive Final    CK 11/30/2022 114  26 - 192 U/L Final    Nil Result, TB 11/30/2022 0.05   Final    TB1 Ag minus Nil Result 11/30/2022 0.11   Final    TB2 Ag minus Nil Result 11/30/2022 0.07   Final    Mitogen minus Nil Result, TB 11/30/2022 >10.00   Final    QuantiFERON-Tb Gold Plus 11/30/2022 Negative  Negative Final    WBC 11/30/2022 3.43 (L)  4.50 - 11.00 K/uL Final    RBC 11/30/2022 5.04  4.20 - 5.40 M/uL Final    Hemoglobin 11/30/2022 11.7 (L)  12.0 - 16.0 g/dL Final    Hematocrit 11/30/2022 38.4  38.0 - 47.0 % Final    MCV 11/30/2022 76.2 (L)  80.0 - 96.0 fL Final    MCH 11/30/2022 23.2 (L)  27.0 - 31.0 pg Final    MCHC 11/30/2022 30.5 (L)  32.0 - 36.0 g/dL Final    RDW 11/30/2022 14.1  11.5 -  14.5 % Final    Platelet Count 11/30/2022 365  150 - 400 K/uL Final    MPV 11/30/2022 9.4  9.4 - 12.4 fL Final    Neutrophils % 11/30/2022 46.1 (L)  53.0 - 65.0 % Final    Lymphocytes % 11/30/2022 46.9 (H)  27.0 - 41.0 % Final    Monocytes % 11/30/2022 5.8  2.0 - 6.0 % Final    Eosinophils % 11/30/2022 0.9 (L)  1.0 - 4.0 % Final    Basophils % 11/30/2022 0.3  0.0 - 1.0 % Final    Immature Granulocytes % 11/30/2022 0.0  0.0 - 0.4 % Final    nRBC, Auto 11/30/2022 0.0  <=0.0 % Final    Neutrophils, Abs 11/30/2022 1.58 (L)  1.80 - 7.70 K/uL Final    Lymphocytes, Absolute 11/30/2022 1.61  1.00 - 4.80 K/uL Final    Monocytes, Absolute 11/30/2022 0.20  0.00 - 0.80 K/uL Final    Eosinophils, Absolute 11/30/2022 0.03  0.00 - 0.50 K/uL Final    Basophils, Absolute 11/30/2022 0.01  0.00 - 0.20 K/uL Final    Immature Granulocytes, Absolute 11/30/2022 0.00  0.00 - 0.04 K/uL Final    nRBC, Absolute 11/30/2022 0.00  <=0.00 x10e3/uL Final    Diff Type 11/30/2022 Auto   Final    HLA-B27 Result 11/30/2022 Negative  Not Applicable Final    Interpretation 11/30/2022 SEE COMMENTS   Final         Orders Placed This Encounter   Procedures    POCT Urine Drug Screen Presump     Interpretive Information:     Negative:  No drug detected at the cut off level.   Positive:  This result represents presumptive positive for the   tested drug, other substances may yield a positive response other   than the analyte of interest. This result should be utilized for   diagnostic purpose only. Confirmation testing will be performed upon physician request only.            Requested Prescriptions      No prescriptions requested or ordered in this encounter       Assessment:     1. Encounter for long-term (current) drug use         A's of Opioid Risk Assessment  Activity:Patient can perform ADL.   Analgesia:Patients pain is partially controlled by current medication. Patient has tried OTC medications such as Tylenol and Ibuprofen with out relief.   Adverse  Effects: Patient denies constipation or sedation.  Aberrant Behavior:  reviewed with no aberrant drug seeking/taking behavior.  Overdose reversal drug naloxone discussed     Drug screen reviewed    Plan:    Narcan January 2023    She had lumbar L3/4 CARMEN # 1 December 29, 2022 she states she would 80% relief initially, maintain 50% relief with time, she states procedure did help improve her level function     Complaint of back pain joint pain neck pain knee pain     Requesting Toradol injection     Toradol 60 mg IM    Requesting resume physical therapy     Prescription sent to Physical therapy Department    MRI lumbar spine [facility] November 30, 2022 L3/4 far lateral disc bulge right greater than left multiple level degenerative changes     X-ray bilateral knees [facility] November 30, 2022 degenerative changes no fracture noted    X-ray lumbar spine [facility] November 30, 2022 degenerative changes no fracture noted    X-ray hips [facility] November 30, 2022 degenerative changes no fracture noted    Follow-up 2 months    Dr. Walters December 2023

## 2023-04-18 NOTE — PROGRESS NOTES
Subjective:         Patient ID: Keshia Shepherd is a 38 y.o. female.    Chief Complaint: Back Pain and Knee Pain        Pain  This is a chronic problem. The current episode started more than 1 year ago. The problem occurs daily. The problem has been unchanged. Associated symptoms include arthralgias, myalgias and neck pain. Pertinent negatives include no anorexia, chest pain, chills, coughing, diaphoresis, fatigue, fever, sore throat, vertigo or vomiting.   Review of Systems   Constitutional:  Negative for activity change, appetite change, chills, diaphoresis, fatigue, fever and unexpected weight change.   HENT:  Negative for drooling, ear discharge, ear pain, facial swelling, nosebleeds, sore throat, trouble swallowing, voice change and goiter.    Eyes:  Negative for photophobia, pain, discharge, redness and visual disturbance.   Respiratory:  Negative for apnea, cough, choking, chest tightness, shortness of breath, wheezing and stridor.    Cardiovascular:  Negative for chest pain, palpitations and leg swelling.   Gastrointestinal:  Negative for abdominal distention, anorexia, diarrhea, rectal pain, vomiting and fecal incontinence.   Endocrine: Negative for cold intolerance, heat intolerance, polydipsia, polyphagia and polyuria.   Genitourinary:  Negative for bladder incontinence, dysuria, flank pain, frequency, hematuria and hot flashes.   Musculoskeletal:  Positive for arthralgias, back pain, leg pain, myalgias, neck pain and neck stiffness. Negative for gait problem and joint deformity.   Integumentary:  Negative for color change and pallor.   Allergic/Immunologic: Negative for immunocompromised state.   Neurological:  Negative for dizziness, vertigo, seizures, syncope, facial asymmetry, speech difficulty, light-headedness, coordination difficulties, memory loss and coordination difficulties.   Hematological:  Negative for adenopathy. Does not bruise/bleed easily.   Psychiatric/Behavioral:  Negative for  "agitation, behavioral problems, confusion, decreased concentration, dysphoric mood, hallucinations, self-injury and suicidal ideas. The patient is not nervous/anxious and is not hyperactive.          Past Medical History:   Diagnosis Date    Asthma     Cervical radiculopathy     Chronic pain syndrome     Depressive disorder     Fibromyalgia     GERD (gastroesophageal reflux disease)     Hyperlipidemia     Osteoarthritis     Palpitations     Radiculopathy of cervical region      Past Surgical History:   Procedure Laterality Date    CARPAL TUNNEL RELEASE Bilateral     EPIDURAL STEROID INJECTION N/A 12/29/2022    Procedure: Injection, Steroid, Epidural, L3/4;  Surgeon: Vanessa Walters MD;  Location: [facility];  Service: Pain Management;  Laterality: N/A;    HYSTERECTOMY       Social History     Socioeconomic History    Marital status: Single   Tobacco Use    Smoking status: Never    Smokeless tobacco: Never   Substance and Sexual Activity    Alcohol use: Never    Drug use: Not Currently     Family History   Problem Relation Age of Onset    Arthritis Mother     Cancer Mother     Depression Mother     Diabetes Mother     Heart disease Mother     Hypertension Mother     Stroke Mother     Asthma Daughter     Arthritis Maternal Grandmother     Cancer Maternal Grandmother     Depression Maternal Grandmother     Diabetes Maternal Grandmother     Hypertension Maternal Grandmother     Heart disease Maternal Grandfather     Hypertension Maternal Grandfather     Cancer Paternal Grandmother     Depression Paternal Grandmother     Diabetes Paternal Grandmother      Review of patient's allergies indicates:   Allergen Reactions    Opioids - morphine analogues     Pcn [penicillins]         Objective:  Vitals:    04/19/23 0854   BP: 121/76   Pulse: 80   Resp: 17   SpO2: 99%   Weight: 68.9 kg (152 lb)   Height: 5' 6" (1.676 m)   PainSc:   7         Physical Exam  Vitals and nursing note reviewed. Exam conducted with a " chaperone present.   Constitutional:       General: She is awake. She is not in acute distress.     Appearance: Normal appearance. She is not ill-appearing, toxic-appearing or diaphoretic.   HENT:      Head: Normocephalic and atraumatic.      Nose: Nose normal.      Mouth/Throat:      Mouth: Mucous membranes are moist.      Pharynx: Oropharynx is clear.   Eyes:      Conjunctiva/sclera: Conjunctivae normal.      Pupils: Pupils are equal, round, and reactive to light.   Cardiovascular:      Rate and Rhythm: Normal rate.   Pulmonary:      Effort: Pulmonary effort is normal. No respiratory distress.   Abdominal:      Palpations: Abdomen is soft.   Musculoskeletal:         General: No deformity or signs of injury. Normal range of motion.      Cervical back: Normal range of motion and neck supple.   Skin:     General: Skin is warm and dry.   Neurological:      General: No focal deficit present.      Mental Status: She is alert and oriented to person, place, and time. Mental status is at baseline.      Cranial Nerves: No cranial nerve deficit (II-XII).   Psychiatric:         Mood and Affect: Mood normal.         Behavior: Behavior normal. Behavior is cooperative.         Thought Content: Thought content normal.         FL Fluoro for Pain Management  See OP Notes for results.     IMPRESSION: See OP Notes for results.     This procedure was auto-finalized by: Virtual Radiologist         Office Visit on 02/22/2023   Component Date Value Ref Range Status    POC Amphetamines 02/22/2023 Negative  Negative, Inconclusive Final    POC Barbiturates 02/22/2023 Negative  Negative, Inconclusive Final    POC Benzodiazepines 02/22/2023 Negative  Negative, Inconclusive Final    POC Cocaine 02/22/2023 Negative  Negative, Inconclusive Final    POC THC 02/22/2023 Negative  Negative, Inconclusive Final    POC Methadone 02/22/2023 Negative  Negative, Inconclusive Final    POC Methamphetamine 02/22/2023 Negative  Negative, Inconclusive Final     POC Opiates 02/22/2023 Presumptive Positive (A)  Negative, Inconclusive Final    POC Oxycodone 02/22/2023 Negative  Negative, Inconclusive Final    POC Phencyclidine 02/22/2023 Negative  Negative, Inconclusive Final    POC Methylenedioxymethamphetamine * 02/22/2023 Negative  Negative, Inconclusive Final    POC Tricyclic Antidepressants 02/22/2023 Negative  Negative, Inconclusive Final    POC Buprenorphine 02/22/2023 Negative   Final     Acceptable 02/22/2023 Yes   Final    POC Temperature (Urine) 02/22/2023 94   Final   Office Visit on 01/23/2023   Component Date Value Ref Range Status    POC Amphetamines 01/23/2023 Negative  Negative, Inconclusive Final    POC Barbiturates 01/23/2023 Negative  Negative, Inconclusive Final    POC Benzodiazepines 01/23/2023 Negative  Negative, Inconclusive Final    POC Cocaine 01/23/2023 Negative  Negative, Inconclusive Final    POC THC 01/23/2023 Negative  Negative, Inconclusive Final    POC Methadone 01/23/2023 Negative  Negative, Inconclusive Final    POC Methamphetamine 01/23/2023 Negative  Negative, Inconclusive Final    POC Opiates 01/23/2023 Negative  Negative, Inconclusive Final    POC Oxycodone 01/23/2023 Negative  Negative, Inconclusive Final    POC Phencyclidine 01/23/2023 Negative  Negative, Inconclusive Final    POC Methylenedioxymethamphetamine * 01/23/2023 Negative  Negative, Inconclusive Final    POC Tricyclic Antidepressants 01/23/2023 Negative  Negative, Inconclusive Final    POC Buprenorphine 01/23/2023 Negative   Final     Acceptable 01/23/2023 Yes   Final    POC Temperature (Urine) 01/23/2023 90   Final   Lab Visit on 11/30/2022   Component Date Value Ref Range Status    YOHAN Screen 11/30/2022 Negative  Negative Final    C3 11/30/2022 142  90 - 180 mg/dL Final    C4 11/30/2022 36  10 - 40 mg/dL Final    Complement, Total 11/30/2022 60  30 - 75 U/mL Final    CCP 11/30/2022 <16.0  <=19.9 units Final    Sodium 11/30/2022 139  136 - 145  mmol/L Final    Potassium 11/30/2022 4.1  3.5 - 5.1 mmol/L Final    Chloride 11/30/2022 105  98 - 107 mmol/L Final    CO2 11/30/2022 29  21 - 32 mmol/L Final    Anion Gap 11/30/2022 9  7 - 16 mmol/L Final    Glucose 11/30/2022 86  74 - 106 mg/dL Final    BUN 11/30/2022 9  7 - 18 mg/dL Final    Creatinine 11/30/2022 0.76  0.55 - 1.02 mg/dL Final    BUN/Creatinine Ratio 11/30/2022 12  6 - 20 Final    Calcium 11/30/2022 9.9  8.5 - 10.1 mg/dL Final    Total Protein 11/30/2022 8.0  6.4 - 8.2 g/dL Final    Albumin 11/30/2022 4.2  3.5 - 5.0 g/dL Final    Globulin 11/30/2022 3.8  2.0 - 4.0 g/dL Final    A/G Ratio 11/30/2022 1.1   Final    Bilirubin, Total 11/30/2022 0.3  >0.0 - 1.2 mg/dL Final    Alk Phos 11/30/2022 125 (H)  37 - 98 U/L Final    ALT 11/30/2022 24  13 - 56 U/L Final    AST 11/30/2022 12 (L)  15 - 37 U/L Final    eGFR 11/30/2022 104  >=60 mL/min/1.73m² Final    RA Titer 11/30/2022 <10  0 - 15 IU/mL Final    CRP 11/30/2022 <0.29  0.00 - 0.80 mg/dL Final    ESR Westergren 11/30/2022 9  0 - 20 mm/Hr Final    Syphilis Ab Interpretation 11/30/2022 Non-Reactive  Non-Reactive Final    Free T4 11/30/2022 0.94  0.76 - 1.46 ng/dL Final    TSH 11/30/2022 1.740  0.358 - 3.740 uIU/mL Final    Lupus Anticoagulant Tech Interp 11/30/2022 SEE COMMENTS   Final    Prothrombin Time (PT), P 11/30/2022 11.9  9.4 - 12.5 sec Final    INR 11/30/2022 1.1  0.9 - 1.1 Final    Activated Partial Thrombopl Time, P 11/30/2022 36  25 - 37 sec Final    DRVVT Screen Ratio 11/30/2022 0.92  <1.20 ratio Final    Vitamin D 25-Hydroxy, Blood 11/30/2022 47.2  ng/mL Final    Hepatitis C Ab 11/30/2022 Non-Reactive  Non-Reactive Final    Uric Acid 11/30/2022 3.3  2.6 - 6.0 mg/dL Final    Antistreptolysin O (ASO) 11/30/2022 76.0  0.0 - 408.0 IU/mL Final    Anti-dsDNA 11/30/2022 Negative  Negative Final    Anti-DSDNA (IU) 11/30/2022 3  0 - 24 IU Final    West Nile Virus Ab, IgG 11/30/2022 Negative  Negative Final    West Nile Virus Ab, IgM 11/30/2022  Negative  Negative Final    West Nile Serum Interpretation 11/30/2022 SEE COMMENTS   Final    Anaplasma phagocytophilum Ab, IgG,S 11/30/2022 <1:64  <1:64 titer Final    Ehrlichia Chaffeensis (HME) Ab, IgG 11/30/2022 <1:64  <1:64 titer Final    Spotted Fever Group Ab, IgG, S 11/30/2022 <1:64  <1:64 Final    Spotted Fever Group Ab, IgM, S 11/30/2022 <1:64  <1:64 Final    Lyme IgG/IgM 11/30/2022 Non-Reactive  Non-Reactive Final    CK 11/30/2022 114  26 - 192 U/L Final    Nil Result, TB 11/30/2022 0.05   Final    TB1 Ag minus Nil Result 11/30/2022 0.11   Final    TB2 Ag minus Nil Result 11/30/2022 0.07   Final    Mitogen minus Nil Result, TB 11/30/2022 >10.00   Final    QuantiFERON-Tb Gold Plus 11/30/2022 Negative  Negative Final    WBC 11/30/2022 3.43 (L)  4.50 - 11.00 K/uL Final    RBC 11/30/2022 5.04  4.20 - 5.40 M/uL Final    Hemoglobin 11/30/2022 11.7 (L)  12.0 - 16.0 g/dL Final    Hematocrit 11/30/2022 38.4  38.0 - 47.0 % Final    MCV 11/30/2022 76.2 (L)  80.0 - 96.0 fL Final    MCH 11/30/2022 23.2 (L)  27.0 - 31.0 pg Final    MCHC 11/30/2022 30.5 (L)  32.0 - 36.0 g/dL Final    RDW 11/30/2022 14.1  11.5 - 14.5 % Final    Platelet Count 11/30/2022 365  150 - 400 K/uL Final    MPV 11/30/2022 9.4  9.4 - 12.4 fL Final    Neutrophils % 11/30/2022 46.1 (L)  53.0 - 65.0 % Final    Lymphocytes % 11/30/2022 46.9 (H)  27.0 - 41.0 % Final    Monocytes % 11/30/2022 5.8  2.0 - 6.0 % Final    Eosinophils % 11/30/2022 0.9 (L)  1.0 - 4.0 % Final    Basophils % 11/30/2022 0.3  0.0 - 1.0 % Final    Immature Granulocytes % 11/30/2022 0.0  0.0 - 0.4 % Final    nRBC, Auto 11/30/2022 0.0  <=0.0 % Final    Neutrophils, Abs 11/30/2022 1.58 (L)  1.80 - 7.70 K/uL Final    Lymphocytes, Absolute 11/30/2022 1.61  1.00 - 4.80 K/uL Final    Monocytes, Absolute 11/30/2022 0.20  0.00 - 0.80 K/uL Final    Eosinophils, Absolute 11/30/2022 0.03  0.00 - 0.50 K/uL Final    Basophils, Absolute 11/30/2022 0.01  0.00 - 0.20 K/uL Final    Immature  Granulocytes, Absolute 11/30/2022 0.00  0.00 - 0.04 K/uL Final    nRBC, Absolute 11/30/2022 0.00  <=0.00 x10e3/uL Final    Diff Type 11/30/2022 Auto   Final    HLA-B27 Result 11/30/2022 Negative  Not Applicable Final    Interpretation 11/30/2022 SEE COMMENTS   Final         Orders Placed This Encounter   Procedures    Case Request Operating Room: BLOCK, SACROILIAC JOINT     Order Specific Question:   Medical Necessity:     Answer:   Medically Non-Urgent [100]     Order Specific Question:   CPT Code:     Answer:   [place] INJECTION,SACROILIAC JOINT [66967]     Order Specific Question:   Is an on-site pathologist required for this procedure?     Answer:   N/A         Requested Prescriptions     Signed Prescriptions Disp Refills    acetaminophen-codeine 300-30mg (TYLENOL #3) 300-30 mg Tab 30 tablet 0     Sig: Take 1 tablet by mouth daily as needed (Pain).       Assessment:     1. Sacroiliitis    2. Lumbar radiculopathy, chronic    3. Chronic pain of both knees    4. Polyarthralgia         A's of Opioid Risk Assessment  Activity:Patient can perform ADL.   Analgesia:Patients pain is partially controlled by current medication. Patient has tried OTC medications such as Tylenol and Ibuprofen with out relief.   Adverse Effects: Patient denies constipation or sedation.  Aberrant Behavior:  reviewed with no aberrant drug seeking/taking behavior.  Overdose reversal drug naloxone discussed     Drug screen reviewed    Plan:    Narcan January 2023    She had lumbar L3/4 CARMEN # 1 December 29, 2022 she states she would 80% relief initially, maintain 50% relief with time, she states procedure did help improve her level function     Complaint of back pain pointing to her bilateral sacroiliac area she states has been ongoing for more than 3 months, pain is sacroiliac in nature    Requesting procedure    MRI lumbar spine [facility] November 30, 2022 L3/4 far lateral disc bulge right greater than left multiple level degenerative changes      X-ray bilateral knees [facility] November 30, 2022 degenerative changes no fracture noted    X-ray lumbar spine [facility] November 30, 2022 degenerative changes no fracture noted    X-ray hips [facility] November 30, 2022 degenerative changes no fracture noted    Continue home exercise program as directed     Tender bilateral sacroiliac area  Bilateral sacroiliac compression test positive  Jose's /Luis's positive bilateral  Gaenslen positive bilateral    Ongoing Physical therapy/home exercise program as directed no longer controlling discomfort    Patient's pain medication no longer helping control discomfort    Schedule bilateral sacroiliac injection # 1, sacroiliitis    Dr. Walters

## 2023-04-18 NOTE — H&P (VIEW-ONLY)
Subjective:         Patient ID: Keshia Shepherd is a 38 y.o. female.    Chief Complaint: Back Pain and Knee Pain        Pain  This is a chronic problem. The current episode started more than 1 year ago. The problem occurs daily. The problem has been unchanged. Associated symptoms include arthralgias, myalgias and neck pain. Pertinent negatives include no anorexia, chest pain, chills, coughing, diaphoresis, fatigue, fever, sore throat, vertigo or vomiting.   Review of Systems   Constitutional:  Negative for activity change, appetite change, chills, diaphoresis, fatigue, fever and unexpected weight change.   HENT:  Negative for drooling, ear discharge, ear pain, facial swelling, nosebleeds, sore throat, trouble swallowing, voice change and goiter.    Eyes:  Negative for photophobia, pain, discharge, redness and visual disturbance.   Respiratory:  Negative for apnea, cough, choking, chest tightness, shortness of breath, wheezing and stridor.    Cardiovascular:  Negative for chest pain, palpitations and leg swelling.   Gastrointestinal:  Negative for abdominal distention, anorexia, diarrhea, rectal pain, vomiting and fecal incontinence.   Endocrine: Negative for cold intolerance, heat intolerance, polydipsia, polyphagia and polyuria.   Genitourinary:  Negative for bladder incontinence, dysuria, flank pain, frequency, hematuria and hot flashes.   Musculoskeletal:  Positive for arthralgias, back pain, leg pain, myalgias, neck pain and neck stiffness. Negative for gait problem and joint deformity.   Integumentary:  Negative for color change and pallor.   Allergic/Immunologic: Negative for immunocompromised state.   Neurological:  Negative for dizziness, vertigo, seizures, syncope, facial asymmetry, speech difficulty, light-headedness, coordination difficulties, memory loss and coordination difficulties.   Hematological:  Negative for adenopathy. Does not bruise/bleed easily.   Psychiatric/Behavioral:  Negative for  "agitation, behavioral problems, confusion, decreased concentration, dysphoric mood, hallucinations, self-injury and suicidal ideas. The patient is not nervous/anxious and is not hyperactive.          Past Medical History:   Diagnosis Date    Asthma     Cervical radiculopathy     Chronic pain syndrome     Depressive disorder     Fibromyalgia     GERD (gastroesophageal reflux disease)     Hyperlipidemia     Osteoarthritis     Palpitations     Radiculopathy of cervical region      Past Surgical History:   Procedure Laterality Date    CARPAL TUNNEL RELEASE Bilateral     EPIDURAL STEROID INJECTION N/A 12/29/2022    Procedure: Injection, Steroid, Epidural, L3/4;  Surgeon: Vanessa Walters MD;  Location: [facility];  Service: Pain Management;  Laterality: N/A;    HYSTERECTOMY       Social History     Socioeconomic History    Marital status: Single   Tobacco Use    Smoking status: Never    Smokeless tobacco: Never   Substance and Sexual Activity    Alcohol use: Never    Drug use: Not Currently     Family History   Problem Relation Age of Onset    Arthritis Mother     Cancer Mother     Depression Mother     Diabetes Mother     Heart disease Mother     Hypertension Mother     Stroke Mother     Asthma Daughter     Arthritis Maternal Grandmother     Cancer Maternal Grandmother     Depression Maternal Grandmother     Diabetes Maternal Grandmother     Hypertension Maternal Grandmother     Heart disease Maternal Grandfather     Hypertension Maternal Grandfather     Cancer Paternal Grandmother     Depression Paternal Grandmother     Diabetes Paternal Grandmother      Review of patient's allergies indicates:   Allergen Reactions    Opioids - morphine analogues     Pcn [penicillins]         Objective:  Vitals:    04/19/23 0854   BP: 121/76   Pulse: 80   Resp: 17   SpO2: 99%   Weight: 68.9 kg (152 lb)   Height: 5' 6" (1.676 m)   PainSc:   7         Physical Exam  Vitals and nursing note reviewed. Exam conducted with a " chaperone present.   Constitutional:       General: She is awake. She is not in acute distress.     Appearance: Normal appearance. She is not ill-appearing, toxic-appearing or diaphoretic.   HENT:      Head: Normocephalic and atraumatic.      Nose: Nose normal.      Mouth/Throat:      Mouth: Mucous membranes are moist.      Pharynx: Oropharynx is clear.   Eyes:      Conjunctiva/sclera: Conjunctivae normal.      Pupils: Pupils are equal, round, and reactive to light.   Cardiovascular:      Rate and Rhythm: Normal rate.   Pulmonary:      Effort: Pulmonary effort is normal. No respiratory distress.   Abdominal:      Palpations: Abdomen is soft.   Musculoskeletal:         General: No deformity or signs of injury. Normal range of motion.      Cervical back: Normal range of motion and neck supple.   Skin:     General: Skin is warm and dry.   Neurological:      General: No focal deficit present.      Mental Status: She is alert and oriented to person, place, and time. Mental status is at baseline.      Cranial Nerves: No cranial nerve deficit (II-XII).   Psychiatric:         Mood and Affect: Mood normal.         Behavior: Behavior normal. Behavior is cooperative.         Thought Content: Thought content normal.         FL Fluoro for Pain Management  See OP Notes for results.     IMPRESSION: See OP Notes for results.     This procedure was auto-finalized by: Virtual Radiologist         Office Visit on 02/22/2023   Component Date Value Ref Range Status    POC Amphetamines 02/22/2023 Negative  Negative, Inconclusive Final    POC Barbiturates 02/22/2023 Negative  Negative, Inconclusive Final    POC Benzodiazepines 02/22/2023 Negative  Negative, Inconclusive Final    POC Cocaine 02/22/2023 Negative  Negative, Inconclusive Final    POC THC 02/22/2023 Negative  Negative, Inconclusive Final    POC Methadone 02/22/2023 Negative  Negative, Inconclusive Final    POC Methamphetamine 02/22/2023 Negative  Negative, Inconclusive Final     POC Opiates 02/22/2023 Presumptive Positive (A)  Negative, Inconclusive Final    POC Oxycodone 02/22/2023 Negative  Negative, Inconclusive Final    POC Phencyclidine 02/22/2023 Negative  Negative, Inconclusive Final    POC Methylenedioxymethamphetamine * 02/22/2023 Negative  Negative, Inconclusive Final    POC Tricyclic Antidepressants 02/22/2023 Negative  Negative, Inconclusive Final    POC Buprenorphine 02/22/2023 Negative   Final     Acceptable 02/22/2023 Yes   Final    POC Temperature (Urine) 02/22/2023 94   Final   Office Visit on 01/23/2023   Component Date Value Ref Range Status    POC Amphetamines 01/23/2023 Negative  Negative, Inconclusive Final    POC Barbiturates 01/23/2023 Negative  Negative, Inconclusive Final    POC Benzodiazepines 01/23/2023 Negative  Negative, Inconclusive Final    POC Cocaine 01/23/2023 Negative  Negative, Inconclusive Final    POC THC 01/23/2023 Negative  Negative, Inconclusive Final    POC Methadone 01/23/2023 Negative  Negative, Inconclusive Final    POC Methamphetamine 01/23/2023 Negative  Negative, Inconclusive Final    POC Opiates 01/23/2023 Negative  Negative, Inconclusive Final    POC Oxycodone 01/23/2023 Negative  Negative, Inconclusive Final    POC Phencyclidine 01/23/2023 Negative  Negative, Inconclusive Final    POC Methylenedioxymethamphetamine * 01/23/2023 Negative  Negative, Inconclusive Final    POC Tricyclic Antidepressants 01/23/2023 Negative  Negative, Inconclusive Final    POC Buprenorphine 01/23/2023 Negative   Final     Acceptable 01/23/2023 Yes   Final    POC Temperature (Urine) 01/23/2023 90   Final   Lab Visit on 11/30/2022   Component Date Value Ref Range Status    YOHAN Screen 11/30/2022 Negative  Negative Final    C3 11/30/2022 142  90 - 180 mg/dL Final    C4 11/30/2022 36  10 - 40 mg/dL Final    Complement, Total 11/30/2022 60  30 - 75 U/mL Final    CCP 11/30/2022 <16.0  <=19.9 units Final    Sodium 11/30/2022 139  136 - 145  mmol/L Final    Potassium 11/30/2022 4.1  3.5 - 5.1 mmol/L Final    Chloride 11/30/2022 105  98 - 107 mmol/L Final    CO2 11/30/2022 29  21 - 32 mmol/L Final    Anion Gap 11/30/2022 9  7 - 16 mmol/L Final    Glucose 11/30/2022 86  74 - 106 mg/dL Final    BUN 11/30/2022 9  7 - 18 mg/dL Final    Creatinine 11/30/2022 0.76  0.55 - 1.02 mg/dL Final    BUN/Creatinine Ratio 11/30/2022 12  6 - 20 Final    Calcium 11/30/2022 9.9  8.5 - 10.1 mg/dL Final    Total Protein 11/30/2022 8.0  6.4 - 8.2 g/dL Final    Albumin 11/30/2022 4.2  3.5 - 5.0 g/dL Final    Globulin 11/30/2022 3.8  2.0 - 4.0 g/dL Final    A/G Ratio 11/30/2022 1.1   Final    Bilirubin, Total 11/30/2022 0.3  >0.0 - 1.2 mg/dL Final    Alk Phos 11/30/2022 125 (H)  37 - 98 U/L Final    ALT 11/30/2022 24  13 - 56 U/L Final    AST 11/30/2022 12 (L)  15 - 37 U/L Final    eGFR 11/30/2022 104  >=60 mL/min/1.73m² Final    RA Titer 11/30/2022 <10  0 - 15 IU/mL Final    CRP 11/30/2022 <0.29  0.00 - 0.80 mg/dL Final    ESR Westergren 11/30/2022 9  0 - 20 mm/Hr Final    Syphilis Ab Interpretation 11/30/2022 Non-Reactive  Non-Reactive Final    Free T4 11/30/2022 0.94  0.76 - 1.46 ng/dL Final    TSH 11/30/2022 1.740  0.358 - 3.740 uIU/mL Final    Lupus Anticoagulant Tech Interp 11/30/2022 SEE COMMENTS   Final    Prothrombin Time (PT), P 11/30/2022 11.9  9.4 - 12.5 sec Final    INR 11/30/2022 1.1  0.9 - 1.1 Final    Activated Partial Thrombopl Time, P 11/30/2022 36  25 - 37 sec Final    DRVVT Screen Ratio 11/30/2022 0.92  <1.20 ratio Final    Vitamin D 25-Hydroxy, Blood 11/30/2022 47.2  ng/mL Final    Hepatitis C Ab 11/30/2022 Non-Reactive  Non-Reactive Final    Uric Acid 11/30/2022 3.3  2.6 - 6.0 mg/dL Final    Antistreptolysin O (ASO) 11/30/2022 76.0  0.0 - 408.0 IU/mL Final    Anti-dsDNA 11/30/2022 Negative  Negative Final    Anti-DSDNA (IU) 11/30/2022 3  0 - 24 IU Final    West Nile Virus Ab, IgG 11/30/2022 Negative  Negative Final    West Nile Virus Ab, IgM 11/30/2022  Negative  Negative Final    West Nile Serum Interpretation 11/30/2022 SEE COMMENTS   Final    Anaplasma phagocytophilum Ab, IgG,S 11/30/2022 <1:64  <1:64 titer Final    Ehrlichia Chaffeensis (HME) Ab, IgG 11/30/2022 <1:64  <1:64 titer Final    Spotted Fever Group Ab, IgG, S 11/30/2022 <1:64  <1:64 Final    Spotted Fever Group Ab, IgM, S 11/30/2022 <1:64  <1:64 Final    Lyme IgG/IgM 11/30/2022 Non-Reactive  Non-Reactive Final    CK 11/30/2022 114  26 - 192 U/L Final    Nil Result, TB 11/30/2022 0.05   Final    TB1 Ag minus Nil Result 11/30/2022 0.11   Final    TB2 Ag minus Nil Result 11/30/2022 0.07   Final    Mitogen minus Nil Result, TB 11/30/2022 >10.00   Final    QuantiFERON-Tb Gold Plus 11/30/2022 Negative  Negative Final    WBC 11/30/2022 3.43 (L)  4.50 - 11.00 K/uL Final    RBC 11/30/2022 5.04  4.20 - 5.40 M/uL Final    Hemoglobin 11/30/2022 11.7 (L)  12.0 - 16.0 g/dL Final    Hematocrit 11/30/2022 38.4  38.0 - 47.0 % Final    MCV 11/30/2022 76.2 (L)  80.0 - 96.0 fL Final    MCH 11/30/2022 23.2 (L)  27.0 - 31.0 pg Final    MCHC 11/30/2022 30.5 (L)  32.0 - 36.0 g/dL Final    RDW 11/30/2022 14.1  11.5 - 14.5 % Final    Platelet Count 11/30/2022 365  150 - 400 K/uL Final    MPV 11/30/2022 9.4  9.4 - 12.4 fL Final    Neutrophils % 11/30/2022 46.1 (L)  53.0 - 65.0 % Final    Lymphocytes % 11/30/2022 46.9 (H)  27.0 - 41.0 % Final    Monocytes % 11/30/2022 5.8  2.0 - 6.0 % Final    Eosinophils % 11/30/2022 0.9 (L)  1.0 - 4.0 % Final    Basophils % 11/30/2022 0.3  0.0 - 1.0 % Final    Immature Granulocytes % 11/30/2022 0.0  0.0 - 0.4 % Final    nRBC, Auto 11/30/2022 0.0  <=0.0 % Final    Neutrophils, Abs 11/30/2022 1.58 (L)  1.80 - 7.70 K/uL Final    Lymphocytes, Absolute 11/30/2022 1.61  1.00 - 4.80 K/uL Final    Monocytes, Absolute 11/30/2022 0.20  0.00 - 0.80 K/uL Final    Eosinophils, Absolute 11/30/2022 0.03  0.00 - 0.50 K/uL Final    Basophils, Absolute 11/30/2022 0.01  0.00 - 0.20 K/uL Final    Immature  Granulocytes, Absolute 11/30/2022 0.00  0.00 - 0.04 K/uL Final    nRBC, Absolute 11/30/2022 0.00  <=0.00 x10e3/uL Final    Diff Type 11/30/2022 Auto   Final    HLA-B27 Result 11/30/2022 Negative  Not Applicable Final    Interpretation 11/30/2022 SEE COMMENTS   Final         Orders Placed This Encounter   Procedures    Case Request Operating Room: BLOCK, SACROILIAC JOINT     Order Specific Question:   Medical Necessity:     Answer:   Medically Non-Urgent [100]     Order Specific Question:   CPT Code:     Answer:   NE INJECTION,SACROILIAC JOINT [16995]     Order Specific Question:   Is an on-site pathologist required for this procedure?     Answer:   N/A         Requested Prescriptions     Signed Prescriptions Disp Refills    acetaminophen-codeine 300-30mg (TYLENOL #3) 300-30 mg Tab 30 tablet 0     Sig: Take 1 tablet by mouth daily as needed (Pain).       Assessment:     1. Sacroiliitis    2. Lumbar radiculopathy, chronic    3. Chronic pain of both knees    4. Polyarthralgia         A's of Opioid Risk Assessment  Activity:Patient can perform ADL.   Analgesia:Patients pain is partially controlled by current medication. Patient has tried OTC medications such as Tylenol and Ibuprofen with out relief.   Adverse Effects: Patient denies constipation or sedation.  Aberrant Behavior:  reviewed with no aberrant drug seeking/taking behavior.  Overdose reversal drug naloxone discussed     Drug screen reviewed    Plan:    Narcan January 2023    She had lumbar L3/4 CARMEN # 1 December 29, 2022 she states she would 80% relief initially, maintain 50% relief with time, she states procedure did help improve her level function     Complaint of back pain pointing to her bilateral sacroiliac area she states has been ongoing for more than 3 months, pain is sacroiliac in nature    Requesting procedure    MRI lumbar spine [facility] November 30, 2022 L3/4 far lateral disc bulge right greater than left multiple level degenerative changes      X-ray bilateral knees [facility] November 30, 2022 degenerative changes no fracture noted    X-ray lumbar spine [facility] November 30, 2022 degenerative changes no fracture noted    X-ray hips [facility] November 30, 2022 degenerative changes no fracture noted    Continue home exercise program as directed     Tender bilateral sacroiliac area  Bilateral sacroiliac compression test positive  Jose's /Luis's positive bilateral  Gaenslen positive bilateral    Ongoing Physical therapy/home exercise program as directed no longer controlling discomfort    Patient's pain medication no longer helping control discomfort    Schedule bilateral sacroiliac injection # 1, sacroiliitis    Dr. Walters

## 2023-04-19 ENCOUNTER — OFFICE VISIT (OUTPATIENT)
Dept: PAIN MEDICINE | Facility: CLINIC | Age: 38
End: 2023-04-19
Payer: COMMERCIAL

## 2023-04-19 VITALS
RESPIRATION RATE: 17 BRPM | SYSTOLIC BLOOD PRESSURE: 121 MMHG | HEART RATE: 80 BPM | DIASTOLIC BLOOD PRESSURE: 76 MMHG | HEIGHT: 66 IN | WEIGHT: 152 LBS | BODY MASS INDEX: 24.43 KG/M2 | OXYGEN SATURATION: 99 %

## 2023-04-19 DIAGNOSIS — M25.562 CHRONIC PAIN OF BOTH KNEES: Chronic | ICD-10-CM

## 2023-04-19 DIAGNOSIS — G89.29 CHRONIC PAIN OF BOTH KNEES: Chronic | ICD-10-CM

## 2023-04-19 DIAGNOSIS — M25.561 CHRONIC PAIN OF BOTH KNEES: Chronic | ICD-10-CM

## 2023-04-19 DIAGNOSIS — M46.1 SACROILIITIS: Primary | Chronic | ICD-10-CM

## 2023-04-19 DIAGNOSIS — M54.16 LUMBAR RADICULOPATHY, CHRONIC: Chronic | ICD-10-CM

## 2023-04-19 DIAGNOSIS — M25.50 POLYARTHRALGIA: Chronic | ICD-10-CM

## 2023-04-19 PROCEDURE — 3078F DIAST BP <80 MM HG: CPT | Mod: ,,, | Performed by: PHYSICIAN ASSISTANT

## 2023-04-19 PROCEDURE — 3078F PR MOST RECENT DIASTOLIC BLOOD PRESSURE < 80 MM HG: ICD-10-PCS | Mod: ,,, | Performed by: PHYSICIAN ASSISTANT

## 2023-04-19 PROCEDURE — 3008F PR BODY MASS INDEX (BMI) DOCUMENTED: ICD-10-PCS | Mod: ,,, | Performed by: PHYSICIAN ASSISTANT

## 2023-04-19 PROCEDURE — 3074F PR MOST RECENT SYSTOLIC BLOOD PRESSURE < 130 MM HG: ICD-10-PCS | Mod: ,,, | Performed by: PHYSICIAN ASSISTANT

## 2023-04-19 PROCEDURE — 99215 OFFICE O/P EST HI 40 MIN: CPT | Mod: PBBFAC | Performed by: PHYSICIAN ASSISTANT

## 2023-04-19 PROCEDURE — 1159F PR MEDICATION LIST DOCUMENTED IN MEDICAL RECORD: ICD-10-PCS | Mod: ,,, | Performed by: PHYSICIAN ASSISTANT

## 2023-04-19 PROCEDURE — 3008F BODY MASS INDEX DOCD: CPT | Mod: ,,, | Performed by: PHYSICIAN ASSISTANT

## 2023-04-19 PROCEDURE — 99214 PR OFFICE/OUTPT VISIT, EST, LEVL IV, 30-39 MIN: ICD-10-PCS | Mod: S$PBB,,, | Performed by: PHYSICIAN ASSISTANT

## 2023-04-19 PROCEDURE — 3074F SYST BP LT 130 MM HG: CPT | Mod: ,,, | Performed by: PHYSICIAN ASSISTANT

## 2023-04-19 PROCEDURE — 99214 OFFICE O/P EST MOD 30 MIN: CPT | Mod: S$PBB,,, | Performed by: PHYSICIAN ASSISTANT

## 2023-04-19 PROCEDURE — 1159F MED LIST DOCD IN RCRD: CPT | Mod: ,,, | Performed by: PHYSICIAN ASSISTANT

## 2023-04-19 RX ORDER — ACETAMINOPHEN AND CODEINE PHOSPHATE 300; 30 MG/1; MG/1
1 TABLET ORAL DAILY PRN
Qty: 30 TABLET | Refills: 0 | Status: SHIPPED | OUTPATIENT
Start: 2023-04-19 | End: 2023-05-23

## 2023-04-19 NOTE — PATIENT INSTRUCTIONS
Procedure Instructions:    Nothing to eat or drink for 8 hours or after midnight including gum, candy, mints, or tobacco products.  If you are scheduled for 1:30 or later nothing to eat or drink after 5 a.m. the morning of the procedure, including gum, candy, mints, or tobacco products.  Must have a  at least 18 yrs of age to stay present at all times  No Diabetic medications the morning of procedure, check blood sugar the morning of procedure, if it is greater than 200 call the office at [phone]  If you are started on antibiotics or have been prescribed antibiotics, have a fever, or have any other type of infection call to reschedule procedure.  If you take blood pressure medications you can take it at your regular scheduled time with a small sip of WATER!    HOLD ASPIRIN AND ASPIRIN PRODUCTS  (ASPIRIN, BC POWDER ETC. ) FOR 7 DAYS  PRIOR TO PROCEDURE  HOLD NSAIDS( ibuprofen, mobic, meloxicam, advil, diclofenac, naproxen, relafen, celebrex,  methotrexate, aleve etc....)  FOR 3 DAYS   PRIOR TO PROCEDURE

## 2023-05-09 ENCOUNTER — ANESTHESIA EVENT (OUTPATIENT)
Dept: PAIN MEDICINE | Facility: HOSPITAL | Age: 38
End: 2023-05-09
Payer: COMMERCIAL

## 2023-05-09 ENCOUNTER — HOSPITAL ENCOUNTER (OUTPATIENT)
Facility: HOSPITAL | Age: 38
Discharge: HOME | End: 2023-05-09
Attending: PAIN MEDICINE | Admitting: PAIN MEDICINE
Payer: COMMERCIAL

## 2023-05-09 ENCOUNTER — ANESTHESIA (OUTPATIENT)
Dept: PAIN MEDICINE | Facility: HOSPITAL | Age: 38
End: 2023-05-09
Payer: COMMERCIAL

## 2023-05-09 VITALS
RESPIRATION RATE: 15 BRPM | SYSTOLIC BLOOD PRESSURE: 120 MMHG | DIASTOLIC BLOOD PRESSURE: 78 MMHG | TEMPERATURE: 98 F | HEIGHT: 66 IN | HEART RATE: 69 BPM | BODY MASS INDEX: 25.07 KG/M2 | WEIGHT: 156 LBS | OXYGEN SATURATION: 100 %

## 2023-05-09 DIAGNOSIS — M46.1 BILATERAL SACROILIITIS: ICD-10-CM

## 2023-05-09 PROCEDURE — 37000009 HC ANESTHESIA EA ADD 15 MINS: Performed by: PAIN MEDICINE

## 2023-05-09 PROCEDURE — 27000284 HC CANNULA NASAL: Performed by: NURSE ANESTHETIST, CERTIFIED REGISTERED

## 2023-05-09 PROCEDURE — 63600175 PHARM REV CODE 636 W HCPCS: Performed by: PAIN MEDICINE

## 2023-05-09 PROCEDURE — 37000008 HC ANESTHESIA 1ST 15 MINUTES: Performed by: PAIN MEDICINE

## 2023-05-09 PROCEDURE — 27096 PR INJECTION,SACROILIAC JOINT: ICD-10-PCS | Mod: 50,,, | Performed by: PAIN MEDICINE

## 2023-05-09 PROCEDURE — D9220A PRA ANESTHESIA: Mod: ,,, | Performed by: NURSE ANESTHETIST, CERTIFIED REGISTERED

## 2023-05-09 PROCEDURE — 27096 INJECT SACROILIAC JOINT: CPT | Mod: 50,,, | Performed by: PAIN MEDICINE

## 2023-05-09 PROCEDURE — 64451 NJX AA&/STRD NRV NRVTG SI JT: CPT | Mod: RT | Performed by: PAIN MEDICINE

## 2023-05-09 PROCEDURE — D9220A PRA ANESTHESIA: ICD-10-PCS | Mod: ,,, | Performed by: NURSE ANESTHETIST, CERTIFIED REGISTERED

## 2023-05-09 PROCEDURE — 27096 INJECT SACROILIAC JOINT: CPT | Mod: RT | Performed by: PAIN MEDICINE

## 2023-05-09 PROCEDURE — 25000003 PHARM REV CODE 250: Performed by: NURSE ANESTHETIST, CERTIFIED REGISTERED

## 2023-05-09 PROCEDURE — 25500020 PHARM REV CODE 255: Performed by: PAIN MEDICINE

## 2023-05-09 PROCEDURE — 25000003 PHARM REV CODE 250: Performed by: PAIN MEDICINE

## 2023-05-09 PROCEDURE — 63600175 PHARM REV CODE 636 W HCPCS: Performed by: NURSE ANESTHETIST, CERTIFIED REGISTERED

## 2023-05-09 RX ORDER — SODIUM CHLORIDE 9 MG/ML
INJECTION, SOLUTION INTRAVENOUS CONTINUOUS
Status: DISCONTINUED | OUTPATIENT
Start: 2023-05-09 | End: 2023-05-09 | Stop reason: HOSPADM

## 2023-05-09 RX ORDER — LIDOCAINE HYDROCHLORIDE 20 MG/ML
INJECTION INTRAVENOUS
Status: DISCONTINUED | OUTPATIENT
Start: 2023-05-09 | End: 2023-05-09

## 2023-05-09 RX ORDER — PROPOFOL 10 MG/ML
VIAL (ML) INTRAVENOUS
Status: DISCONTINUED | OUTPATIENT
Start: 2023-05-09 | End: 2023-05-09

## 2023-05-09 RX ORDER — TRIAMCINOLONE ACETONIDE 40 MG/ML
INJECTION, SUSPENSION INTRA-ARTICULAR; INTRAMUSCULAR CODE/TRAUMA/SEDATION MEDICATION
Status: DISCONTINUED | OUTPATIENT
Start: 2023-05-09 | End: 2023-05-09 | Stop reason: HOSPADM

## 2023-05-09 RX ORDER — BUPIVACAINE HYDROCHLORIDE 2.5 MG/ML
INJECTION, SOLUTION EPIDURAL; INFILTRATION; INTRACAUDAL CODE/TRAUMA/SEDATION MEDICATION
Status: DISCONTINUED | OUTPATIENT
Start: 2023-05-09 | End: 2023-05-09 | Stop reason: HOSPADM

## 2023-05-09 RX ADMIN — SODIUM CHLORIDE: 9 INJECTION, SOLUTION INTRAVENOUS at 01:05

## 2023-05-09 RX ADMIN — LIDOCAINE HYDROCHLORIDE 50 MG: 20 INJECTION, SOLUTION INTRAVENOUS at 01:05

## 2023-05-09 RX ADMIN — PROPOFOL 50 MG: 10 INJECTION, EMULSION INTRAVENOUS at 01:05

## 2023-05-09 NOTE — ANESTHESIA POSTPROCEDURE EVALUATION
Anesthesia Post Evaluation    Patient: Keshia Shepherd    Procedure(s) Performed: Procedure(s) (LRB):  BLOCK, SACROILIAC JOINT (Bilateral)    Final Anesthesia Type: general      Patient location during evaluation: GI PACU  Patient participation: Yes- Able to Participate  Level of consciousness: awake and alert  Post-procedure vital signs: reviewed and stable  Pain management: adequate  Airway patency: patent    PONV status at discharge: No PONV  Anesthetic complications: no      Cardiovascular status: stable  Respiratory status: unassisted, spontaneous ventilation and room air  Hydration status: euvolemic  Follow-up not needed.          Vitals Value Taken Time   /72 05/09/23 1436   Temp 36.7 °C (98 °F) 05/09/23 1406   Pulse 74 05/09/23 1437   Resp 0 05/09/23 1437   SpO2 100 % 05/09/23 1437   Vitals shown include unvalidated device data.      Event Time   Out of Recovery 14:37:00         Pain/Jack Score: Jack Score: 10 (5/9/2023  2:35 PM)

## 2023-05-09 NOTE — OP NOTE
Procedure Note    Procedure Date: 5/9/2023    Procedure Performed: Bilateral Sacroiliac joint injection, with Fluoroscopic Guidance    Indications: Patient has failed conservative therapy.      Pre-op diagnosis: Bilateral  sided Sacroiliitis    Post-op diagnosis: same    Physician: VAIBHAV Walters MD    Anesthesia: MAC    Medications injected: 0.25% Bupivacaine, 1% Lidocaine, Kenalog 40mg    Local anesthetic used: 1% Lidocaine, 2ml    Estimated Blood Loss: None    Complications:None    Technique:  The patient was interviewed in the holding area and Risks/Benefits were discussed, including, but not limited to, the possibility of new or different pain, bleeding or infection.   All questions were answered.  The patient understood and accepted risks.  Consent was reviewed and signed.   A time out was taken to identify the patient, procedure and side of the procedure.  The skin was prepped with chloroprep and sterile drapes were applied. The bilateral  SI joint was identified by fluoroscopy in an oblique plane. Skin and subcutaneous tissues overyling the target area was anesthetized with 1-2 mL of Lidocaine 1%.  A 22g 3 1/2 inch spinal needle was advanced under intermittent fluoroscopy until joint space was entered at the junction of the superior 2/3 & inferior 1/3 of the joint.  There was no paresthesia with needle placement. Aspiration was negative for blood. Next, a 2 cc solution from a mixture of 40 mg kenalog and 3mL of 0.25% Marcaine was injected without difficulty or resistance into each joint. The needle was removed and a sterile Band-Aid dressing was applied to the puncture site. The patient tolerated the procedure well.  The patient was monitored after the procedure.  The patient was given post procedure and discharge instructions to follow at home. The patient was discharged in a stable condition

## 2023-05-09 NOTE — DISCHARGE SUMMARY
[facility] - Pain Management  Discharge Note  Short Stay    Procedure(s) (LRB):  BLOCK, SACROILIAC JOINT (Bilateral)      OUTCOME: Patient tolerated treatment/procedure well without complication and is now ready for discharge.    DISPOSITION: Home or Self Care    FINAL DIAGNOSIS:  Bilateral sacroiliitis    FOLLOWUP: In clinic    DISCHARGE INSTRUCTIONS:  See nurse's notes     TIME SPENT ON DISCHARGE: 5 minutes

## 2023-05-09 NOTE — ANESTHESIA PREPROCEDURE EVALUATION
05/09/2023  Keshia Shepherd is a 38 y.o., female.      Pre-op Assessment    I have reviewed the Patient Summary Reports.     I have reviewed the Nursing Notes. I have reviewed the NPO Status.   I have reviewed the Medications.     Review of Systems  Anesthesia Hx:  No problems with previous Anesthesia    Social:  Non-Smoker, No Alcohol Use    Hematology/Oncology:  Hematology Normal   Oncology Normal     EENT/Dental:EENT/Dental Normal   Cardiovascular:   hyperlipidemia    Pulmonary:   Asthma    Renal/:  Renal/ Normal     Hepatic/GI:   GERD    Musculoskeletal:   Arthritis     Neurological:   Neuromuscular Disease,    Endocrine:  Endocrine Normal    Dermatological:  Skin Normal    Psych:   Psychiatric History depression          Physical Exam  General: Well nourished, Cooperative, Alert and Oriented    Airway:  Mallampati: II   Mouth Opening: Normal  TM Distance: Normal  Tongue: Normal  Neck ROM: Normal ROM    Dental:  Intact    Chest/Lungs:  Clear to auscultation, Normal Respiratory Rate    Heart:  Rate: Normal  Rhythm: Regular Rhythm  Sounds: Normal    Abdomen:  Normal, Soft, Nontender        Anesthesia Plan  Type of Anesthesia, risks & benefits discussed:    Anesthesia Type: Gen Natural Airway, MAC  Intra-op Monitoring Plan: Standard ASA Monitors  Post Op Pain Control Plan: multimodal analgesia and IV/PO Opioids PRN  Induction:  IV  Informed Consent: Informed consent signed with the Patient and all parties understand the risks and agree with anesthesia plan.  All questions answered.   ASA Score: 2  Day of Surgery Review of History & Physical: I have interviewed and examined the patient. I have reviewed the patient's H&P dated:     Ready For Surgery From Anesthesia Perspective.     .

## 2023-05-09 NOTE — TRANSFER OF CARE
"Anesthesia Transfer of Care Note    Patient: Keshia Shepherd    Procedure(s) Performed: Procedure(s) (LRB):  BLOCK, SACROILIAC JOINT (Bilateral)    Patient location: Other: pain    Anesthesia Type: general    Transport from OR: Transported from OR on room air with adequate spontaneous ventilation    Post pain: adequate analgesia    Post assessment: no apparent anesthetic complications and tolerated procedure well    Post vital signs: stable    Level of consciousness: responds to stimulation    Nausea/Vomiting: no nausea/vomiting    Complications: none    Transfer of care protocol was followed      Last vitals:   Visit Vitals  /61 (BP Location: Left arm, Patient Position: Lying)   Pulse 75   Temp 36.7 °C (98 °F) (Skin)   Resp 16   Ht 5' 6" (1.676 m)   Wt 70.8 kg (156 lb)   SpO2 100%   BMI 25.18 kg/m²     "

## 2023-05-09 NOTE — PLAN OF CARE
REFER TO WRITTEN DOCUMENT AND RECOVERY INFORMATION.    D/CD PATIENT VIAA WHEELCHAIR AT 1450.    INFORMED PATIENT IF UNABLE TO VOID IN 8 HOURS, GO TO ER. NOTIFY MD OF REDNESS OR DRAINAGE FROM INJECTION SITE OR FEVER OVER 3-4 DAY. REST AND DRINK PLENTY OF FLUIDS FOR THE REMAINDER OF THE DAY. NO LIFTING OVER 5 LBS FOR THE REMAINDER OF THE DAY. CONTINUE REGULAR MEDICATIONS AS PRESCRIBED. MAY TAKE PAIN MEDICATION AS PRESCRIBED.     PAIN IMPROVED  100%

## 2023-05-09 NOTE — BRIEF OP NOTE
Discharge Note  Short Stay    Admit Date: 5/9/2023    Discharge Date: 5/9/2023    Attending Physician: Vanessa Walters     Discharge Provider: Vanessa Walters    Diagnosis:  Bilateral sacroiliitis    Discharged Condition: Good    Final Diagnoses: Sacroiliitis [M46.1]    Disposition: Home or Self Care    Hospital Course: No complications, uneventful    Outcome of Hospitalization, Treatment, Procedure, or Surgery:  Patient was admitted for outpatient interventional pain management procedure. The patient tolerated the procedure well with no complications.    Follow up/Patient Instructions:  Follow up as scheduled in Pain Management office in 3-4 weeks.  Patient has received instructions and follow up date and time.    Medications:  Continue previous medications

## 2023-05-22 RX ORDER — ACETAMINOPHEN AND CODEINE PHOSPHATE 300; 30 MG/1; MG/1
1 TABLET ORAL DAILY PRN
Qty: 30 TABLET | Refills: 0 | Status: CANCELLED | OUTPATIENT
Start: 2023-05-22

## 2023-05-22 NOTE — H&P (VIEW-ONLY)
Subjective:         Patient ID: Keshia Shepherd is a 38 y.o. female.    Chief Complaint: Low-back Pain and Knee Pain (Bilateral knee pain)        Pain  This is a chronic problem. The current episode started more than 1 year ago. The problem occurs daily. The problem has been waxing and waning. Associated symptoms include arthralgias, myalgias and neck pain. Pertinent negatives include no anorexia, chest pain, chills, coughing, diaphoresis, fever, sore throat, vertigo or vomiting.   Review of Systems   Constitutional:  Negative for activity change, appetite change, chills, diaphoresis, fever and unexpected weight change.   HENT:  Negative for drooling, ear discharge, ear pain, facial swelling, nosebleeds, sore throat, trouble swallowing, voice change and goiter.    Eyes:  Negative for photophobia, pain, discharge, redness and visual disturbance.   Respiratory:  Negative for apnea, cough, choking, chest tightness, shortness of breath, wheezing and stridor.    Cardiovascular:  Negative for chest pain, palpitations and leg swelling.   Gastrointestinal:  Negative for abdominal distention, anorexia, diarrhea, rectal pain, vomiting and fecal incontinence.   Endocrine: Negative for cold intolerance, heat intolerance, polydipsia, polyphagia and polyuria.   Genitourinary:  Negative for bladder incontinence, dysuria, flank pain, frequency, hematuria and hot flashes.   Musculoskeletal:  Positive for arthralgias, back pain, leg pain, myalgias, neck pain and neck stiffness. Negative for gait problem and joint deformity.   Integumentary:  Negative for color change and pallor.   Allergic/Immunologic: Negative for immunocompromised state.   Neurological:  Negative for dizziness, vertigo, seizures, syncope, facial asymmetry, speech difficulty, light-headedness, coordination difficulties, memory loss and coordination difficulties.   Hematological:  Negative for adenopathy. Does not bruise/bleed easily.   Psychiatric/Behavioral:   Negative for agitation, behavioral problems, confusion, decreased concentration, dysphoric mood, hallucinations, self-injury and suicidal ideas. The patient is not nervous/anxious and is not hyperactive.          Past Medical History:   Diagnosis Date    Asthma     Cervical radiculopathy     Chronic pain syndrome     Depressive disorder     Fibromyalgia     GERD (gastroesophageal reflux disease)     Hyperlipidemia     Osteoarthritis     Palpitations     Radiculopathy of cervical region      Past Surgical History:   Procedure Laterality Date    CARPAL TUNNEL RELEASE Bilateral     EPIDURAL STEROID INJECTION N/A 12/29/2022    Procedure: Injection, Steroid, Epidural, L3/4;  Surgeon: Vanessa Walters MD;  Location: [facility];  Service: Pain Management;  Laterality: N/A;    HYSTERECTOMY      INJECTION OF ANESTHETIC AGENT INTO SACROILIAC JOINT Bilateral 5/9/2023    Procedure: BLOCK, SACROILIAC JOINT;  Surgeon: Vanessa Walters MD;  Location: [facility];  Service: Pain Management;  Laterality: Bilateral;     Social History     Socioeconomic History    Marital status: Single   Tobacco Use    Smoking status: Never    Smokeless tobacco: Never   Substance and Sexual Activity    Alcohol use: Never    Drug use: Not Currently     Family History   Problem Relation Age of Onset    Arthritis Mother     Cancer Mother     Depression Mother     Diabetes Mother     Heart disease Mother     Hypertension Mother     Stroke Mother     Asthma Daughter     Arthritis Maternal Grandmother     Cancer Maternal Grandmother     Depression Maternal Grandmother     Diabetes Maternal Grandmother     Hypertension Maternal Grandmother     Heart disease Maternal Grandfather     Hypertension Maternal Grandfather     Cancer Paternal Grandmother     Depression Paternal Grandmother     Diabetes Paternal Grandmother      Review of patient's allergies indicates:   Allergen Reactions    Opioids - morphine analogues     Pcn [penicillins]        "  Objective:  Vitals:    05/23/23 1435   BP: 126/89   Pulse: 84   Resp: 18   Weight: 70.8 kg (156 lb)   Height: 5' 6" (1.676 m)   PainSc:   7         Physical Exam  Vitals and nursing note reviewed. Exam conducted with a chaperone present.   Constitutional:       General: She is awake. She is not in acute distress.     Appearance: Normal appearance. She is not ill-appearing, toxic-appearing or diaphoretic.   HENT:      Head: Normocephalic and atraumatic.      Nose: Nose normal.      Mouth/Throat:      Mouth: Mucous membranes are moist.      Pharynx: Oropharynx is clear.   Eyes:      Conjunctiva/sclera: Conjunctivae normal.      Pupils: Pupils are equal, round, and reactive to light.   Cardiovascular:      Rate and Rhythm: Normal rate.   Pulmonary:      Effort: Pulmonary effort is normal. No respiratory distress.   Abdominal:      Palpations: Abdomen is soft.   Musculoskeletal:         General: No deformity or signs of injury. Normal range of motion.      Cervical back: Normal range of motion and neck supple.   Skin:     General: Skin is warm and dry.   Neurological:      General: No focal deficit present.      Mental Status: She is alert and oriented to person, place, and time. Mental status is at baseline.      Cranial Nerves: No cranial nerve deficit (II-XII).   Psychiatric:         Mood and Affect: Mood normal.         Behavior: Behavior normal. Behavior is cooperative.         Thought Content: Thought content normal.         FL Fluoro for Pain Management  See OP Notes for results.     IMPRESSION: See OP Notes for results.     This procedure was auto-finalized by: Virtual Radiologist         Office Visit on 02/22/2023   Component Date Value Ref Range Status    POC Amphetamines 02/22/2023 Negative  Negative, Inconclusive Final    POC Barbiturates 02/22/2023 Negative  Negative, Inconclusive Final    POC Benzodiazepines 02/22/2023 Negative  Negative, Inconclusive Final    POC Cocaine 02/22/2023 Negative  Negative, " Inconclusive Final    POC THC 02/22/2023 Negative  Negative, Inconclusive Final    POC Methadone 02/22/2023 Negative  Negative, Inconclusive Final    POC Methamphetamine 02/22/2023 Negative  Negative, Inconclusive Final    POC Opiates 02/22/2023 Presumptive Positive (A)  Negative, Inconclusive Final    POC Oxycodone 02/22/2023 Negative  Negative, Inconclusive Final    POC Phencyclidine 02/22/2023 Negative  Negative, Inconclusive Final    POC Methylenedioxymethamphetamine * 02/22/2023 Negative  Negative, Inconclusive Final    POC Tricyclic Antidepressants 02/22/2023 Negative  Negative, Inconclusive Final    POC Buprenorphine 02/22/2023 Negative   Final     Acceptable 02/22/2023 Yes   Final    POC Temperature (Urine) 02/22/2023 94   Final   Office Visit on 01/23/2023   Component Date Value Ref Range Status    POC Amphetamines 01/23/2023 Negative  Negative, Inconclusive Final    POC Barbiturates 01/23/2023 Negative  Negative, Inconclusive Final    POC Benzodiazepines 01/23/2023 Negative  Negative, Inconclusive Final    POC Cocaine 01/23/2023 Negative  Negative, Inconclusive Final    POC THC 01/23/2023 Negative  Negative, Inconclusive Final    POC Methadone 01/23/2023 Negative  Negative, Inconclusive Final    POC Methamphetamine 01/23/2023 Negative  Negative, Inconclusive Final    POC Opiates 01/23/2023 Negative  Negative, Inconclusive Final    POC Oxycodone 01/23/2023 Negative  Negative, Inconclusive Final    POC Phencyclidine 01/23/2023 Negative  Negative, Inconclusive Final    POC Methylenedioxymethamphetamine * 01/23/2023 Negative  Negative, Inconclusive Final    POC Tricyclic Antidepressants 01/23/2023 Negative  Negative, Inconclusive Final    POC Buprenorphine 01/23/2023 Negative   Final     Acceptable 01/23/2023 Yes   Final    POC Temperature (Urine) 01/23/2023 90   Final   Lab Visit on 11/30/2022   Component Date Value Ref Range Status    YOHAN Screen 11/30/2022 Negative  Negative  Final    C3 11/30/2022 142  90 - 180 mg/dL Final    C4 11/30/2022 36  10 - 40 mg/dL Final    Complement, Total 11/30/2022 60  30 - 75 U/mL Final    CCP 11/30/2022 <16.0  <=19.9 units Final    Sodium 11/30/2022 139  136 - 145 mmol/L Final    Potassium 11/30/2022 4.1  3.5 - 5.1 mmol/L Final    Chloride 11/30/2022 105  98 - 107 mmol/L Final    CO2 11/30/2022 29  21 - 32 mmol/L Final    Anion Gap 11/30/2022 9  7 - 16 mmol/L Final    Glucose 11/30/2022 86  74 - 106 mg/dL Final    BUN 11/30/2022 9  7 - 18 mg/dL Final    Creatinine 11/30/2022 0.76  0.55 - 1.02 mg/dL Final    BUN/Creatinine Ratio 11/30/2022 12  6 - 20 Final    Calcium 11/30/2022 9.9  8.5 - 10.1 mg/dL Final    Total Protein 11/30/2022 8.0  6.4 - 8.2 g/dL Final    Albumin 11/30/2022 4.2  3.5 - 5.0 g/dL Final    Globulin 11/30/2022 3.8  2.0 - 4.0 g/dL Final    A/G Ratio 11/30/2022 1.1   Final    Bilirubin, Total 11/30/2022 0.3  >0.0 - 1.2 mg/dL Final    Alk Phos 11/30/2022 125 (H)  37 - 98 U/L Final    ALT 11/30/2022 24  13 - 56 U/L Final    AST 11/30/2022 12 (L)  15 - 37 U/L Final    eGFR 11/30/2022 104  >=60 mL/min/1.73m² Final    RA Titer 11/30/2022 <10  0 - 15 IU/mL Final    CRP 11/30/2022 <0.29  0.00 - 0.80 mg/dL Final    ESR Westergren 11/30/2022 9  0 - 20 mm/Hr Final    Syphilis Ab Interpretation 11/30/2022 Non-Reactive  Non-Reactive Final    Free T4 11/30/2022 0.94  0.76 - 1.46 ng/dL Final    TSH 11/30/2022 1.740  0.358 - 3.740 uIU/mL Final    Lupus Anticoagulant Tech Interp 11/30/2022 SEE COMMENTS   Final    Prothrombin Time (PT), P 11/30/2022 11.9  9.4 - 12.5 sec Final    INR 11/30/2022 1.1  0.9 - 1.1 Final    Activated Partial Thrombopl Time, P 11/30/2022 36  25 - 37 sec Final    DRVVT Screen Ratio 11/30/2022 0.92  <1.20 ratio Final    Vitamin D 25-Hydroxy, Blood 11/30/2022 47.2  ng/mL Final    Hepatitis C Ab 11/30/2022 Non-Reactive  Non-Reactive Final    Uric Acid 11/30/2022 3.3  2.6 - 6.0 mg/dL Final    Antistreptolysin O (ASO) 11/30/2022 76.0   0.0 - 408.0 IU/mL Final    Anti-dsDNA 11/30/2022 Negative  Negative Final    Anti-DSDNA (IU) 11/30/2022 3  0 - 24 IU Final    West Nile Virus Ab, IgG 11/30/2022 Negative  Negative Final    West Nile Virus Ab, IgM 11/30/2022 Negative  Negative Final    West Nile Serum Interpretation 11/30/2022 SEE COMMENTS   Final    Anaplasma phagocytophilum Ab, IgG,S 11/30/2022 <1:64  <1:64 titer Final    Ehrlichia Chaffeensis (HME) Ab, IgG 11/30/2022 <1:64  <1:64 titer Final    Spotted Fever Group Ab, IgG, S 11/30/2022 <1:64  <1:64 Final    Spotted Fever Group Ab, IgM, S 11/30/2022 <1:64  <1:64 Final    Lyme IgG/IgM 11/30/2022 Non-Reactive  Non-Reactive Final    CK 11/30/2022 114  26 - 192 U/L Final    Nil Result, TB 11/30/2022 0.05   Final    TB1 Ag minus Nil Result 11/30/2022 0.11   Final    TB2 Ag minus Nil Result 11/30/2022 0.07   Final    Mitogen minus Nil Result, TB 11/30/2022 >10.00   Final    QuantiFERON-Tb Gold Plus 11/30/2022 Negative  Negative Final    WBC 11/30/2022 3.43 (L)  4.50 - 11.00 K/uL Final    RBC 11/30/2022 5.04  4.20 - 5.40 M/uL Final    Hemoglobin 11/30/2022 11.7 (L)  12.0 - 16.0 g/dL Final    Hematocrit 11/30/2022 38.4  38.0 - 47.0 % Final    MCV 11/30/2022 76.2 (L)  80.0 - 96.0 fL Final    MCH 11/30/2022 23.2 (L)  27.0 - 31.0 pg Final    MCHC 11/30/2022 30.5 (L)  32.0 - 36.0 g/dL Final    RDW 11/30/2022 14.1  11.5 - 14.5 % Final    Platelet Count 11/30/2022 365  150 - 400 K/uL Final    MPV 11/30/2022 9.4  9.4 - 12.4 fL Final    Neutrophils % 11/30/2022 46.1 (L)  53.0 - 65.0 % Final    Lymphocytes % 11/30/2022 46.9 (H)  27.0 - 41.0 % Final    Monocytes % 11/30/2022 5.8  2.0 - 6.0 % Final    Eosinophils % 11/30/2022 0.9 (L)  1.0 - 4.0 % Final    Basophils % 11/30/2022 0.3  0.0 - 1.0 % Final    Immature Granulocytes % 11/30/2022 0.0  0.0 - 0.4 % Final    nRBC, Auto 11/30/2022 0.0  <=0.0 % Final    Neutrophils, Abs 11/30/2022 1.58 (L)  1.80 - 7.70 K/uL Final    Lymphocytes, Absolute 11/30/2022 1.61  1.00 -  4.80 K/uL Final    Monocytes, Absolute 11/30/2022 0.20  0.00 - 0.80 K/uL Final    Eosinophils, Absolute 11/30/2022 0.03  0.00 - 0.50 K/uL Final    Basophils, Absolute 11/30/2022 0.01  0.00 - 0.20 K/uL Final    Immature Granulocytes, Absolute 11/30/2022 0.00  0.00 - 0.04 K/uL Final    nRBC, Absolute 11/30/2022 0.00  <=0.00 x10e3/uL Final    Diff Type 11/30/2022 Auto   Final    HLA-B27 Result 11/30/2022 Negative  Not Applicable Final    Interpretation 11/30/2022 SEE COMMENTS   Final         Orders Placed This Encounter   Procedures    Case Request Operating Room: BLOCK, SACROILIAC JOINT     Order Specific Question:   Medical Necessity:     Answer:   Medically Non-Urgent [100]     Order Specific Question:   CPT Code:     Answer:   [place] INJECTION,SACROILIAC JOINT [60856]     Order Specific Question:   Is an on-site pathologist required for this procedure?     Answer:   N/A         Requested Prescriptions     Signed Prescriptions Disp Refills    buprenorphine 5 mcg/hour (BUTRANS) weekly patch 4 patch 0     Sig: Place 1 patch onto the skin once a week.       Assessment:     1. Sacroiliitis    2. Lumbar radiculopathy, chronic    3. Chronic pain of both knees    4. Polyarthralgia         A's of Opioid Risk Assessment  Activity:Patient can perform ADL.   Analgesia:Patients pain is partially controlled by current medication. Patient has tried OTC medications such as Tylenol and Ibuprofen with out relief.   Adverse Effects: Patient denies constipation or sedation.  Aberrant Behavior:  reviewed with no aberrant drug seeking/taking behavior.  Overdose reversal drug naloxone discussed     Drug screen reviewed    Plan:    Narcan January 2023    Follow-up after bilateral sacroiliac injection # 1, May 9, 2023, she states she had 60% relief after procedure, his procedure did help improve her level of function she would like to have next sacroiliac procedure for remaining pain     Would like to discontinue Tylenol 3 is not refill  Tylenol 3 since March 2023 would like to discuss options for better pain control with medication    Will try Butrans patch    MRI lumbar spine [facility] November 30, 2022 L3/4 far lateral disc bulge right greater than left multiple level degenerative changes     X-ray bilateral knees [facility] November 30, 2022 degenerative changes no fracture noted    X-ray lumbar spine [facility] November 30, 2022 degenerative changes no fracture noted    X-ray hips [facility] November 30, 2022 degenerative changes no fracture noted    Continue home exercise program as directed     Tender bilateral sacroiliac area  Bilateral sacroiliac compression test positive  Jose's /Luis's positive bilateral  Gaenslen positive bilateral    Ongoing Physical therapy/home exercise program as directed no longer controlling discomfort    Patient's pain medication no longer helping control discomfort    Schedule bilateral sacroiliac injection # 2, sacroiliitis    Dr. Walters

## 2023-05-22 NOTE — PROGRESS NOTES
Subjective:         Patient ID: Keshia Shepherd is a 38 y.o. female.    Chief Complaint: Low-back Pain and Knee Pain (Bilateral knee pain)        Pain  This is a chronic problem. The current episode started more than 1 year ago. The problem occurs daily. The problem has been waxing and waning. Associated symptoms include arthralgias, myalgias and neck pain. Pertinent negatives include no anorexia, chest pain, chills, coughing, diaphoresis, fever, sore throat, vertigo or vomiting.   Review of Systems   Constitutional:  Negative for activity change, appetite change, chills, diaphoresis, fever and unexpected weight change.   HENT:  Negative for drooling, ear discharge, ear pain, facial swelling, nosebleeds, sore throat, trouble swallowing, voice change and goiter.    Eyes:  Negative for photophobia, pain, discharge, redness and visual disturbance.   Respiratory:  Negative for apnea, cough, choking, chest tightness, shortness of breath, wheezing and stridor.    Cardiovascular:  Negative for chest pain, palpitations and leg swelling.   Gastrointestinal:  Negative for abdominal distention, anorexia, diarrhea, rectal pain, vomiting and fecal incontinence.   Endocrine: Negative for cold intolerance, heat intolerance, polydipsia, polyphagia and polyuria.   Genitourinary:  Negative for bladder incontinence, dysuria, flank pain, frequency, hematuria and hot flashes.   Musculoskeletal:  Positive for arthralgias, back pain, leg pain, myalgias, neck pain and neck stiffness. Negative for gait problem and joint deformity.   Integumentary:  Negative for color change and pallor.   Allergic/Immunologic: Negative for immunocompromised state.   Neurological:  Negative for dizziness, vertigo, seizures, syncope, facial asymmetry, speech difficulty, light-headedness, coordination difficulties, memory loss and coordination difficulties.   Hematological:  Negative for adenopathy. Does not bruise/bleed easily.   Psychiatric/Behavioral:   Negative for agitation, behavioral problems, confusion, decreased concentration, dysphoric mood, hallucinations, self-injury and suicidal ideas. The patient is not nervous/anxious and is not hyperactive.          Past Medical History:   Diagnosis Date    Asthma     Cervical radiculopathy     Chronic pain syndrome     Depressive disorder     Fibromyalgia     GERD (gastroesophageal reflux disease)     Hyperlipidemia     Osteoarthritis     Palpitations     Radiculopathy of cervical region      Past Surgical History:   Procedure Laterality Date    CARPAL TUNNEL RELEASE Bilateral     EPIDURAL STEROID INJECTION N/A 12/29/2022    Procedure: Injection, Steroid, Epidural, L3/4;  Surgeon: Vanessa Walters MD;  Location: [facility];  Service: Pain Management;  Laterality: N/A;    HYSTERECTOMY      INJECTION OF ANESTHETIC AGENT INTO SACROILIAC JOINT Bilateral 5/9/2023    Procedure: BLOCK, SACROILIAC JOINT;  Surgeon: Vanessa Walters MD;  Location: [facility];  Service: Pain Management;  Laterality: Bilateral;     Social History     Socioeconomic History    Marital status: Single   Tobacco Use    Smoking status: Never    Smokeless tobacco: Never   Substance and Sexual Activity    Alcohol use: Never    Drug use: Not Currently     Family History   Problem Relation Age of Onset    Arthritis Mother     Cancer Mother     Depression Mother     Diabetes Mother     Heart disease Mother     Hypertension Mother     Stroke Mother     Asthma Daughter     Arthritis Maternal Grandmother     Cancer Maternal Grandmother     Depression Maternal Grandmother     Diabetes Maternal Grandmother     Hypertension Maternal Grandmother     Heart disease Maternal Grandfather     Hypertension Maternal Grandfather     Cancer Paternal Grandmother     Depression Paternal Grandmother     Diabetes Paternal Grandmother      Review of patient's allergies indicates:   Allergen Reactions    Opioids - morphine analogues     Pcn [penicillins]        "  Objective:  Vitals:    05/23/23 1435   BP: 126/89   Pulse: 84   Resp: 18   Weight: 70.8 kg (156 lb)   Height: 5' 6" (1.676 m)   PainSc:   7         Physical Exam  Vitals and nursing note reviewed. Exam conducted with a chaperone present.   Constitutional:       General: She is awake. She is not in acute distress.     Appearance: Normal appearance. She is not ill-appearing, toxic-appearing or diaphoretic.   HENT:      Head: Normocephalic and atraumatic.      Nose: Nose normal.      Mouth/Throat:      Mouth: Mucous membranes are moist.      Pharynx: Oropharynx is clear.   Eyes:      Conjunctiva/sclera: Conjunctivae normal.      Pupils: Pupils are equal, round, and reactive to light.   Cardiovascular:      Rate and Rhythm: Normal rate.   Pulmonary:      Effort: Pulmonary effort is normal. No respiratory distress.   Abdominal:      Palpations: Abdomen is soft.   Musculoskeletal:         General: No deformity or signs of injury. Normal range of motion.      Cervical back: Normal range of motion and neck supple.   Skin:     General: Skin is warm and dry.   Neurological:      General: No focal deficit present.      Mental Status: She is alert and oriented to person, place, and time. Mental status is at baseline.      Cranial Nerves: No cranial nerve deficit (II-XII).   Psychiatric:         Mood and Affect: Mood normal.         Behavior: Behavior normal. Behavior is cooperative.         Thought Content: Thought content normal.         FL Fluoro for Pain Management  See OP Notes for results.     IMPRESSION: See OP Notes for results.     This procedure was auto-finalized by: Virtual Radiologist         Office Visit on 02/22/2023   Component Date Value Ref Range Status    POC Amphetamines 02/22/2023 Negative  Negative, Inconclusive Final    POC Barbiturates 02/22/2023 Negative  Negative, Inconclusive Final    POC Benzodiazepines 02/22/2023 Negative  Negative, Inconclusive Final    POC Cocaine 02/22/2023 Negative  Negative, " Inconclusive Final    POC THC 02/22/2023 Negative  Negative, Inconclusive Final    POC Methadone 02/22/2023 Negative  Negative, Inconclusive Final    POC Methamphetamine 02/22/2023 Negative  Negative, Inconclusive Final    POC Opiates 02/22/2023 Presumptive Positive (A)  Negative, Inconclusive Final    POC Oxycodone 02/22/2023 Negative  Negative, Inconclusive Final    POC Phencyclidine 02/22/2023 Negative  Negative, Inconclusive Final    POC Methylenedioxymethamphetamine * 02/22/2023 Negative  Negative, Inconclusive Final    POC Tricyclic Antidepressants 02/22/2023 Negative  Negative, Inconclusive Final    POC Buprenorphine 02/22/2023 Negative   Final     Acceptable 02/22/2023 Yes   Final    POC Temperature (Urine) 02/22/2023 94   Final   Office Visit on 01/23/2023   Component Date Value Ref Range Status    POC Amphetamines 01/23/2023 Negative  Negative, Inconclusive Final    POC Barbiturates 01/23/2023 Negative  Negative, Inconclusive Final    POC Benzodiazepines 01/23/2023 Negative  Negative, Inconclusive Final    POC Cocaine 01/23/2023 Negative  Negative, Inconclusive Final    POC THC 01/23/2023 Negative  Negative, Inconclusive Final    POC Methadone 01/23/2023 Negative  Negative, Inconclusive Final    POC Methamphetamine 01/23/2023 Negative  Negative, Inconclusive Final    POC Opiates 01/23/2023 Negative  Negative, Inconclusive Final    POC Oxycodone 01/23/2023 Negative  Negative, Inconclusive Final    POC Phencyclidine 01/23/2023 Negative  Negative, Inconclusive Final    POC Methylenedioxymethamphetamine * 01/23/2023 Negative  Negative, Inconclusive Final    POC Tricyclic Antidepressants 01/23/2023 Negative  Negative, Inconclusive Final    POC Buprenorphine 01/23/2023 Negative   Final     Acceptable 01/23/2023 Yes   Final    POC Temperature (Urine) 01/23/2023 90   Final   Lab Visit on 11/30/2022   Component Date Value Ref Range Status    YOHAN Screen 11/30/2022 Negative  Negative  Final    C3 11/30/2022 142  90 - 180 mg/dL Final    C4 11/30/2022 36  10 - 40 mg/dL Final    Complement, Total 11/30/2022 60  30 - 75 U/mL Final    CCP 11/30/2022 <16.0  <=19.9 units Final    Sodium 11/30/2022 139  136 - 145 mmol/L Final    Potassium 11/30/2022 4.1  3.5 - 5.1 mmol/L Final    Chloride 11/30/2022 105  98 - 107 mmol/L Final    CO2 11/30/2022 29  21 - 32 mmol/L Final    Anion Gap 11/30/2022 9  7 - 16 mmol/L Final    Glucose 11/30/2022 86  74 - 106 mg/dL Final    BUN 11/30/2022 9  7 - 18 mg/dL Final    Creatinine 11/30/2022 0.76  0.55 - 1.02 mg/dL Final    BUN/Creatinine Ratio 11/30/2022 12  6 - 20 Final    Calcium 11/30/2022 9.9  8.5 - 10.1 mg/dL Final    Total Protein 11/30/2022 8.0  6.4 - 8.2 g/dL Final    Albumin 11/30/2022 4.2  3.5 - 5.0 g/dL Final    Globulin 11/30/2022 3.8  2.0 - 4.0 g/dL Final    A/G Ratio 11/30/2022 1.1   Final    Bilirubin, Total 11/30/2022 0.3  >0.0 - 1.2 mg/dL Final    Alk Phos 11/30/2022 125 (H)  37 - 98 U/L Final    ALT 11/30/2022 24  13 - 56 U/L Final    AST 11/30/2022 12 (L)  15 - 37 U/L Final    eGFR 11/30/2022 104  >=60 mL/min/1.73m² Final    RA Titer 11/30/2022 <10  0 - 15 IU/mL Final    CRP 11/30/2022 <0.29  0.00 - 0.80 mg/dL Final    ESR Westergren 11/30/2022 9  0 - 20 mm/Hr Final    Syphilis Ab Interpretation 11/30/2022 Non-Reactive  Non-Reactive Final    Free T4 11/30/2022 0.94  0.76 - 1.46 ng/dL Final    TSH 11/30/2022 1.740  0.358 - 3.740 uIU/mL Final    Lupus Anticoagulant Tech Interp 11/30/2022 SEE COMMENTS   Final    Prothrombin Time (PT), P 11/30/2022 11.9  9.4 - 12.5 sec Final    INR 11/30/2022 1.1  0.9 - 1.1 Final    Activated Partial Thrombopl Time, P 11/30/2022 36  25 - 37 sec Final    DRVVT Screen Ratio 11/30/2022 0.92  <1.20 ratio Final    Vitamin D 25-Hydroxy, Blood 11/30/2022 47.2  ng/mL Final    Hepatitis C Ab 11/30/2022 Non-Reactive  Non-Reactive Final    Uric Acid 11/30/2022 3.3  2.6 - 6.0 mg/dL Final    Antistreptolysin O (ASO) 11/30/2022 76.0   0.0 - 408.0 IU/mL Final    Anti-dsDNA 11/30/2022 Negative  Negative Final    Anti-DSDNA (IU) 11/30/2022 3  0 - 24 IU Final    West Nile Virus Ab, IgG 11/30/2022 Negative  Negative Final    West Nile Virus Ab, IgM 11/30/2022 Negative  Negative Final    West Nile Serum Interpretation 11/30/2022 SEE COMMENTS   Final    Anaplasma phagocytophilum Ab, IgG,S 11/30/2022 <1:64  <1:64 titer Final    Ehrlichia Chaffeensis (HME) Ab, IgG 11/30/2022 <1:64  <1:64 titer Final    Spotted Fever Group Ab, IgG, S 11/30/2022 <1:64  <1:64 Final    Spotted Fever Group Ab, IgM, S 11/30/2022 <1:64  <1:64 Final    Lyme IgG/IgM 11/30/2022 Non-Reactive  Non-Reactive Final    CK 11/30/2022 114  26 - 192 U/L Final    Nil Result, TB 11/30/2022 0.05   Final    TB1 Ag minus Nil Result 11/30/2022 0.11   Final    TB2 Ag minus Nil Result 11/30/2022 0.07   Final    Mitogen minus Nil Result, TB 11/30/2022 >10.00   Final    QuantiFERON-Tb Gold Plus 11/30/2022 Negative  Negative Final    WBC 11/30/2022 3.43 (L)  4.50 - 11.00 K/uL Final    RBC 11/30/2022 5.04  4.20 - 5.40 M/uL Final    Hemoglobin 11/30/2022 11.7 (L)  12.0 - 16.0 g/dL Final    Hematocrit 11/30/2022 38.4  38.0 - 47.0 % Final    MCV 11/30/2022 76.2 (L)  80.0 - 96.0 fL Final    MCH 11/30/2022 23.2 (L)  27.0 - 31.0 pg Final    MCHC 11/30/2022 30.5 (L)  32.0 - 36.0 g/dL Final    RDW 11/30/2022 14.1  11.5 - 14.5 % Final    Platelet Count 11/30/2022 365  150 - 400 K/uL Final    MPV 11/30/2022 9.4  9.4 - 12.4 fL Final    Neutrophils % 11/30/2022 46.1 (L)  53.0 - 65.0 % Final    Lymphocytes % 11/30/2022 46.9 (H)  27.0 - 41.0 % Final    Monocytes % 11/30/2022 5.8  2.0 - 6.0 % Final    Eosinophils % 11/30/2022 0.9 (L)  1.0 - 4.0 % Final    Basophils % 11/30/2022 0.3  0.0 - 1.0 % Final    Immature Granulocytes % 11/30/2022 0.0  0.0 - 0.4 % Final    nRBC, Auto 11/30/2022 0.0  <=0.0 % Final    Neutrophils, Abs 11/30/2022 1.58 (L)  1.80 - 7.70 K/uL Final    Lymphocytes, Absolute 11/30/2022 1.61  1.00 -  4.80 K/uL Final    Monocytes, Absolute 11/30/2022 0.20  0.00 - 0.80 K/uL Final    Eosinophils, Absolute 11/30/2022 0.03  0.00 - 0.50 K/uL Final    Basophils, Absolute 11/30/2022 0.01  0.00 - 0.20 K/uL Final    Immature Granulocytes, Absolute 11/30/2022 0.00  0.00 - 0.04 K/uL Final    nRBC, Absolute 11/30/2022 0.00  <=0.00 x10e3/uL Final    Diff Type 11/30/2022 Auto   Final    HLA-B27 Result 11/30/2022 Negative  Not Applicable Final    Interpretation 11/30/2022 SEE COMMENTS   Final         Orders Placed This Encounter   Procedures    Case Request Operating Room: BLOCK, SACROILIAC JOINT     Order Specific Question:   Medical Necessity:     Answer:   Medically Non-Urgent [100]     Order Specific Question:   CPT Code:     Answer:   GA INJECTION,SACROILIAC JOINT [73152]     Order Specific Question:   Is an on-site pathologist required for this procedure?     Answer:   N/A         Requested Prescriptions     Signed Prescriptions Disp Refills    buprenorphine 5 mcg/hour (BUTRANS) weekly patch 4 patch 0     Sig: Place 1 patch onto the skin once a week.       Assessment:     1. Sacroiliitis    2. Lumbar radiculopathy, chronic    3. Chronic pain of both knees    4. Polyarthralgia         A's of Opioid Risk Assessment  Activity:Patient can perform ADL.   Analgesia:Patients pain is partially controlled by current medication. Patient has tried OTC medications such as Tylenol and Ibuprofen with out relief.   Adverse Effects: Patient denies constipation or sedation.  Aberrant Behavior:  reviewed with no aberrant drug seeking/taking behavior.  Overdose reversal drug naloxone discussed     Drug screen reviewed    Plan:    Narcan January 2023    Follow-up after bilateral sacroiliac injection # 1, May 9, 2023, she states she had 60% relief after procedure, his procedure did help improve her level of function she would like to have next sacroiliac procedure for remaining pain     Would like to discontinue Tylenol 3 is not refill  Tylenol 3 since March 2023 would like to discuss options for better pain control with medication    Will try Butrans patch    MRI lumbar spine [facility] November 30, 2022 L3/4 far lateral disc bulge right greater than left multiple level degenerative changes     X-ray bilateral knees [facility] November 30, 2022 degenerative changes no fracture noted    X-ray lumbar spine [facility] November 30, 2022 degenerative changes no fracture noted    X-ray hips [facility] November 30, 2022 degenerative changes no fracture noted    Continue home exercise program as directed     Tender bilateral sacroiliac area  Bilateral sacroiliac compression test positive  Jose's /Luis's positive bilateral  Gaenslen positive bilateral    Ongoing Physical therapy/home exercise program as directed no longer controlling discomfort    Patient's pain medication no longer helping control discomfort    Schedule bilateral sacroiliac injection # 2, sacroiliitis    Dr. Walters

## 2023-05-23 ENCOUNTER — OFFICE VISIT (OUTPATIENT)
Dept: PAIN MEDICINE | Facility: CLINIC | Age: 38
End: 2023-05-23
Payer: COMMERCIAL

## 2023-05-23 VITALS
DIASTOLIC BLOOD PRESSURE: 89 MMHG | BODY MASS INDEX: 25.07 KG/M2 | RESPIRATION RATE: 18 BRPM | HEIGHT: 66 IN | SYSTOLIC BLOOD PRESSURE: 126 MMHG | HEART RATE: 84 BPM | WEIGHT: 156 LBS

## 2023-05-23 DIAGNOSIS — M54.16 LUMBAR RADICULOPATHY, CHRONIC: Chronic | ICD-10-CM

## 2023-05-23 DIAGNOSIS — M25.562 CHRONIC PAIN OF BOTH KNEES: Chronic | ICD-10-CM

## 2023-05-23 DIAGNOSIS — M46.1 SACROILIITIS: Primary | Chronic | ICD-10-CM

## 2023-05-23 DIAGNOSIS — G89.29 CHRONIC PAIN OF BOTH KNEES: Chronic | ICD-10-CM

## 2023-05-23 DIAGNOSIS — M25.50 POLYARTHRALGIA: Chronic | ICD-10-CM

## 2023-05-23 DIAGNOSIS — M25.561 CHRONIC PAIN OF BOTH KNEES: Chronic | ICD-10-CM

## 2023-05-23 PROCEDURE — 3079F DIAST BP 80-89 MM HG: CPT | Mod: ,,, | Performed by: PHYSICIAN ASSISTANT

## 2023-05-23 PROCEDURE — 3079F PR MOST RECENT DIASTOLIC BLOOD PRESSURE 80-89 MM HG: ICD-10-PCS | Mod: ,,, | Performed by: PHYSICIAN ASSISTANT

## 2023-05-23 PROCEDURE — 3074F PR MOST RECENT SYSTOLIC BLOOD PRESSURE < 130 MM HG: ICD-10-PCS | Mod: ,,, | Performed by: PHYSICIAN ASSISTANT

## 2023-05-23 PROCEDURE — 1159F PR MEDICATION LIST DOCUMENTED IN MEDICAL RECORD: ICD-10-PCS | Mod: ,,, | Performed by: PHYSICIAN ASSISTANT

## 2023-05-23 PROCEDURE — 99214 OFFICE O/P EST MOD 30 MIN: CPT | Mod: S$PBB,,, | Performed by: PHYSICIAN ASSISTANT

## 2023-05-23 PROCEDURE — 99215 OFFICE O/P EST HI 40 MIN: CPT | Mod: PBBFAC | Performed by: PHYSICIAN ASSISTANT

## 2023-05-23 PROCEDURE — 3074F SYST BP LT 130 MM HG: CPT | Mod: ,,, | Performed by: PHYSICIAN ASSISTANT

## 2023-05-23 PROCEDURE — 3008F BODY MASS INDEX DOCD: CPT | Mod: ,,, | Performed by: PHYSICIAN ASSISTANT

## 2023-05-23 PROCEDURE — 3008F PR BODY MASS INDEX (BMI) DOCUMENTED: ICD-10-PCS | Mod: ,,, | Performed by: PHYSICIAN ASSISTANT

## 2023-05-23 PROCEDURE — 99214 PR OFFICE/OUTPT VISIT, EST, LEVL IV, 30-39 MIN: ICD-10-PCS | Mod: S$PBB,,, | Performed by: PHYSICIAN ASSISTANT

## 2023-05-23 PROCEDURE — 1159F MED LIST DOCD IN RCRD: CPT | Mod: ,,, | Performed by: PHYSICIAN ASSISTANT

## 2023-05-23 RX ORDER — BUPRENORPHINE 5 UG/H
1 PATCH TRANSDERMAL WEEKLY
Qty: 4 PATCH | Refills: 0 | Status: SHIPPED | OUTPATIENT
Start: 2023-05-23 | End: 2023-07-06 | Stop reason: SDUPTHER

## 2023-05-23 NOTE — PATIENT INSTRUCTIONS

## 2023-06-06 ENCOUNTER — ANESTHESIA EVENT (OUTPATIENT)
Dept: PAIN MEDICINE | Facility: HOSPITAL | Age: 38
End: 2023-06-06
Payer: COMMERCIAL

## 2023-06-06 ENCOUNTER — HOSPITAL ENCOUNTER (OUTPATIENT)
Facility: HOSPITAL | Age: 38
Discharge: HOME | End: 2023-06-06
Attending: PAIN MEDICINE | Admitting: PAIN MEDICINE
Payer: COMMERCIAL

## 2023-06-06 ENCOUNTER — ANESTHESIA (OUTPATIENT)
Dept: PAIN MEDICINE | Facility: HOSPITAL | Age: 38
End: 2023-06-06
Payer: COMMERCIAL

## 2023-06-06 VITALS
BODY MASS INDEX: 25.39 KG/M2 | SYSTOLIC BLOOD PRESSURE: 129 MMHG | WEIGHT: 158 LBS | OXYGEN SATURATION: 100 % | HEART RATE: 73 BPM | DIASTOLIC BLOOD PRESSURE: 67 MMHG | RESPIRATION RATE: 11 BRPM | TEMPERATURE: 98 F | HEIGHT: 66 IN

## 2023-06-06 DIAGNOSIS — M46.1 BILATERAL SACROILIITIS: ICD-10-CM

## 2023-06-06 DIAGNOSIS — M46.1 SACROILIITIS: Primary | Chronic | ICD-10-CM

## 2023-06-06 PROCEDURE — 37000008 HC ANESTHESIA 1ST 15 MINUTES: Performed by: PAIN MEDICINE

## 2023-06-06 PROCEDURE — 27096 INJECT SACROILIAC JOINT: CPT | Mod: 50,,, | Performed by: PAIN MEDICINE

## 2023-06-06 PROCEDURE — 63600175 PHARM REV CODE 636 W HCPCS: Performed by: PAIN MEDICINE

## 2023-06-06 PROCEDURE — D9220A PRA ANESTHESIA: Mod: ,,, | Performed by: NURSE ANESTHETIST, CERTIFIED REGISTERED

## 2023-06-06 PROCEDURE — 63600175 PHARM REV CODE 636 W HCPCS: Performed by: NURSE ANESTHETIST, CERTIFIED REGISTERED

## 2023-06-06 PROCEDURE — D9220A PRA ANESTHESIA: ICD-10-PCS | Mod: ,,, | Performed by: NURSE ANESTHETIST, CERTIFIED REGISTERED

## 2023-06-06 PROCEDURE — 37000009 HC ANESTHESIA EA ADD 15 MINS: Performed by: PAIN MEDICINE

## 2023-06-06 PROCEDURE — 27000284 HC CANNULA NASAL: Performed by: NURSE ANESTHETIST, CERTIFIED REGISTERED

## 2023-06-06 PROCEDURE — 25000003 PHARM REV CODE 250: Performed by: NURSE ANESTHETIST, CERTIFIED REGISTERED

## 2023-06-06 PROCEDURE — 25000003 PHARM REV CODE 250: Performed by: PAIN MEDICINE

## 2023-06-06 PROCEDURE — 27096 PR INJECTION,SACROILIAC JOINT: ICD-10-PCS | Mod: 50,,, | Performed by: PAIN MEDICINE

## 2023-06-06 PROCEDURE — 27096 INJECT SACROILIAC JOINT: CPT | Mod: RT | Performed by: PAIN MEDICINE

## 2023-06-06 RX ORDER — SODIUM CHLORIDE 9 MG/ML
INJECTION, SOLUTION INTRAVENOUS CONTINUOUS
Status: DISCONTINUED | OUTPATIENT
Start: 2023-06-06 | End: 2023-06-06 | Stop reason: HOSPADM

## 2023-06-06 RX ORDER — BUPIVACAINE HYDROCHLORIDE 2.5 MG/ML
INJECTION, SOLUTION EPIDURAL; INFILTRATION; INTRACAUDAL CODE/TRAUMA/SEDATION MEDICATION
Status: DISCONTINUED | OUTPATIENT
Start: 2023-06-06 | End: 2023-06-06 | Stop reason: HOSPADM

## 2023-06-06 RX ORDER — PROPOFOL 10 MG/ML
VIAL (ML) INTRAVENOUS
Status: DISCONTINUED | OUTPATIENT
Start: 2023-06-06 | End: 2023-06-06

## 2023-06-06 RX ORDER — TRIAMCINOLONE ACETONIDE 40 MG/ML
INJECTION, SUSPENSION INTRA-ARTICULAR; INTRAMUSCULAR CODE/TRAUMA/SEDATION MEDICATION
Status: DISCONTINUED | OUTPATIENT
Start: 2023-06-06 | End: 2023-06-06 | Stop reason: HOSPADM

## 2023-06-06 RX ORDER — LIDOCAINE HYDROCHLORIDE 20 MG/ML
INJECTION, SOLUTION EPIDURAL; INFILTRATION; INTRACAUDAL; PERINEURAL
Status: DISCONTINUED | OUTPATIENT
Start: 2023-06-06 | End: 2023-06-06

## 2023-06-06 RX ADMIN — PROPOFOL 50 MG: 10 INJECTION, EMULSION INTRAVENOUS at 03:06

## 2023-06-06 RX ADMIN — PROPOFOL 60 MG: 10 INJECTION, EMULSION INTRAVENOUS at 02:06

## 2023-06-06 RX ADMIN — LIDOCAINE HYDROCHLORIDE 60 MG: 20 INJECTION, SOLUTION INTRAVENOUS at 02:06

## 2023-06-06 RX ADMIN — SODIUM CHLORIDE: 9 INJECTION, SOLUTION INTRAVENOUS at 02:06

## 2023-06-06 RX ADMIN — PROPOFOL 50 MG: 10 INJECTION, EMULSION INTRAVENOUS at 02:06

## 2023-06-06 NOTE — PLAN OF CARE
Plan:  D/c pt via wheelchair at 1542    Informed pt if does not void in 8 hours to go to ER. Notify if redness, drainage, from injection site or fever over next 3-4 days. Rest and drink plenty of fluids for the remainder of the day. No lifting over 5 lbs. For the remainder of the day. Continue regular medications as prescribed. May take pain medications as prescribed.     Pain improved 100%

## 2023-06-06 NOTE — ANESTHESIA PREPROCEDURE EVALUATION
06/06/2023  Keshia Shepherd is a 38 y.o., female.  Past Medical History:   Diagnosis Date    Asthma     Cervical radiculopathy     Chronic pain syndrome     Depressive disorder     Fibromyalgia     GERD (gastroesophageal reflux disease)     Hyperlipidemia     Osteoarthritis     Palpitations     Radiculopathy of cervical region        Past Surgical History:   Procedure Laterality Date    CARPAL TUNNEL RELEASE Bilateral     EPIDURAL STEROID INJECTION N/A 12/29/2022    Procedure: Injection, Steroid, Epidural, L3/4;  Surgeon: Vanessa Walters MD;  Location: [facility];  Service: Pain Management;  Laterality: N/A;    HYSTERECTOMY      INJECTION OF ANESTHETIC AGENT INTO SACROILIAC JOINT Bilateral 5/9/2023    Procedure: BLOCK, SACROILIAC JOINT;  Surgeon: Vanessa Walters MD;  Location: [facility];  Service: Pain Management;  Laterality: Bilateral;       Family History   Problem Relation Age of Onset    Arthritis Mother     Cancer Mother     Depression Mother     Diabetes Mother     Heart disease Mother     Hypertension Mother     Stroke Mother     Asthma Daughter     Arthritis Maternal Grandmother     Cancer Maternal Grandmother     Depression Maternal Grandmother     Diabetes Maternal Grandmother     Hypertension Maternal Grandmother     Heart disease Maternal Grandfather     Hypertension Maternal Grandfather     Cancer Paternal Grandmother     Depression Paternal Grandmother     Diabetes Paternal Grandmother        Social History     Socioeconomic History    Marital status: Single   Tobacco Use    Smoking status: Never    Smokeless tobacco: Never   Substance and Sexual Activity    Alcohol use: Never    Drug use: Not Currently       Current Facility-Administered Medications   Medication Dose Route Frequency Provider Last Rate Last Admin    0.9%  NaCl infusion    Intravenous Continuous Vanessa Walters MD           Review of patient's allergies indicates:   Allergen Reactions    Opioids - morphine analogues     Pcn [penicillins]      Patient Active Problem List   Diagnosis    Chronic pain syndrome    Chronic bilateral low back pain without sciatica    Myalgia    Chronic pain of both knees    Radiculopathy of cervical region    Dysuria    Flank pain    Vaginal discharge    Acute cystitis without hematuria    H/O excision of epidermal inclusion cyst    Epidermal inclusion cyst    HSV (herpes simplex virus) infection    HPV (human papilloma virus) infection    Keloid of skin    Vaginal high risk HPV DNA test positive    Vitamin D deficiency    Polyarthralgia    Lumbosacral radiculopathy    Lumbar radiculopathy, chronic    Sacroiliitis         Pre-op Assessment    I have reviewed the Patient Summary Reports.     I have reviewed the Nursing Notes. I have reviewed the NPO Status.   I have reviewed the Medications.     Review of Systems  Anesthesia Hx:  No problems with previous Anesthesia    Pulmonary:   Asthma    Hepatic/GI:   GERD    Musculoskeletal:   Arthritis     Neurological:   Neuromuscular Disease,   Chronic Pain Syndrome   Psych:   Psychiatric History          Physical Exam  General: Well nourished, Alert, Oriented and Cooperative    Airway:  Mallampati: II   Mouth Opening: Normal  Neck ROM: Normal ROM    Dental:  Intact    Chest/Lungs:  Normal Respiratory Rate    Heart:  Rate: Normal        Anesthesia Plan  Type of Anesthesia, risks & benefits discussed:    Anesthesia Type: Gen Natural Airway, MAC  Intra-op Monitoring Plan: Standard ASA Monitors  Post Op Pain Control Plan: multimodal analgesia and IV/PO Opioids PRN  Induction:  IV  Informed Consent: Informed consent signed with the Patient and all parties understand the risks and agree with anesthesia plan.  All questions answered. Patient consented to blood products? Yes  ASA Score: 3  Day of  Surgery Review of History & Physical: I have interviewed and examined the patient. I have reviewed the patient's H&P dated: There are no significant changes.     Ready For Surgery From Anesthesia Perspective.     .

## 2023-06-06 NOTE — OP NOTE
Procedure Note    Procedure Date: 6/6/2023    Procedure Performed: Bilateral Sacroiliac joint injection, with Fluoroscopic Guidance    Indications: Patient has failed conservative therapy.      Pre-op diagnosis: Bilateral  Sacroiliitis    Post-op diagnosis: same    Physician: VAIBHAV Walters MD    Anesthesia: MAC    Medications injected: 0.25% Bupivacaine, Kenalog 40mg    Local anesthetic used: 1% Lidocaine, 2ml    Estimated Blood Loss: None    Complications:None    Technique:  The patient was interviewed in the holding area and Risks/Benefits were discussed, including, but not limited to, the possibility of new or different pain, bleeding or infection.   All questions were answered.  The patient understood and accepted risks.  Consent was reviewed and signed.   A time out was taken to identify the patient, procedure and side of the procedure.  The skin was prepped with chloroprep and sterile drapes were applied. The bilateral  SI joint was identified by fluoroscopy in an oblique plane. Skin and subcutaneous tissues overyling the target area was anesthetized with 1-2 mL of Lidocaine 1%.  A 22g 3 1/2 inch spinal needle was advanced under intermittent fluoroscopy until joint space was entered at the junction of the superior 2/3 & inferior 1/3 of the joint.  There was no paresthesia with needle placement. Aspiration was negative for blood. Next, a 2 cc solution from a mixture of 40 mg kenalog and 3mL of 0.25% Marcaine was injected without difficulty or resistance into each joint. The needle was removed and a sterile Band-Aid dressing was applied to the puncture site. The patient tolerated the procedure well.  The patient was monitored after the procedure.  The patient was given post procedure and discharge instructions to follow at home. The patient was discharged in a stable condition

## 2023-06-06 NOTE — BRIEF OP NOTE
Discharge Note  Short Stay    Admit Date: 6/6/2023    Discharge Date: 6/6/2023    Attending Physician: Vanessa Walters     Discharge Provider: Vanessa Walters    Diagnosis: Bilateral sacroiliitis    Discharged Condition: Good    Final Diagnoses: Sacroiliitis [M46.1]    Disposition: Home or Self Care    Hospital Course: No complications, uneventful    Outcome of Hospitalization, Treatment, Procedure, or Surgery:  Patient was admitted for outpatient interventional pain management procedure. The patient tolerated the procedure well with no complications.    Follow up/Patient Instructions:  Follow up as scheduled in Pain Management office in 3-4 weeks.  Patient has received instructions and follow up date and time.    Medications:  Continue previous medications

## 2023-06-06 NOTE — ANESTHESIA POSTPROCEDURE EVALUATION
Anesthesia Post Evaluation    Patient: Keshia Shepherd    Procedure(s) Performed: Procedure(s) (LRB):  BLOCK, SACROILIAC JOINT (Bilateral)    Final Anesthesia Type: general      Patient participation: Yes- Able to Participate  Level of consciousness: awake and alert  Post-procedure vital signs: reviewed and stable  Pain management: adequate  Airway patency: patent    PONV status at discharge: No PONV  Anesthetic complications: no      Cardiovascular status: blood pressure returned to baseline, hemodynamically stable and stable  Respiratory status: unassisted  Hydration status: euvolemic  Follow-up not needed.          Vitals Value Taken Time   /86 06/06/23 1512   Temp 36.7 °C (98 °F) 06/06/23 1506   Pulse 78 06/06/23 1514   Resp 16 06/06/23 1514   SpO2 98 % 06/06/23 1514   Vitals shown include unvalidated device data.      No case tracking events are documented in the log.      Pain/Jack Score: Jack Score: 7 (6/6/2023  3:05 PM)

## 2023-07-06 NOTE — PROGRESS NOTES
Subjective:         Patient ID: Keshia Shepherd is a 38 y.o. female.    Chief Complaint: Low-back Pain, Hip Pain, and Knee Pain        Pain  This is a chronic problem. The current episode started more than 1 year ago. The problem occurs daily. The problem has been unchanged. Associated symptoms include arthralgias, myalgias and neck pain. Pertinent negatives include no anorexia, chest pain, chills, coughing, diaphoresis, fever, sore throat, vertigo or vomiting.   Review of Systems   Constitutional:  Negative for activity change, appetite change, chills, diaphoresis, fever and unexpected weight change.   HENT:  Negative for drooling, ear discharge, ear pain, facial swelling, nosebleeds, sore throat, trouble swallowing, voice change and goiter.    Eyes:  Negative for photophobia, pain, discharge, redness and visual disturbance.   Respiratory:  Negative for apnea, cough, choking, chest tightness, shortness of breath, wheezing and stridor.    Cardiovascular:  Negative for chest pain, palpitations and leg swelling.   Gastrointestinal:  Negative for abdominal distention, anorexia, diarrhea, rectal pain, vomiting and fecal incontinence.   Endocrine: Negative for cold intolerance, heat intolerance, polydipsia, polyphagia and polyuria.   Genitourinary:  Negative for bladder incontinence, dysuria, flank pain, frequency, hematuria and hot flashes.   Musculoskeletal:  Positive for arthralgias, back pain, leg pain, myalgias, neck pain and neck stiffness. Negative for gait problem and joint deformity.   Integumentary:  Negative for color change and pallor.   Allergic/Immunologic: Negative for immunocompromised state.   Neurological:  Negative for dizziness, vertigo, seizures, syncope, facial asymmetry, speech difficulty, light-headedness, coordination difficulties, memory loss and coordination difficulties.   Hematological:  Negative for adenopathy. Does not bruise/bleed easily.   Psychiatric/Behavioral:  Negative for  agitation, behavioral problems, confusion, decreased concentration, dysphoric mood, hallucinations, self-injury and suicidal ideas. The patient is not nervous/anxious and is not hyperactive.          Past Medical History:   Diagnosis Date    Asthma     Cervical radiculopathy     Chronic pain syndrome     Depressive disorder     Fibromyalgia     GERD (gastroesophageal reflux disease)     Hyperlipidemia     Osteoarthritis     Palpitations     Radiculopathy of cervical region      Past Surgical History:   Procedure Laterality Date    CARPAL TUNNEL RELEASE Bilateral     EPIDURAL STEROID INJECTION N/A 12/29/2022    Procedure: Injection, Steroid, Epidural, L3/4;  Surgeon: Vanessa Walters MD;  Location: [facility];  Service: Pain Management;  Laterality: N/A;    HYSTERECTOMY      INJECTION OF ANESTHETIC AGENT INTO SACROILIAC JOINT Bilateral 5/9/2023    Procedure: BLOCK, SACROILIAC JOINT;  Surgeon: Vanessa Walters MD;  Location: [facility];  Service: Pain Management;  Laterality: Bilateral;    INJECTION OF ANESTHETIC AGENT INTO SACROILIAC JOINT Bilateral 6/6/2023    Procedure: BLOCK, SACROILIAC JOINT;  Surgeon: Vanessa Walters MD;  Location: [facility];  Service: Pain Management;  Laterality: Bilateral;     Social History     Socioeconomic History    Marital status: Single   Tobacco Use    Smoking status: Never    Smokeless tobacco: Never   Substance and Sexual Activity    Alcohol use: Never    Drug use: Not Currently     Family History   Problem Relation Age of Onset    Arthritis Mother     Cancer Mother     Depression Mother     Diabetes Mother     Heart disease Mother     Hypertension Mother     Stroke Mother     Asthma Daughter     Arthritis Maternal Grandmother     Cancer Maternal Grandmother     Depression Maternal Grandmother     Diabetes Maternal Grandmother     Hypertension Maternal Grandmother     Heart disease Maternal Grandfather     Hypertension Maternal Grandfather     Cancer  "Paternal Grandmother     Depression Paternal Grandmother     Diabetes Paternal Grandmother      Review of patient's allergies indicates:   Allergen Reactions    Opioids - morphine analogues     Pcn [penicillins]         Objective:  Vitals:    07/10/23 0950   BP: 131/70   Pulse: 72   Resp: 18   Weight: 71.7 kg (158 lb)   Height: 5' 6" (1.676 m)   PainSc:   8         Physical Exam  Vitals and nursing note reviewed. Exam conducted with a chaperone present.   Constitutional:       General: She is awake. She is not in acute distress.     Appearance: Normal appearance. She is not ill-appearing, toxic-appearing or diaphoretic.   HENT:      Head: Normocephalic and atraumatic.      Nose: Nose normal.      Mouth/Throat:      Mouth: Mucous membranes are moist.      Pharynx: Oropharynx is clear.   Eyes:      Conjunctiva/sclera: Conjunctivae normal.      Pupils: Pupils are equal, round, and reactive to light.   Cardiovascular:      Rate and Rhythm: Normal rate.   Pulmonary:      Effort: Pulmonary effort is normal. No respiratory distress.   Abdominal:      Palpations: Abdomen is soft.   Musculoskeletal:         General: No deformity or signs of injury. Normal range of motion.      Cervical back: Normal range of motion and neck supple.   Skin:     General: Skin is warm and dry.   Neurological:      General: No focal deficit present.      Mental Status: She is alert and oriented to person, place, and time. Mental status is at baseline.      Cranial Nerves: No cranial nerve deficit (II-XII).   Psychiatric:         Mood and Affect: Mood normal.         Behavior: Behavior normal. Behavior is cooperative.         Thought Content: Thought content normal.         FL Fluoro for Pain Management  See OP Notes for results.     IMPRESSION: See OP Notes for results.     This procedure was auto-finalized by: Virtual Radiologist         Office Visit on 02/22/2023   Component Date Value Ref Range Status    POC Amphetamines 02/22/2023 Negative  " Negative, Inconclusive Final    POC Barbiturates 02/22/2023 Negative  Negative, Inconclusive Final    POC Benzodiazepines 02/22/2023 Negative  Negative, Inconclusive Final    POC Cocaine 02/22/2023 Negative  Negative, Inconclusive Final    POC THC 02/22/2023 Negative  Negative, Inconclusive Final    POC Methadone 02/22/2023 Negative  Negative, Inconclusive Final    POC Methamphetamine 02/22/2023 Negative  Negative, Inconclusive Final    POC Opiates 02/22/2023 Presumptive Positive (A)  Negative, Inconclusive Final    POC Oxycodone 02/22/2023 Negative  Negative, Inconclusive Final    POC Phencyclidine 02/22/2023 Negative  Negative, Inconclusive Final    POC Methylenedioxymethamphetamine * 02/22/2023 Negative  Negative, Inconclusive Final    POC Tricyclic Antidepressants 02/22/2023 Negative  Negative, Inconclusive Final    POC Buprenorphine 02/22/2023 Negative   Final     Acceptable 02/22/2023 Yes   Final    POC Temperature (Urine) 02/22/2023 94   Final   Office Visit on 01/23/2023   Component Date Value Ref Range Status    POC Amphetamines 01/23/2023 Negative  Negative, Inconclusive Final    POC Barbiturates 01/23/2023 Negative  Negative, Inconclusive Final    POC Benzodiazepines 01/23/2023 Negative  Negative, Inconclusive Final    POC Cocaine 01/23/2023 Negative  Negative, Inconclusive Final    POC THC 01/23/2023 Negative  Negative, Inconclusive Final    POC Methadone 01/23/2023 Negative  Negative, Inconclusive Final    POC Methamphetamine 01/23/2023 Negative  Negative, Inconclusive Final    POC Opiates 01/23/2023 Negative  Negative, Inconclusive Final    POC Oxycodone 01/23/2023 Negative  Negative, Inconclusive Final    POC Phencyclidine 01/23/2023 Negative  Negative, Inconclusive Final    POC Methylenedioxymethamphetamine * 01/23/2023 Negative  Negative, Inconclusive Final    POC Tricyclic Antidepressants 01/23/2023 Negative  Negative, Inconclusive Final    POC Buprenorphine 01/23/2023 Negative    Final     Acceptable 01/23/2023 Yes   Final    POC Temperature (Urine) 01/23/2023 90   Final         Orders Placed This Encounter   Procedures    POCT Urine Drug Screen Presump     Interpretive Information:     Negative:  No drug detected at the cut off level.   Positive:  This result represents presumptive positive for the   tested drug, other substances may yield a positive response other   than the analyte of interest. This result should be utilized for   diagnostic purpose only. Confirmation testing will be performed upon physician request only.       Case Request Operating Room: RADIOFREQUENCY THERMAL COAGULATION SI     Order Specific Question:   Medical Necessity:     Answer:   Medically Non-Urgent [100]     Order Specific Question:   CPT Code:     Answer:   [place] ABLATION, RADIOFREQ, SACROILIAC JOINT, W/IMG [42613]     Order Specific Question:   Is an on-site pathologist required for this procedure?     Answer:   N/A         Requested Prescriptions     Signed Prescriptions Disp Refills    buprenorphine 5 mcg/hour (BUTRANS) weekly patch 4 patch 0     Sig: Place 1 patch onto the skin once a week.       Assessment:     1. Sacroiliitis    2. Lumbar radiculopathy, chronic    3. Chronic pain of both knees    4. Polyarthralgia    5. Encounter for long-term (current) drug use         A's of Opioid Risk Assessment  Activity:Patient can perform ADL.   Analgesia:Patients pain is partially controlled by current medication. Patient has tried OTC medications such as Tylenol and Ibuprofen with out relief.   Adverse Effects: Patient denies constipation or sedation.  Aberrant Behavior:  reviewed with no aberrant drug seeking/taking behavior.  Overdose reversal drug naloxone discussed     Drug screen reviewed    Plan:    Narcan January 2023    Presumptive drug screen negative, this is expected result, patient takes buprenorphine, cup does not test for buprenorphine    Follow-up after bilateral sacroiliac  injection # 2, June 6, 2023  she states she had 50% relief after procedure, his procedure did help improve her level of function     she would like to have next sacroiliac procedure for remaining pain     MRI lumbar spine [facility] November 30, 2022 L3/4 far lateral disc bulge right greater than left multiple level degenerative changes     X-ray bilateral knees [facility] November 30, 2022 degenerative changes no fracture noted    X-ray lumbar spine [facility] November 30, 2022 degenerative changes no fracture noted    X-ray hips [facility] November 30, 2022 degenerative changes no fracture noted    Continue home exercise program as directed     Tender bilateral sacroiliac area  Bilateral sacroiliac compression test positive  Jose's /Luis's positive bilateral  Gaenslen positive bilateral    Ongoing Physical therapy/home exercise program as directed no longer controlling discomfort    Patient's pain medication no longer helping control discomfort    Schedule bilateral sacroiliac RFTC, sacroiliitis    Dr. Walters

## 2023-07-10 ENCOUNTER — OFFICE VISIT (OUTPATIENT)
Dept: PAIN MEDICINE | Facility: CLINIC | Age: 38
End: 2023-07-10
Payer: COMMERCIAL

## 2023-07-10 VITALS
HEART RATE: 72 BPM | BODY MASS INDEX: 25.39 KG/M2 | DIASTOLIC BLOOD PRESSURE: 70 MMHG | RESPIRATION RATE: 18 BRPM | WEIGHT: 158 LBS | HEIGHT: 66 IN | SYSTOLIC BLOOD PRESSURE: 131 MMHG

## 2023-07-10 DIAGNOSIS — M46.1 SACROILIITIS: Primary | Chronic | ICD-10-CM

## 2023-07-10 DIAGNOSIS — M54.16 LUMBAR RADICULOPATHY, CHRONIC: Chronic | ICD-10-CM

## 2023-07-10 DIAGNOSIS — Z79.899 ENCOUNTER FOR LONG-TERM (CURRENT) DRUG USE: ICD-10-CM

## 2023-07-10 DIAGNOSIS — M25.562 CHRONIC PAIN OF BOTH KNEES: Chronic | ICD-10-CM

## 2023-07-10 DIAGNOSIS — M25.561 CHRONIC PAIN OF BOTH KNEES: Chronic | ICD-10-CM

## 2023-07-10 DIAGNOSIS — G89.29 CHRONIC PAIN OF BOTH KNEES: Chronic | ICD-10-CM

## 2023-07-10 DIAGNOSIS — M25.50 POLYARTHRALGIA: Chronic | ICD-10-CM

## 2023-07-10 PROCEDURE — 99214 PR OFFICE/OUTPT VISIT, EST, LEVL IV, 30-39 MIN: ICD-10-PCS | Mod: S$PBB,,, | Performed by: PHYSICIAN ASSISTANT

## 2023-07-10 PROCEDURE — 1159F MED LIST DOCD IN RCRD: CPT | Mod: ,,, | Performed by: PHYSICIAN ASSISTANT

## 2023-07-10 PROCEDURE — 3008F PR BODY MASS INDEX (BMI) DOCUMENTED: ICD-10-PCS | Mod: ,,, | Performed by: PHYSICIAN ASSISTANT

## 2023-07-10 PROCEDURE — 3008F BODY MASS INDEX DOCD: CPT | Mod: ,,, | Performed by: PHYSICIAN ASSISTANT

## 2023-07-10 PROCEDURE — 99215 OFFICE O/P EST HI 40 MIN: CPT | Mod: PBBFAC | Performed by: PHYSICIAN ASSISTANT

## 2023-07-10 PROCEDURE — 3075F PR MOST RECENT SYSTOLIC BLOOD PRESS GE 130-139MM HG: ICD-10-PCS | Mod: ,,, | Performed by: PHYSICIAN ASSISTANT

## 2023-07-10 PROCEDURE — 3078F PR MOST RECENT DIASTOLIC BLOOD PRESSURE < 80 MM HG: ICD-10-PCS | Mod: ,,, | Performed by: PHYSICIAN ASSISTANT

## 2023-07-10 PROCEDURE — 1159F PR MEDICATION LIST DOCUMENTED IN MEDICAL RECORD: ICD-10-PCS | Mod: ,,, | Performed by: PHYSICIAN ASSISTANT

## 2023-07-10 PROCEDURE — 99214 OFFICE O/P EST MOD 30 MIN: CPT | Mod: S$PBB,,, | Performed by: PHYSICIAN ASSISTANT

## 2023-07-10 PROCEDURE — 80305 DRUG TEST PRSMV DIR OPT OBS: CPT | Mod: PBBFAC | Performed by: PHYSICIAN ASSISTANT

## 2023-07-10 PROCEDURE — 3075F SYST BP GE 130 - 139MM HG: CPT | Mod: ,,, | Performed by: PHYSICIAN ASSISTANT

## 2023-07-10 PROCEDURE — 3078F DIAST BP <80 MM HG: CPT | Mod: ,,, | Performed by: PHYSICIAN ASSISTANT

## 2023-07-10 RX ORDER — BUPRENORPHINE 5 UG/H
1 PATCH TRANSDERMAL WEEKLY
Qty: 4 PATCH | Refills: 0 | Status: SHIPPED | OUTPATIENT
Start: 2023-07-10 | End: 2023-08-08 | Stop reason: SDUPTHER

## 2023-07-10 NOTE — PATIENT INSTRUCTIONS

## 2023-08-01 ENCOUNTER — HOSPITAL ENCOUNTER (OUTPATIENT)
Dept: RADIOLOGY | Facility: HOSPITAL | Age: 38
Discharge: HOME | End: 2023-08-01
Attending: NURSE PRACTITIONER
Payer: COMMERCIAL

## 2023-08-01 ENCOUNTER — CLINICAL SUPPORT (OUTPATIENT)
Dept: PRIMARY CARE CLINIC | Facility: CLINIC | Age: 38
End: 2023-08-01

## 2023-08-01 DIAGNOSIS — Z02.83 ENCOUNTER FOR DRUG SCREENING: ICD-10-CM

## 2023-08-01 DIAGNOSIS — Z11.1 SCREENING-PULMONARY TB: Primary | ICD-10-CM

## 2023-08-01 DIAGNOSIS — Z02.89 ENCOUNTER FOR PHYSICAL EXAMINATION RELATED TO EMPLOYMENT: ICD-10-CM

## 2023-08-01 DIAGNOSIS — Z11.1 SCREENING-PULMONARY TB: ICD-10-CM

## 2023-08-01 PROCEDURE — 99499 PR PHYSICAL - BASIC NON DOT/CDL: ICD-10-PCS | Mod: ,,, | Performed by: NURSE PRACTITIONER

## 2023-08-01 PROCEDURE — 99000 PR SPECIMEN HANDLING,DR OFF->LAB: ICD-10-PCS | Mod: ,,, | Performed by: NURSE PRACTITIONER

## 2023-08-01 PROCEDURE — 71045 XR CHEST 1 VIEW: ICD-10-PCS | Mod: 26,,, | Performed by: RADIOLOGY

## 2023-08-01 PROCEDURE — 99000 SPECIMEN HANDLING OFFICE-LAB: CPT | Mod: ,,, | Performed by: NURSE PRACTITIONER

## 2023-08-01 PROCEDURE — 99499 UNLISTED E&M SERVICE: CPT | Mod: ,,, | Performed by: NURSE PRACTITIONER

## 2023-08-01 PROCEDURE — 71045 X-RAY EXAM CHEST 1 VIEW: CPT | Mod: 26,,, | Performed by: RADIOLOGY

## 2023-08-01 PROCEDURE — 71045 X-RAY EXAM CHEST 1 VIEW: CPT | Mod: TC

## 2023-08-01 NOTE — PROGRESS NOTES
Subjective     Patient ID: Keshia Shepherd is a 38 y.o. female.    Chief Complaint: No chief complaint on file.    HPI  Review of Systems       Objective     Physical Exam       Assessment and Plan     1. Screening-pulmonary TB  -     X-Ray Chest 1 View; Future; Expected date: 08/01/2023    2. Encounter for drug screening    3. Encounter for physical examination related to employment        See scanned documents in .            No follow-ups on file.

## 2023-08-08 ENCOUNTER — OFFICE VISIT (OUTPATIENT)
Dept: PAIN MEDICINE | Facility: CLINIC | Age: 38
End: 2023-08-08
Payer: COMMERCIAL

## 2023-08-08 VITALS
DIASTOLIC BLOOD PRESSURE: 87 MMHG | RESPIRATION RATE: 18 BRPM | HEIGHT: 66 IN | HEART RATE: 87 BPM | SYSTOLIC BLOOD PRESSURE: 127 MMHG | WEIGHT: 163 LBS | BODY MASS INDEX: 26.2 KG/M2

## 2023-08-08 DIAGNOSIS — M25.561 CHRONIC PAIN OF BOTH KNEES: Chronic | ICD-10-CM

## 2023-08-08 DIAGNOSIS — M25.562 CHRONIC PAIN OF BOTH KNEES: Chronic | ICD-10-CM

## 2023-08-08 DIAGNOSIS — M54.16 LUMBAR RADICULOPATHY, CHRONIC: Primary | Chronic | ICD-10-CM

## 2023-08-08 DIAGNOSIS — M25.50 POLYARTHRALGIA: Chronic | ICD-10-CM

## 2023-08-08 DIAGNOSIS — M46.1 SACROILIITIS: Chronic | ICD-10-CM

## 2023-08-08 DIAGNOSIS — G89.29 CHRONIC PAIN OF BOTH KNEES: Chronic | ICD-10-CM

## 2023-08-08 PROCEDURE — 3079F PR MOST RECENT DIASTOLIC BLOOD PRESSURE 80-89 MM HG: ICD-10-PCS | Mod: ,,, | Performed by: PHYSICIAN ASSISTANT

## 2023-08-08 PROCEDURE — 99215 OFFICE O/P EST HI 40 MIN: CPT | Mod: PBBFAC,25 | Performed by: PHYSICIAN ASSISTANT

## 2023-08-08 PROCEDURE — 99214 PR OFFICE/OUTPT VISIT, EST, LEVL IV, 30-39 MIN: ICD-10-PCS | Mod: 25,S$PBB,, | Performed by: PHYSICIAN ASSISTANT

## 2023-08-08 PROCEDURE — 3079F DIAST BP 80-89 MM HG: CPT | Mod: ,,, | Performed by: PHYSICIAN ASSISTANT

## 2023-08-08 PROCEDURE — 20610 LARGE JOINT ASPIRATION/INJECTION: BILATERAL KNEE: ICD-10-PCS | Mod: 50,S$PBB,, | Performed by: PHYSICIAN ASSISTANT

## 2023-08-08 PROCEDURE — 1159F MED LIST DOCD IN RCRD: CPT | Mod: ,,, | Performed by: PHYSICIAN ASSISTANT

## 2023-08-08 PROCEDURE — 99214 OFFICE O/P EST MOD 30 MIN: CPT | Mod: 25,S$PBB,, | Performed by: PHYSICIAN ASSISTANT

## 2023-08-08 PROCEDURE — 3008F BODY MASS INDEX DOCD: CPT | Mod: ,,, | Performed by: PHYSICIAN ASSISTANT

## 2023-08-08 PROCEDURE — 3008F PR BODY MASS INDEX (BMI) DOCUMENTED: ICD-10-PCS | Mod: ,,, | Performed by: PHYSICIAN ASSISTANT

## 2023-08-08 PROCEDURE — 20610 DRAIN/INJ JOINT/BURSA W/O US: CPT | Mod: 50,PBBFAC | Performed by: PHYSICIAN ASSISTANT

## 2023-08-08 PROCEDURE — 1159F PR MEDICATION LIST DOCUMENTED IN MEDICAL RECORD: ICD-10-PCS | Mod: ,,, | Performed by: PHYSICIAN ASSISTANT

## 2023-08-08 PROCEDURE — 3074F PR MOST RECENT SYSTOLIC BLOOD PRESSURE < 130 MM HG: ICD-10-PCS | Mod: ,,, | Performed by: PHYSICIAN ASSISTANT

## 2023-08-08 PROCEDURE — 3074F SYST BP LT 130 MM HG: CPT | Mod: ,,, | Performed by: PHYSICIAN ASSISTANT

## 2023-08-08 RX ORDER — TRIAMCINOLONE ACETONIDE 40 MG/ML
20 INJECTION, SUSPENSION INTRA-ARTICULAR; INTRAMUSCULAR
Status: DISCONTINUED | OUTPATIENT
Start: 2023-08-08 | End: 2023-08-08 | Stop reason: HOSPADM

## 2023-08-08 RX ORDER — BUPRENORPHINE 5 UG/H
1 PATCH TRANSDERMAL WEEKLY
Qty: 4 PATCH | Refills: 0 | Status: SHIPPED | OUTPATIENT
Start: 2023-08-09 | End: 2023-11-02 | Stop reason: SDUPTHER

## 2023-08-08 RX ORDER — BUPIVACAINE HYDROCHLORIDE 2.5 MG/ML
2 INJECTION, SOLUTION EPIDURAL; INFILTRATION; INTRACAUDAL
Status: DISCONTINUED | OUTPATIENT
Start: 2023-08-08 | End: 2023-08-08 | Stop reason: HOSPADM

## 2023-08-08 RX ADMIN — TRIAMCINOLONE ACETONIDE 20 MG: 40 INJECTION, SUSPENSION INTRA-ARTICULAR; INTRAMUSCULAR at 02:08

## 2023-08-08 RX ADMIN — BUPIVACAINE HYDROCHLORIDE 2 ML: 2.5 INJECTION, SOLUTION EPIDURAL; INFILTRATION; INTRACAUDAL at 02:08

## 2023-08-08 NOTE — PROCEDURES
Large Joint Aspiration/Injection: bilateral knee    Date/Time: 8/8/2023 2:45 PM    Performed by: Perez Almonte PA  Authorized by: Perez Almonte PA    Consent Done?:  Yes (Written)  Indications:  Arthritis and pain  Site marked: the procedure site was marked    Timeout: prior to procedure the correct patient, procedure, and site was verified    Prep: patient was prepped and draped in usual sterile fashion      Details:  Needle Size:  22 G  Approach:  Lateral  Location:  Knee  Laterality:  Bilateral  Site:  Bilateral knee  Medications (Right):  2 mL BUPivacaine (PF) 0.25% (2.5 mg/ml) 0.25 % (2.5 mg/mL); 20 mg triamcinolone acetonide 40 mg/mL  Medications (Left):  2 mL BUPivacaine (PF) 0.25% (2.5 mg/ml) 0.25 % (2.5 mg/mL); 20 mg triamcinolone acetonide 40 mg/mL  Patient tolerance:  Patient tolerated the procedure well with no immediate complications     Bupivacaine 0.25% 25 mg/ 10 ml , 2 cc     Tolerated procedure well    No complication noted    Band-Aid was applied

## 2023-08-08 NOTE — PATIENT INSTRUCTIONS
Procedure Instructions:    Nothing to eat or drink for 8 hours or after midnight including gum, candy, mints, or tobacco products.  If you are scheduled for 1:30 or later nothing to eat or drink after 5 a.m. the morning of the procedure, including gum, candy, mints, or tobacco products.  Must have a  at least 18 yrs of age to stay present at all times  No Diabetic medications the morning of procedure, check blood sugar the morning of procedure, if it is greater than 200 call the office at [phone]  If you are started on antibiotics or have been prescribed antibiotics, have a fever, or have any other type of infection call to reschedule procedure.  If you take blood pressure medications you can take it at your regular scheduled time with a small sip of WATER!    HOLD ASPIRIN AND ASPIRIN PRODUCTS  (ASPIRIN, BC POWDER ETC. ) FOR 7 DAYS  PRIOR TO PROCEDURE  HOLD NSAIDS( ibuprofen, mobic, meloxicam, advil, diclofenac, naproxen, relafen, celebrex,  methotrexate, aleve etc....)  FOR 3 DAYS   PRIOR TO PROCEDURE

## 2023-08-08 NOTE — PROGRESS NOTES
Subjective:         Patient ID: Keshia Shepherd is a 38 y.o. female.    Chief Complaint: Low-back Pain and Knee Pain (Bilateral knee pain)        Pain  This is a chronic problem. The current episode started more than 1 year ago. The problem occurs daily. The problem has been waxing and waning. Associated symptoms include arthralgias, myalgias and neck pain. Pertinent negatives include no anorexia, chest pain, chills, coughing, diaphoresis, fever, sore throat, vertigo or vomiting.     Review of Systems   Constitutional:  Negative for activity change, appetite change, chills, diaphoresis, fever and unexpected weight change.   HENT:  Negative for drooling, ear discharge, ear pain, facial swelling, nosebleeds, sore throat, trouble swallowing, voice change and goiter.    Eyes:  Negative for photophobia, pain, discharge, redness and visual disturbance.   Respiratory:  Negative for apnea, cough, choking, chest tightness, shortness of breath, wheezing and stridor.    Cardiovascular:  Negative for chest pain, palpitations and leg swelling.   Gastrointestinal:  Negative for abdominal distention, anorexia, diarrhea, rectal pain, vomiting and fecal incontinence.   Endocrine: Negative for cold intolerance, heat intolerance, polydipsia, polyphagia and polyuria.   Genitourinary:  Negative for bladder incontinence, dysuria, flank pain, frequency, hematuria and hot flashes.   Musculoskeletal:  Positive for arthralgias, back pain, leg pain, myalgias, neck pain and neck stiffness. Negative for gait problem and joint deformity.   Integumentary:  Negative for color change and pallor.   Allergic/Immunologic: Negative for immunocompromised state.   Neurological:  Negative for dizziness, vertigo, seizures, syncope, facial asymmetry, speech difficulty, light-headedness, coordination difficulties, memory loss and coordination difficulties.   Hematological:  Negative for adenopathy. Does not bruise/bleed easily.   Psychiatric/Behavioral:   Negative for agitation, behavioral problems, confusion, decreased concentration, dysphoric mood, hallucinations, self-injury and suicidal ideas. The patient is not nervous/anxious and is not hyperactive.            Past Medical History:   Diagnosis Date    Asthma     Cervical radiculopathy     Chronic pain syndrome     Depressive disorder     Fibromyalgia     GERD (gastroesophageal reflux disease)     Hyperlipidemia     Osteoarthritis     Palpitations     Radiculopathy of cervical region      Past Surgical History:   Procedure Laterality Date    CARPAL TUNNEL RELEASE Bilateral     EPIDURAL STEROID INJECTION N/A 12/29/2022    Procedure: Injection, Steroid, Epidural, L3/4;  Surgeon: Vanessa Walters MD;  Location: [facility];  Service: Pain Management;  Laterality: N/A;    HYSTERECTOMY      INJECTION OF ANESTHETIC AGENT INTO SACROILIAC JOINT Bilateral 5/9/2023    Procedure: BLOCK, SACROILIAC JOINT;  Surgeon: Vanessa Walters MD;  Location: [facility];  Service: Pain Management;  Laterality: Bilateral;    INJECTION OF ANESTHETIC AGENT INTO SACROILIAC JOINT Bilateral 6/6/2023    Procedure: BLOCK, SACROILIAC JOINT;  Surgeon: Vanessa Walters MD;  Location: [facility];  Service: Pain Management;  Laterality: Bilateral;     Social History     Socioeconomic History    Marital status: Single   Tobacco Use    Smoking status: Never    Smokeless tobacco: Never   Substance and Sexual Activity    Alcohol use: Never    Drug use: Not Currently     Family History   Problem Relation Age of Onset    Arthritis Mother     Cancer Mother     Depression Mother     Diabetes Mother     Heart disease Mother     Hypertension Mother     Stroke Mother     Asthma Daughter     Arthritis Maternal Grandmother     Cancer Maternal Grandmother     Depression Maternal Grandmother     Diabetes Maternal Grandmother     Hypertension Maternal Grandmother     Heart disease Maternal Grandfather     Hypertension Maternal Grandfather      "Cancer Paternal Grandmother     Depression Paternal Grandmother     Diabetes Paternal Grandmother      Review of patient's allergies indicates:   Allergen Reactions    Opioids - morphine analogues     Pcn [penicillins]         Objective:  Vitals:    08/08/23 1434   BP: 130/80   Pulse: 87   Resp: 18   Weight: 73.9 kg (163 lb)   Height: 5' 6" (1.676 m)   PainSc:   8         Physical Exam  Vitals and nursing note reviewed. Exam conducted with a chaperone present.   Constitutional:       General: She is awake. She is not in acute distress.     Appearance: Normal appearance. She is not ill-appearing, toxic-appearing or diaphoretic.   HENT:      Head: Normocephalic and atraumatic.      Nose: Nose normal.      Mouth/Throat:      Mouth: Mucous membranes are moist.      Pharynx: Oropharynx is clear.   Eyes:      Conjunctiva/sclera: Conjunctivae normal.      Pupils: Pupils are equal, round, and reactive to light.   Cardiovascular:      Rate and Rhythm: Normal rate.   Pulmonary:      Effort: Pulmonary effort is normal. No respiratory distress.   Abdominal:      Palpations: Abdomen is soft.   Musculoskeletal:         General: No deformity or signs of injury. Normal range of motion.      Cervical back: Normal range of motion and neck supple.   Skin:     General: Skin is warm and dry.   Neurological:      General: No focal deficit present.      Mental Status: She is alert and oriented to person, place, and time. Mental status is at baseline.      Cranial Nerves: No cranial nerve deficit (II-XII).   Psychiatric:         Mood and Affect: Mood normal.         Behavior: Behavior normal. Behavior is cooperative.         Thought Content: Thought content normal.           X-Ray Chest 1 View  Narrative: EXAMINATION:  XR CHEST 1 VIEW    CLINICAL HISTORY:  .  Encounter for screening for respiratory tuberculosis    COMPARISON:  July 30, 2021    TECHNIQUE:  AP chest    FINDINGS:  The cardiomediastinal silhouette and pulmonary vasculature are " unremarkable.  Lungs and pleural spaces are clear.  There is no acute osseous abnormality  Impression: No acute cardiopulmonary process    Electronically signed by: Helio Layne  Date:    08/01/2023  Time:    14:01         Office Visit on 07/10/2023   Component Date Value Ref Range Status    POC Amphetamines 07/10/2023 Negative  Negative, Inconclusive Final    POC Barbiturates 07/10/2023 Negative  Negative, Inconclusive Final    POC Benzodiazepines 07/10/2023 Negative  Negative, Inconclusive Final    POC Cocaine 07/10/2023 Negative  Negative, Inconclusive Final    POC THC 07/10/2023 Negative  Negative, Inconclusive Final    POC Methadone 07/10/2023 Negative  Negative, Inconclusive Final    POC Methamphetamine 07/10/2023 Negative  Negative, Inconclusive Final    POC Opiates 07/10/2023 Negative  Negative, Inconclusive Final    POC Oxycodone 07/10/2023 Negative  Negative, Inconclusive Final    POC Phencyclidine 07/10/2023 Negative  Negative, Inconclusive Final    POC Methylenedioxymethamphetamine * 07/10/2023 Negative  Negative, Inconclusive Final    POC Tricyclic Antidepressants 07/10/2023 Negative  Negative, Inconclusive Final    POC Buprenorphine 07/10/2023 Negative   Final     Acceptable 07/10/2023 Yes   Final    POC Temperature (Urine) 07/10/2023 90   Final   Office Visit on 02/22/2023   Component Date Value Ref Range Status    POC Amphetamines 02/22/2023 Negative  Negative, Inconclusive Final    POC Barbiturates 02/22/2023 Negative  Negative, Inconclusive Final    POC Benzodiazepines 02/22/2023 Negative  Negative, Inconclusive Final    POC Cocaine 02/22/2023 Negative  Negative, Inconclusive Final    POC THC 02/22/2023 Negative  Negative, Inconclusive Final    POC Methadone 02/22/2023 Negative  Negative, Inconclusive Final    POC Methamphetamine 02/22/2023 Negative  Negative, Inconclusive Final    POC Opiates 02/22/2023 Presumptive Positive (A)  Negative, Inconclusive Final    POC Oxycodone  02/22/2023 Negative  Negative, Inconclusive Final    POC Phencyclidine 02/22/2023 Negative  Negative, Inconclusive Final    POC Methylenedioxymethamphetamine * 02/22/2023 Negative  Negative, Inconclusive Final    POC Tricyclic Antidepressants 02/22/2023 Negative  Negative, Inconclusive Final    POC Buprenorphine 02/22/2023 Negative   Final     Acceptable 02/22/2023 Yes   Final    POC Temperature (Urine) 02/22/2023 94   Final         Orders Placed This Encounter   Procedures    Large Joint Aspiration/Injection: bilateral knee     This order was created via procedure documentation    Case Request Operating Room: RADIOFREQUENCY THERMAL COAGULATION SI     Order Specific Question:   Medical Necessity:     Answer:   Medically Non-Urgent [100]     Order Specific Question:   CPT Code:     Answer:   IL ABLATION, RADIOFREQ, SACROILIAC JOINT, W/IMG [13682]     Order Specific Question:   Is an on-site pathologist required for this procedure?     Answer:   N/A         Requested Prescriptions     Signed Prescriptions Disp Refills    buprenorphine 5 mcg/hour (BUTRANS) weekly patch 4 patch 0     Sig: Place 1 patch onto the skin once a week.       Assessment:     1. Sacroiliitis    2. Lumbar radiculopathy, chronic    3. Chronic pain of both knees    4. Polyarthralgia         A's of Opioid Risk Assessment  Activity:Patient can perform ADL.   Analgesia:Patients pain is partially controlled by current medication. Patient has tried OTC medications such as Tylenol and Ibuprofen with out relief.   Adverse Effects: Patient denies constipation or sedation.  Aberrant Behavior:  reviewed with no aberrant drug seeking/taking behavior.  Overdose reversal drug naloxone discussed     Drug screen reviewed        Plan:    Narcan January 2023    Sacroiliac procedure scheduled for July 27, 2023 insurance company denied sacroiliac RF TC    She is requesting reschedule her procedure for back pain buttock and leg pain numbness and  tingling radicular in nature ongoing for more than 3 months    Complaining bilateral knee pain    Requesting bilateral knee injection she states she is had these injections in the past it does help with her discomfort    Requesting referral rheumatology     reviewed lab work drawn 2022 labs essentially normal no elevated markers      Presumptive drug screen negative, this is expected result, patient takes buprenorphine, cup does not test for buprenorphine    She had bilateral sacroiliac injection # 2, June 6, 2023  she states she had 50% relief after procedure, his procedure did help improve her level of function     She had lumbar L3/4 CARMEN # 1 December 29, 2022 she states she would 80% relief initially, maintain 50% relief with time, she states procedure did help improve her level function     MRI lumbar spine [facility] November 30, 2022 L3/4 far lateral disc bulge right greater than left multiple level degenerative changes     X-ray bilateral knees [facility] November 30, 2022 degenerative changes no fracture noted    X-ray lumbar spine [facility] November 30, 2022 degenerative changes no fracture noted    X-ray hips [facility] November 30, 2022 degenerative changes no fracture noted    Continue home exercise program as directed     Indications for this procedure for this specific patient include the following     - Injections being provided as part of a comprehensive pain management program.    - Pt has failed 6 weeks of conservative therapy including oral meds, PT and or home exercise program which has been discussed with the patient   - Injection being provided for suspected radicular pain.    - Pain scale of greater than or equal to 3/10 with functional impairment  - No evidence of local or systemic infection, bleeding tendency or unstable medical condition.    - Pain is causing significant functional limitation resulting in diminished quality of life and impaired age appropriate ADL's.   - Repeat  injections are done no sooner than 7 days after the previous injection  - Epidural done for suspected radicular pain along dermatome of nerve   - Epidural done to differentiate level of radicular nerve root pain   -procedure done prior to surgical consideration  - Repeat injections done only when pt reports 50% improvement in pain from previous injections    -increased level of function  - patient is aware if they take any NSAIDs/anticoagulant prior to procedure procedure will be postponed, this was listed on the preop instructions and highlighted, list of NSAIDs/anticoagulant reviewed with patient to include methotrexate  - Injection done at L3/4 level(s) which is consistent with patient's dermatomal pain complaint  The planned medically necessary  surgical procedure is performed in a hospital outpatient department and not in an ambulatory surgical center due to:     -there is no geographically assessable ambulatory surgery center that has the  necessary equipment and fluoroscopy needed for the procedure     -there is no geographically assessable ambulatory surgical center available at which the physician has privileges     -an ASC's  specific  guideline regarding the individuals weight or health conditions that prevent the use of an ASC     -done under fluoro  Monitor anesthesia request is medically indicated for the scheduled nerve block procedure due to:  1- needle phobia and anxiety, placing  the patient at risk during the provided service.  2-patient has an ASA class greater than 3 and requires constant presence of an anesthesiologist during the procedure,   3-patient has severe problems hard to lie still  4-patient suffers from chronic pain and is unable to function due to  diminished ADLs    Schedule lumbar L3/4 CARMEN # 2, lumbar radiculopathy    Dr. Walters

## 2023-08-22 ENCOUNTER — TELEPHONE (OUTPATIENT)
Dept: PAIN MEDICINE | Facility: CLINIC | Age: 38
End: 2023-08-22
Payer: COMMERCIAL

## 2023-08-22 NOTE — TELEPHONE ENCOUNTER
ELIZABETH BOLAÑOS   08/22/2023   2:24 PM   Notified patient that insurance denied procedure, canceled procedure.  ELIZABETH BOLAÑOS   08/22/2023   3:21 PM   Patient called, stated insurance told her they would cover a CARMEN procedure. RFTC changed to CARMEN L3-4. Scheduled for 9/5/23 at 2:30 pm.  Patient voiced understanding.

## 2023-09-05 ENCOUNTER — TELEPHONE (OUTPATIENT)
Dept: PAIN MEDICINE | Facility: CLINIC | Age: 38
End: 2023-09-05
Payer: COMMERCIAL

## 2023-11-02 NOTE — PROGRESS NOTES
Subjective:         Patient ID: Keshia Shephedr is a 38 y.o. female.    Chief Complaint: Shoulder Pain and Low-back Pain        Pain  This is a chronic problem. The current episode started more than 1 year ago. The problem occurs daily. The problem has been unchanged. Associated symptoms include arthralgias, myalgias and neck pain. Pertinent negatives include no anorexia, chest pain, chills, coughing, diaphoresis, fever, sore throat, vertigo or vomiting.     Review of Systems   Constitutional:  Negative for activity change, appetite change, chills, diaphoresis, fever and unexpected weight change.   HENT:  Negative for drooling, ear discharge, ear pain, facial swelling, nosebleeds, sore throat, trouble swallowing, voice change and goiter.    Eyes:  Negative for photophobia, pain, discharge, redness and visual disturbance.   Respiratory:  Negative for apnea, cough, choking, chest tightness, shortness of breath, wheezing and stridor.    Cardiovascular:  Negative for chest pain, palpitations and leg swelling.   Gastrointestinal:  Negative for abdominal distention, anorexia, diarrhea, rectal pain, vomiting and fecal incontinence.   Endocrine: Negative for cold intolerance, heat intolerance, polydipsia, polyphagia and polyuria.   Genitourinary:  Negative for bladder incontinence, dysuria, flank pain, frequency, hematuria and hot flashes.   Musculoskeletal:  Positive for arthralgias, back pain, leg pain, myalgias, neck pain and neck stiffness. Negative for gait problem and joint deformity.   Integumentary:  Negative for color change and pallor.   Allergic/Immunologic: Negative for immunocompromised state.   Neurological:  Negative for dizziness, vertigo, seizures, syncope, facial asymmetry, speech difficulty, light-headedness, memory loss and coordination difficulties.   Hematological:  Negative for adenopathy. Does not bruise/bleed easily.   Psychiatric/Behavioral:  Negative for agitation, behavioral problems,  confusion, decreased concentration, dysphoric mood, hallucinations, self-injury and suicidal ideas. The patient is not nervous/anxious and is not hyperactive.            Past Medical History:   Diagnosis Date    Asthma     Cervical radiculopathy     Chronic pain syndrome     Depressive disorder     Fibromyalgia     GERD (gastroesophageal reflux disease)     Hyperlipidemia     Osteoarthritis     Palpitations     Radiculopathy of cervical region      Past Surgical History:   Procedure Laterality Date    CARPAL TUNNEL RELEASE Bilateral     EPIDURAL STEROID INJECTION N/A 12/29/2022    Procedure: Injection, Steroid, Epidural, L3/4;  Surgeon: Vanessa Walters MD;  Location: [facility];  Service: Pain Management;  Laterality: N/A;    EPIDURAL STEROID INJECTION N/A 9/5/2023    Procedure: Injection, Steroid, Epidural, L3/4;  Surgeon: Vanessa Walters MD;  Location: [facility];  Service: Pain Management;  Laterality: N/A;    HYSTERECTOMY      INJECTION OF ANESTHETIC AGENT INTO SACROILIAC JOINT Bilateral 5/9/2023    Procedure: BLOCK, SACROILIAC JOINT;  Surgeon: Vanessa Walters MD;  Location: [facility];  Service: Pain Management;  Laterality: Bilateral;    INJECTION OF ANESTHETIC AGENT INTO SACROILIAC JOINT Bilateral 6/6/2023    Procedure: BLOCK, SACROILIAC JOINT;  Surgeon: Vanessa Walters MD;  Location: [facility];  Service: Pain Management;  Laterality: Bilateral;     Social History     Socioeconomic History    Marital status: Single   Tobacco Use    Smoking status: Never    Smokeless tobacco: Never   Substance and Sexual Activity    Alcohol use: Never    Drug use: Not Currently     Family History   Problem Relation Age of Onset    Arthritis Mother     Cancer Mother     Depression Mother     Diabetes Mother     Heart disease Mother     Hypertension Mother     Stroke Mother     Asthma Daughter     Arthritis Maternal Grandmother     Cancer Maternal Grandmother     Depression Maternal  "Grandmother     Diabetes Maternal Grandmother     Hypertension Maternal Grandmother     Heart disease Maternal Grandfather     Hypertension Maternal Grandfather     Cancer Paternal Grandmother     Depression Paternal Grandmother     Diabetes Paternal Grandmother      Review of patient's allergies indicates:   Allergen Reactions    Opioids - morphine analogues     Pcn [penicillins]     Pineapple Rash        Objective:  Vitals:    11/07/23 0904   BP: 126/60   Pulse: 74   Resp: 16   Weight: 68.9 kg (152 lb)   Height: 5' 6" (1.676 m)   PainSc:   8         Physical Exam  Vitals and nursing note reviewed. Exam conducted with a chaperone present.   Constitutional:       General: She is awake. She is not in acute distress.     Appearance: Normal appearance. She is not ill-appearing, toxic-appearing or diaphoretic.   HENT:      Head: Normocephalic and atraumatic.      Nose: Nose normal.      Mouth/Throat:      Mouth: Mucous membranes are moist.      Pharynx: Oropharynx is clear.   Eyes:      Conjunctiva/sclera: Conjunctivae normal.      Pupils: Pupils are equal, round, and reactive to light.   Cardiovascular:      Rate and Rhythm: Normal rate.   Pulmonary:      Effort: Pulmonary effort is normal. No respiratory distress.   Abdominal:      Palpations: Abdomen is soft.   Musculoskeletal:         General: No deformity or signs of injury. Normal range of motion.      Cervical back: Normal range of motion and neck supple.   Skin:     General: Skin is warm and dry.   Neurological:      General: No focal deficit present.      Mental Status: She is alert and oriented to person, place, and time. Mental status is at baseline.      Cranial Nerves: No cranial nerve deficit (II-XII).   Psychiatric:         Mood and Affect: Mood normal.         Behavior: Behavior normal. Behavior is cooperative.         Thought Content: Thought content normal.           X-Ray Chest 1 View  Narrative: EXAMINATION:  XR CHEST 1 VIEW    CLINICAL HISTORY:  .  " Encounter for screening for respiratory tuberculosis    COMPARISON:  July 30, 2021    TECHNIQUE:  AP chest    FINDINGS:  The cardiomediastinal silhouette and pulmonary vasculature are unremarkable.  Lungs and pleural spaces are clear.  There is no acute osseous abnormality  Impression: No acute cardiopulmonary process    Electronically signed by: Helio Layne  Date:    08/01/2023  Time:    14:01         Office Visit on 11/03/2023   Component Date Value Ref Range Status    POC Rapid COVID 11/03/2023 Negative  Negative Final     Acceptable 11/03/2023 Yes   Final    Rapid Influenza A Ag 11/03/2023 Negative  Negative Final    Rapid Influenza B Ag 11/03/2023 Negative  Negative Final     Acceptable 11/03/2023 Yes   Final    Color, UA 11/03/2023 Yellow   Final    Spec Grav UA 11/03/2023 1.030   Final    pH, UA 11/03/2023 6.5   Final    WBC, UA 11/03/2023 Negative   Final    Nitrite, UA 11/03/2023 Negative   Final    Protein, POC 11/03/2023 Negative   Final    Glucose, UA 11/03/2023 Negative   Final    Ketones, UA 11/03/2023 Negative   Final    Bilirubin, POC 11/03/2023 Negative   Final    Urobilinogen, UA 11/03/2023 0.2   Final    Blood, UA 11/03/2023 Negative   Final    POC Glucose 11/03/2023 131 (A)  70 - 110 MG/DL Final    Sodium 11/03/2023 141  136 - 145 mmol/L Final    Potassium 11/03/2023 3.9  3.5 - 5.1 mmol/L Final    Chloride 11/03/2023 107  98 - 107 mmol/L Final    CO2 11/03/2023 31  21 - 32 mmol/L Final    Anion Gap 11/03/2023 7  7 - 16 mmol/L Final    Glucose 11/03/2023 112 (H)  74 - 106 mg/dL Final    BUN 11/03/2023 7  7 - 18 mg/dL Final    Creatinine 11/03/2023 0.89  0.55 - 1.02 mg/dL Final    BUN/Creatinine Ratio 11/03/2023 8  6 - 20 Final    Calcium 11/03/2023 9.6  8.5 - 10.1 mg/dL Final    Total Protein 11/03/2023 8.0  6.4 - 8.2 g/dL Final    Albumin 11/03/2023 4.1  3.5 - 5.0 g/dL Final    Globulin 11/03/2023 3.9  2.0 - 4.0 g/dL Final    A/G Ratio 11/03/2023 1.1   Final     Bilirubin, Total 11/03/2023 0.1  >0.0 - 1.2 mg/dL Final    Alk Phos 11/03/2023 127 (H)  37 - 98 U/L Final    ALT 11/03/2023 20  13 - 56 U/L Final    AST 11/03/2023 8 (L)  15 - 37 U/L Final    eGFR 11/03/2023 85  >=60 mL/min/1.73m2 Final    Free T4 11/03/2023 0.87  0.76 - 1.46 ng/dL Final    TSH 11/03/2023 1.460  0.358 - 3.740 uIU/mL Final    Hemoglobin A1C 11/03/2023 5.8  4.5 - 6.6 % Final    Estimated Average Glucose 11/03/2023 107  mg/dL Final    Culture, Urine 11/03/2023 Skin/Urogenital Racheal Isolated, no further workup.   Final    WBC 11/03/2023 4.33 (L)  4.50 - 11.00 K/uL Final    RBC 11/03/2023 5.31  4.20 - 5.40 M/uL Final    Hemoglobin 11/03/2023 12.4  12.0 - 16.0 g/dL Final    Hematocrit 11/03/2023 41.3  38.0 - 47.0 % Final    MCV 11/03/2023 77.8 (L)  80.0 - 96.0 fL Final    MCH 11/03/2023 23.4 (L)  27.0 - 31.0 pg Final    MCHC 11/03/2023 30.0 (L)  32.0 - 36.0 g/dL Final    RDW 11/03/2023 14.1  11.5 - 14.5 % Final    Platelet Count 11/03/2023 342  150 - 400 K/uL Final    MPV 11/03/2023 9.9  9.4 - 12.4 fL Final    Neutrophils % 11/03/2023 45.8 (L)  53.0 - 65.0 % Final    Lymphocytes % 11/03/2023 46.4 (H)  27.0 - 41.0 % Final    Monocytes % 11/03/2023 6.5 (H)  2.0 - 6.0 % Final    Eosinophils % 11/03/2023 0.9 (L)  1.0 - 4.0 % Final    Basophils % 11/03/2023 0.2  0.0 - 1.0 % Final    Immature Granulocytes % 11/03/2023 0.2  0.0 - 0.4 % Final    nRBC, Auto 11/03/2023 0.0  <=0.0 % Final    Neutrophils, Abs 11/03/2023 1.98  1.80 - 7.70 K/uL Final    Lymphocytes, Absolute 11/03/2023 2.01  1.00 - 4.80 K/uL Final    Monocytes, Absolute 11/03/2023 0.28  0.00 - 0.80 K/uL Final    Eosinophils, Absolute 11/03/2023 0.04  0.00 - 0.50 K/uL Final    Basophils, Absolute 11/03/2023 0.01  0.00 - 0.20 K/uL Final    Immature Granulocytes, Absolute 11/03/2023 0.01  0.00 - 0.04 K/uL Final    nRBC, Absolute 11/03/2023 0.00  <=0.00 x10e3/uL Final    Diff Type 11/03/2023 Auto   Final   Office Visit on 07/10/2023   Component Date Value  Ref Range Status    POC Amphetamines 07/10/2023 Negative  Negative, Inconclusive Final    POC Barbiturates 07/10/2023 Negative  Negative, Inconclusive Final    POC Benzodiazepines 07/10/2023 Negative  Negative, Inconclusive Final    POC Cocaine 07/10/2023 Negative  Negative, Inconclusive Final    POC THC 07/10/2023 Negative  Negative, Inconclusive Final    POC Methadone 07/10/2023 Negative  Negative, Inconclusive Final    POC Methamphetamine 07/10/2023 Negative  Negative, Inconclusive Final    POC Opiates 07/10/2023 Negative  Negative, Inconclusive Final    POC Oxycodone 07/10/2023 Negative  Negative, Inconclusive Final    POC Phencyclidine 07/10/2023 Negative  Negative, Inconclusive Final    POC Methylenedioxymethamphetamine * 07/10/2023 Negative  Negative, Inconclusive Final    POC Tricyclic Antidepressants 07/10/2023 Negative  Negative, Inconclusive Final    POC Buprenorphine 07/10/2023 Negative   Final     Acceptable 07/10/2023 Yes   Final    POC Temperature (Urine) 07/10/2023 90   Final         No orders of the defined types were placed in this encounter.        Requested Prescriptions     Pending Prescriptions Disp Refills    buprenorphine 5 mcg/hour (BUTRANS) weekly patch 4 patch 0     Sig: Place 1 patch onto the skin once a week.       Assessment:     1. Lumbar radiculopathy, chronic    2. Chronic pain of both knees    3. Polyarthralgia    4. Sacroiliitis         A's of Opioid Risk Assessment  Activity:Patient can perform ADL.   Analgesia:Patients pain is partially controlled by current medication. Patient has tried OTC medications such as Tylenol and Ibuprofen with out relief.   Adverse Effects: Patient denies constipation or sedation.  Aberrant Behavior:  reviewed with no aberrant drug seeking/taking behavior.  Overdose reversal drug naloxone discussed     Drug screen reviewed        Plan:    Narcan January 2023    No show for lumbar L3/4 CARMEN # 2 September 5, 2023 November 7, 2023,  Presumptive drug screen positive for benzodiazepine will order definitive for clarification    Patient's medication is Butrans patch cup does not test for buprenorphine    November 9, 2023 definitive drug screen returned negative no benzodiazepine no buprenorphine  Last refill date buprenorphine patch August 9, 2023 drug screen should have been negative      Sacroiliac procedure scheduled for July 27, 2023 insurance company denied sacroiliac RF TC    She is requesting reschedule her procedure for back pain buttock and leg pain numbness and tingling radicular in nature ongoing for more than 3 months    Complaint right shoulder pain worse with range of motion ongoing for more than 3 months getting worse with time requesting further investigation    Complaining bilateral knee pain    Requesting bilateral knee injection she states she is had these injections in the past it does help with her discomfort    Presumptive drug screen negative, this is expected result, patient takes buprenorphine, cup does not test for buprenorphine    She had bilateral sacroiliac injection # 2, June 6, 2023  she states she had 50% relief after procedure, his procedure did help improve her level of function     She had lumbar L3/4 CARMEN # 1 December 29, 2022 she states she would 80% relief initially, maintain 50% relief with time, she states procedure did help improve her level function     MRI lumbar spine [facility] November 30, 2022 L3/4 far lateral disc bulge right greater than left multiple level degenerative changes     X-ray bilateral knees [facility] November 30, 2022 degenerative changes no fracture noted    X-ray lumbar spine [facility] November 30, 2022 degenerative changes no fracture noted    X-ray hips [facility] November 30, 2022 degenerative changes no fracture noted    Continue home exercise program as directed     Indications for this procedure for this specific patient include the following     - Injections being  provided as part of a comprehensive pain management program.    - Pt has failed 6 weeks of conservative therapy including oral meds, PT and or home exercise program which has been discussed with the patient   - Injection being provided for suspected radicular pain.    - Pain scale of greater than or equal to 3/10 with functional impairment  - No evidence of local or systemic infection, bleeding tendency or unstable medical condition.    - Pain is causing significant functional limitation resulting in diminished quality of life and impaired age appropriate ADL's.   - Repeat injections are done no sooner than 7 days after the previous injection  - Epidural done for suspected radicular pain along dermatome of nerve   - Epidural done to differentiate level of radicular nerve root pain   -procedure done prior to surgical consideration  - Repeat injections done only when pt reports 50% improvement in pain from previous injections    -increased level of function  - patient is aware if they take any NSAIDs/anticoagulant prior to procedure procedure will be postponed, this was listed on the preop instructions and highlighted, list of NSAIDs/anticoagulant reviewed with patient to include methotrexate  - Injection done at L3/4 level(s) which is consistent with patient's dermatomal pain complaint  The planned medically necessary  surgical procedure is performed in a hospital outpatient department and not in an ambulatory surgical center due to:     -there is no geographically assessable ambulatory surgery center that has the  necessary equipment and fluoroscopy needed for the procedure     -there is no geographically assessable ambulatory surgical center available at which the physician has privileges     -an ASC's  specific  guideline regarding the individuals weight or health conditions that prevent the use of an ASC     -done under fluoro  Monitor anesthesia request is medically indicated for the scheduled nerve block  procedure due to:  1- needle phobia and anxiety, placing  the patient at risk during the provided service.  2-patient has an ASA class greater than 3 and requires constant presence of an anesthesiologist during the procedure,   3-patient has severe problems hard to lie still  4-patient suffers from chronic pain and is unable to function due to  diminished ADLs    Schedule lumbar L3/4 CARMEN # 2, lumbar radiculopathy    Dr. Walters

## 2023-11-03 ENCOUNTER — OFFICE VISIT (OUTPATIENT)
Dept: FAMILY MEDICINE | Facility: CLINIC | Age: 38
End: 2023-11-03
Payer: COMMERCIAL

## 2023-11-03 VITALS
HEIGHT: 66 IN | DIASTOLIC BLOOD PRESSURE: 79 MMHG | RESPIRATION RATE: 19 BRPM | TEMPERATURE: 98 F | HEART RATE: 69 BPM | OXYGEN SATURATION: 100 % | SYSTOLIC BLOOD PRESSURE: 121 MMHG | BODY MASS INDEX: 24.85 KG/M2 | WEIGHT: 154.63 LBS

## 2023-11-03 DIAGNOSIS — Z80.0 FAMILY HISTORY OF COLON CANCER: ICD-10-CM

## 2023-11-03 DIAGNOSIS — Z13.1 DIABETES MELLITUS SCREENING: ICD-10-CM

## 2023-11-03 DIAGNOSIS — R53.83 FATIGUE, UNSPECIFIED TYPE: ICD-10-CM

## 2023-11-03 DIAGNOSIS — J06.9 UPPER RESPIRATORY TRACT INFECTION, UNSPECIFIED TYPE: Primary | ICD-10-CM

## 2023-11-03 DIAGNOSIS — R63.4 WEIGHT LOSS: ICD-10-CM

## 2023-11-03 DIAGNOSIS — R30.0 DYSURIA: ICD-10-CM

## 2023-11-03 DIAGNOSIS — Z11.59 ENCOUNTER FOR SCREENING FOR VIRAL DISEASE: ICD-10-CM

## 2023-11-03 LAB
ALBUMIN SERPL BCP-MCNC: 4.1 G/DL (ref 3.5–5)
ALBUMIN/GLOB SERPL: 1.1 {RATIO}
ALP SERPL-CCNC: 127 U/L (ref 37–98)
ALT SERPL W P-5'-P-CCNC: 20 U/L (ref 13–56)
ANION GAP SERPL CALCULATED.3IONS-SCNC: 7 MMOL/L (ref 7–16)
AST SERPL W P-5'-P-CCNC: 8 U/L (ref 15–37)
BASOPHILS # BLD AUTO: 0.01 K/UL (ref 0–0.2)
BASOPHILS NFR BLD AUTO: 0.2 % (ref 0–1)
BILIRUB SERPL-MCNC: 0.1 MG/DL (ref ?–1.2)
BILIRUB SERPL-MCNC: NEGATIVE MG/DL
BLOOD URINE, POC: NEGATIVE
BUN SERPL-MCNC: 7 MG/DL (ref 7–18)
BUN/CREAT SERPL: 8 (ref 6–20)
CALCIUM SERPL-MCNC: 9.6 MG/DL (ref 8.5–10.1)
CHLORIDE SERPL-SCNC: 107 MMOL/L (ref 98–107)
CO2 SERPL-SCNC: 31 MMOL/L (ref 21–32)
COLOR, POC UA: YELLOW
CREAT SERPL-MCNC: 0.89 MG/DL (ref 0.55–1.02)
CTP QC/QA: YES
CTP QC/QA: YES
DIFFERENTIAL METHOD BLD: ABNORMAL
EGFR (NO RACE VARIABLE) (RUSH/TITUS): 85 ML/MIN/1.73M2
EOSINOPHIL # BLD AUTO: 0.04 K/UL (ref 0–0.5)
EOSINOPHIL NFR BLD AUTO: 0.9 % (ref 1–4)
ERYTHROCYTE [DISTWIDTH] IN BLOOD BY AUTOMATED COUNT: 14.1 % (ref 11.5–14.5)
EST. AVERAGE GLUCOSE BLD GHB EST-MCNC: 107 MG/DL
FLUAV AG NPH QL: NEGATIVE
FLUBV AG NPH QL: NEGATIVE
GLOBULIN SER-MCNC: 3.9 G/DL (ref 2–4)
GLUCOSE SERPL-MCNC: 112 MG/DL (ref 74–106)
GLUCOSE SERPL-MCNC: 131 MG/DL (ref 70–110)
GLUCOSE UR QL STRIP: NEGATIVE
HBA1C MFR BLD HPLC: 5.8 % (ref 4.5–6.6)
HCT VFR BLD AUTO: 41.3 % (ref 38–47)
HGB BLD-MCNC: 12.4 G/DL (ref 12–16)
IMM GRANULOCYTES # BLD AUTO: 0.01 K/UL (ref 0–0.04)
IMM GRANULOCYTES NFR BLD: 0.2 % (ref 0–0.4)
KETONES UR QL STRIP: NEGATIVE
LEUKOCYTE ESTERASE URINE, POC: NEGATIVE
LYMPHOCYTES # BLD AUTO: 2.01 K/UL (ref 1–4.8)
LYMPHOCYTES NFR BLD AUTO: 46.4 % (ref 27–41)
MCH RBC QN AUTO: 23.4 PG (ref 27–31)
MCHC RBC AUTO-ENTMCNC: 30 G/DL (ref 32–36)
MCV RBC AUTO: 77.8 FL (ref 80–96)
MONOCYTES # BLD AUTO: 0.28 K/UL (ref 0–0.8)
MONOCYTES NFR BLD AUTO: 6.5 % (ref 2–6)
MPC BLD CALC-MCNC: 9.9 FL (ref 9.4–12.4)
NEUTROPHILS # BLD AUTO: 1.98 K/UL (ref 1.8–7.7)
NEUTROPHILS NFR BLD AUTO: 45.8 % (ref 53–65)
NITRITE, POC UA: NEGATIVE
NRBC # BLD AUTO: 0 X10E3/UL
NRBC, AUTO (.00): 0 %
PH, POC UA: 6.5
PLATELET # BLD AUTO: 342 K/UL (ref 150–400)
POTASSIUM SERPL-SCNC: 3.9 MMOL/L (ref 3.5–5.1)
PROT SERPL-MCNC: 8 G/DL (ref 6.4–8.2)
PROTEIN, POC: NEGATIVE
RBC # BLD AUTO: 5.31 M/UL (ref 4.2–5.4)
SARS-COV-2 RDRP RESP QL NAA+PROBE: NEGATIVE
SODIUM SERPL-SCNC: 141 MMOL/L (ref 136–145)
SPECIFIC GRAVITY, POC UA: 1.03
T4 FREE SERPL-MCNC: 0.87 NG/DL (ref 0.76–1.46)
TSH SERPL DL<=0.005 MIU/L-ACNC: 1.46 UIU/ML (ref 0.36–3.74)
UROBILINOGEN, POC UA: 0.2
WBC # BLD AUTO: 4.33 K/UL (ref 4.5–11)

## 2023-11-03 PROCEDURE — 80050 PR  GENERAL HEALTH PANEL: ICD-10-PCS | Mod: ,,, | Performed by: CLINICAL MEDICAL LABORATORY

## 2023-11-03 PROCEDURE — 81003 URINALYSIS AUTO W/O SCOPE: CPT | Mod: QW,,, | Performed by: NURSE PRACTITIONER

## 2023-11-03 PROCEDURE — 3008F BODY MASS INDEX DOCD: CPT | Mod: ,,, | Performed by: NURSE PRACTITIONER

## 2023-11-03 PROCEDURE — 3008F PR BODY MASS INDEX (BMI) DOCUMENTED: ICD-10-PCS | Mod: ,,, | Performed by: NURSE PRACTITIONER

## 2023-11-03 PROCEDURE — 87804 POCT INFLUENZA A/B: ICD-10-PCS | Mod: 59,QW,, | Performed by: NURSE PRACTITIONER

## 2023-11-03 PROCEDURE — 3078F PR MOST RECENT DIASTOLIC BLOOD PRESSURE < 80 MM HG: ICD-10-PCS | Mod: ,,, | Performed by: NURSE PRACTITIONER

## 2023-11-03 PROCEDURE — 83036 HEMOGLOBIN GLYCOSYLATED A1C: CPT | Mod: GZ,,, | Performed by: CLINICAL MEDICAL LABORATORY

## 2023-11-03 PROCEDURE — 3074F SYST BP LT 130 MM HG: CPT | Mod: ,,, | Performed by: NURSE PRACTITIONER

## 2023-11-03 PROCEDURE — 87635 SARS-COV-2 COVID-19 AMP PRB: CPT | Mod: QW,,, | Performed by: NURSE PRACTITIONER

## 2023-11-03 PROCEDURE — 99214 OFFICE O/P EST MOD 30 MIN: CPT | Mod: ,,, | Performed by: NURSE PRACTITIONER

## 2023-11-03 PROCEDURE — 87086 CULTURE, URINE: ICD-10-PCS | Mod: ,,, | Performed by: CLINICAL MEDICAL LABORATORY

## 2023-11-03 PROCEDURE — 1159F MED LIST DOCD IN RCRD: CPT | Mod: ,,, | Performed by: NURSE PRACTITIONER

## 2023-11-03 PROCEDURE — 87086 URINE CULTURE/COLONY COUNT: CPT | Mod: ,,, | Performed by: CLINICAL MEDICAL LABORATORY

## 2023-11-03 PROCEDURE — 1160F PR REVIEW ALL MEDS BY PRESCRIBER/CLIN PHARMACIST DOCUMENTED: ICD-10-PCS | Mod: ,,, | Performed by: NURSE PRACTITIONER

## 2023-11-03 PROCEDURE — 3078F DIAST BP <80 MM HG: CPT | Mod: ,,, | Performed by: NURSE PRACTITIONER

## 2023-11-03 PROCEDURE — 87635: ICD-10-PCS | Mod: QW,,, | Performed by: NURSE PRACTITIONER

## 2023-11-03 PROCEDURE — 99214 PR OFFICE/OUTPT VISIT, EST, LEVL IV, 30-39 MIN: ICD-10-PCS | Mod: ,,, | Performed by: NURSE PRACTITIONER

## 2023-11-03 PROCEDURE — 1159F PR MEDICATION LIST DOCUMENTED IN MEDICAL RECORD: ICD-10-PCS | Mod: ,,, | Performed by: NURSE PRACTITIONER

## 2023-11-03 PROCEDURE — 3074F PR MOST RECENT SYSTOLIC BLOOD PRESSURE < 130 MM HG: ICD-10-PCS | Mod: ,,, | Performed by: NURSE PRACTITIONER

## 2023-11-03 PROCEDURE — 84439 THYROID PANEL: ICD-10-PCS | Mod: ,,, | Performed by: CLINICAL MEDICAL LABORATORY

## 2023-11-03 PROCEDURE — 83036 HEMOGLOBIN A1C: ICD-10-PCS | Mod: GZ,,, | Performed by: CLINICAL MEDICAL LABORATORY

## 2023-11-03 PROCEDURE — 84439 ASSAY OF FREE THYROXINE: CPT | Mod: ,,, | Performed by: CLINICAL MEDICAL LABORATORY

## 2023-11-03 PROCEDURE — 80050 GENERAL HEALTH PANEL: CPT | Mod: ,,, | Performed by: CLINICAL MEDICAL LABORATORY

## 2023-11-03 PROCEDURE — 87804 INFLUENZA ASSAY W/OPTIC: CPT | Mod: 59,QW,, | Performed by: NURSE PRACTITIONER

## 2023-11-03 PROCEDURE — 1160F RVW MEDS BY RX/DR IN RCRD: CPT | Mod: ,,, | Performed by: NURSE PRACTITIONER

## 2023-11-03 PROCEDURE — 81003 POCT URINALYSIS W/O SCOPE: ICD-10-PCS | Mod: QW,,, | Performed by: NURSE PRACTITIONER

## 2023-11-03 RX ORDER — SULFAMETHOXAZOLE AND TRIMETHOPRIM 800; 160 MG/1; MG/1
1 TABLET ORAL 2 TIMES DAILY
Qty: 20 TABLET | Refills: 0 | Status: SHIPPED | OUTPATIENT
Start: 2023-11-03 | End: 2023-11-13

## 2023-11-03 RX ORDER — DEXCHLORPHENIRAMINE MALEATE, DEXTROMETHORPHAN HBR, PHENYLEPHRINE HCL 1; 10; 5 MG/5ML; MG/5ML; MG/5ML
10 SYRUP ORAL EVERY 6 HOURS PRN
Qty: 120 ML | Refills: 0 | Status: SHIPPED | OUTPATIENT
Start: 2023-11-03 | End: 2023-12-14

## 2023-11-03 NOTE — PROGRESS NOTES
"Subjective:       Patient ID: Keshia Shepherd is a 38 y.o. female.    Chief Complaint: Fatigue (X2 days), Generalized Body Aches (X2 days), Sinus Problem (States sinus drainage down throat. X2 days), Urinary Tract Infection (Lower ABD pain. States she constantly have the urge to urinate. Ongoing 1 week.), and Labs Only (States she would like a full work up to check her A1c)    Presents to clinic as above. No fever. Denies hx of diabetes. URI s/s for 2 days. No N/V/D. Also wants some basic labs. She has been feeling tired for a couple of weeks. She had a hysterectomy in the past. She has a family hx of colon cancer (mother at age 40) and breast cancer (grandmother at age 58). Denies blood in stool. No abd pain. Has lost 9 pounds since August. Has not changed diet.      Review of Systems   Constitutional:  Positive for malaise/fatigue. Negative for fever.   HENT:  Positive for congestion and sore throat.    Respiratory:  Positive for cough.    Gastrointestinal:  Positive for abdominal pain. Negative for diarrhea, nausea and vomiting.   Genitourinary:  Positive for dysuria, frequency and urgency.   Musculoskeletal:  Positive for myalgias.   Neurological:  Positive for headaches.          Reviewed family, medical, surgical, and social history.    Objective:      /79 (BP Location: Left arm, Patient Position: Sitting, BP Method: Large (Automatic))   Pulse 69   Temp 98.3 °F (36.8 °C) (Oral)   Resp 19   Ht 5' 6" (1.676 m)   Wt 70.1 kg (154 lb 9.6 oz)   SpO2 100%   BMI 24.95 kg/m²   Physical Exam  Vitals and nursing note reviewed.   Constitutional:       General: She is not in acute distress.     Appearance: Normal appearance. She is normal weight. She is not ill-appearing, toxic-appearing or diaphoretic.   HENT:      Head: Normocephalic.      Right Ear: Hearing, tympanic membrane, ear canal and external ear normal.      Left Ear: Hearing, tympanic membrane, ear canal and external ear normal.      Nose: Mucosal " edema, congestion and rhinorrhea present. Rhinorrhea is clear.      Right Turbinates: Enlarged and swollen.      Left Turbinates: Enlarged and swollen.      Right Sinus: No maxillary sinus tenderness or frontal sinus tenderness.      Left Sinus: No maxillary sinus tenderness or frontal sinus tenderness.      Mouth/Throat:      Lips: Pink.      Mouth: Mucous membranes are moist.      Pharynx: Uvula midline. Posterior oropharyngeal erythema present. No pharyngeal swelling, oropharyngeal exudate or uvula swelling.      Tonsils: No tonsillar exudate or tonsillar abscesses.   Cardiovascular:      Rate and Rhythm: Normal rate and regular rhythm.      Heart sounds: Normal heart sounds.   Pulmonary:      Effort: Pulmonary effort is normal.      Breath sounds: Normal breath sounds.   Abdominal:      General: Abdomen is flat. Bowel sounds are normal.      Palpations: Abdomen is soft.   Musculoskeletal:      Cervical back: Normal range of motion and neck supple. No rigidity or tenderness.   Lymphadenopathy:      Cervical: No cervical adenopathy.   Skin:     General: Skin is warm and dry.   Neurological:      Mental Status: She is alert.   Psychiatric:         Mood and Affect: Mood normal.         Behavior: Behavior normal.         Thought Content: Thought content normal.         Judgment: Judgment normal.            Office Visit on 11/03/2023   Component Date Value Ref Range Status    POC Glucose 11/03/2023 131 (A)  70 - 110 MG/DL Final      Assessment:       1. Upper respiratory tract infection, unspecified type    2. Encounter for screening for viral disease    3. Dysuria    4. Diabetes mellitus screening    5. Family history of colon cancer    6. Fatigue, unspecified type    7. Weight loss        Plan:       Upper respiratory tract infection, unspecified type  -     dexchlorphen-phenylephrine-DM (POLYTUSSIN DM,DEXCHLORPHENRMN,) 1-5-10 mg/5 mL Syrp; Take 10 mLs by mouth every 6 (six) hours as needed (cough/congestion).   Dispense: 120 mL; Refill: 0    Encounter for screening for viral disease  -     POCT COVID-19 Rapid Screening  -     POCT Influenza A/B    Dysuria  -     POCT URINALYSIS W/O SCOPE  -     sulfamethoxazole-trimethoprim 800-160mg (BACTRIM DS) 800-160 mg Tab; Take 1 tablet by mouth 2 (two) times daily. for 10 days  Dispense: 20 tablet; Refill: 0  -     Urine culture; Future; Expected date: 11/03/2023    Diabetes mellitus screening  -     POCT glucose  -     Hemoglobin A1C; Future; Expected date: 11/03/2023    Family history of colon cancer  -     Ambulatory referral/consult to Gastroenterology; Future; Expected date: 11/10/2023    Fatigue, unspecified type  -     Ambulatory referral/consult to Gastroenterology; Future; Expected date: 11/10/2023  -     CBC Auto Differential; Future; Expected date: 11/03/2023  -     Comprehensive Metabolic Panel; Future; Expected date: 11/03/2023  -     Thyroid Panel; Future; Expected date: 11/03/2023  -     Hemoglobin A1C; Future; Expected date: 11/03/2023    Weight loss  -     Hemoglobin A1C; Future; Expected date: 11/03/2023    I will call with lab results  F/U with us if you have not heard from GI within 1-2 weeks  RTC PRN          Risks, benefits, and side effects were discussed with the patient. All questions were answered to the fullest satisfaction of the patient, and pt verbalized understanding and agreement to treatment plan. Pt was to call with any new or worsening symptoms, or present to the ER.

## 2023-11-03 NOTE — PROGRESS NOTES
Notify of mildly elevated liver enzyme alk phos. This is chronic according to her chart but stable. Also mildly low WBC count. Could be a virus. Recheck in 4 weeks. No anemia.

## 2023-11-05 LAB — UA COMPLETE W REFLEX CULTURE PNL UR: NORMAL

## 2023-11-07 ENCOUNTER — OFFICE VISIT (OUTPATIENT)
Dept: PAIN MEDICINE | Facility: CLINIC | Age: 38
End: 2023-11-07
Payer: COMMERCIAL

## 2023-11-07 ENCOUNTER — HOSPITAL ENCOUNTER (OUTPATIENT)
Dept: RADIOLOGY | Facility: HOSPITAL | Age: 38
Discharge: HOME | End: 2023-11-07
Attending: PHYSICIAN ASSISTANT
Payer: COMMERCIAL

## 2023-11-07 VITALS
HEART RATE: 74 BPM | RESPIRATION RATE: 16 BRPM | WEIGHT: 152 LBS | DIASTOLIC BLOOD PRESSURE: 60 MMHG | SYSTOLIC BLOOD PRESSURE: 126 MMHG | HEIGHT: 66 IN | BODY MASS INDEX: 24.43 KG/M2

## 2023-11-07 DIAGNOSIS — M25.50 POLYARTHRALGIA: Chronic | ICD-10-CM

## 2023-11-07 DIAGNOSIS — M25.511 CHRONIC RIGHT SHOULDER PAIN: ICD-10-CM

## 2023-11-07 DIAGNOSIS — G89.29 CHRONIC RIGHT SHOULDER PAIN: ICD-10-CM

## 2023-11-07 DIAGNOSIS — G89.29 CHRONIC PAIN OF BOTH KNEES: Chronic | ICD-10-CM

## 2023-11-07 DIAGNOSIS — M54.16 LUMBAR RADICULOPATHY, CHRONIC: Primary | Chronic | ICD-10-CM

## 2023-11-07 DIAGNOSIS — M25.511 CHRONIC RIGHT SHOULDER PAIN: Chronic | ICD-10-CM

## 2023-11-07 DIAGNOSIS — Z79.899 ENCOUNTER FOR LONG-TERM (CURRENT) DRUG USE: ICD-10-CM

## 2023-11-07 DIAGNOSIS — G89.29 CHRONIC RIGHT SHOULDER PAIN: Chronic | ICD-10-CM

## 2023-11-07 DIAGNOSIS — M46.1 SACROILIITIS: Chronic | ICD-10-CM

## 2023-11-07 DIAGNOSIS — M25.562 CHRONIC PAIN OF BOTH KNEES: Chronic | ICD-10-CM

## 2023-11-07 DIAGNOSIS — M25.561 CHRONIC PAIN OF BOTH KNEES: Chronic | ICD-10-CM

## 2023-11-07 LAB
CTP QC/QA: YES
POC (AMP) AMPHETAMINE: NEGATIVE
POC (BAR) BARBITURATES: NEGATIVE
POC (BUP) BUPRENORPHINE: NEGATIVE
POC (BZO) BENZODIAZEPINES: ABNORMAL
POC (COC) COCAINE: NEGATIVE
POC (MDMA) METHYLENEDIOXYMETHAMPHETAMINE 3,4: NEGATIVE
POC (MET) METHAMPHETAMINE: NEGATIVE
POC (MOP) OPIATES: NEGATIVE
POC (MTD) METHADONE: NEGATIVE
POC (OXY) OXYCODONE: NEGATIVE
POC (PCP) PHENCYCLIDINE: NEGATIVE
POC (TCA) TRICYCLIC ANTIDEPRESSANTS: NEGATIVE
POC TEMPERATURE (URINE): 90
POC THC: NEGATIVE

## 2023-11-07 PROCEDURE — 3078F PR MOST RECENT DIASTOLIC BLOOD PRESSURE < 80 MM HG: ICD-10-PCS | Mod: CPTII,,, | Performed by: PHYSICIAN ASSISTANT

## 2023-11-07 PROCEDURE — 73030 X-RAY EXAM OF SHOULDER: CPT | Mod: TC,RT

## 2023-11-07 PROCEDURE — 80305 DRUG TEST PRSMV DIR OPT OBS: CPT | Mod: PBBFAC | Performed by: PHYSICIAN ASSISTANT

## 2023-11-07 PROCEDURE — 3044F HG A1C LEVEL LT 7.0%: CPT | Mod: CPTII,,, | Performed by: PHYSICIAN ASSISTANT

## 2023-11-07 PROCEDURE — 3044F PR MOST RECENT HEMOGLOBIN A1C LEVEL <7.0%: ICD-10-PCS | Mod: CPTII,,, | Performed by: PHYSICIAN ASSISTANT

## 2023-11-07 PROCEDURE — 3008F BODY MASS INDEX DOCD: CPT | Mod: CPTII,,, | Performed by: PHYSICIAN ASSISTANT

## 2023-11-07 PROCEDURE — 99999PBSHW POCT URINE DRUG SCREEN PRESUMP: ICD-10-PCS | Mod: PBBFAC,,,

## 2023-11-07 PROCEDURE — 73030 X-RAY EXAM OF SHOULDER: CPT | Mod: 26,RT,, | Performed by: RADIOLOGY

## 2023-11-07 PROCEDURE — 99999PBSHW POCT URINE DRUG SCREEN PRESUMP: Mod: PBBFAC,,,

## 2023-11-07 PROCEDURE — 3074F SYST BP LT 130 MM HG: CPT | Mod: CPTII,,, | Performed by: PHYSICIAN ASSISTANT

## 2023-11-07 PROCEDURE — 1159F MED LIST DOCD IN RCRD: CPT | Mod: CPTII,,, | Performed by: PHYSICIAN ASSISTANT

## 2023-11-07 PROCEDURE — G0481 DRUG SCREEN DEFINITIVE 14, URINE: ICD-10-PCS | Mod: ,,, | Performed by: CLINICAL MEDICAL LABORATORY

## 2023-11-07 PROCEDURE — 3008F PR BODY MASS INDEX (BMI) DOCUMENTED: ICD-10-PCS | Mod: CPTII,,, | Performed by: PHYSICIAN ASSISTANT

## 2023-11-07 PROCEDURE — 99214 OFFICE O/P EST MOD 30 MIN: CPT | Mod: S$PBB,,, | Performed by: PHYSICIAN ASSISTANT

## 2023-11-07 PROCEDURE — G0481 DRUG TEST DEF 8-14 CLASSES: HCPCS | Mod: ,,, | Performed by: CLINICAL MEDICAL LABORATORY

## 2023-11-07 PROCEDURE — 99214 PR OFFICE/OUTPT VISIT, EST, LEVL IV, 30-39 MIN: ICD-10-PCS | Mod: S$PBB,,, | Performed by: PHYSICIAN ASSISTANT

## 2023-11-07 PROCEDURE — 1159F PR MEDICATION LIST DOCUMENTED IN MEDICAL RECORD: ICD-10-PCS | Mod: CPTII,,, | Performed by: PHYSICIAN ASSISTANT

## 2023-11-07 PROCEDURE — 73030 XR SHOULDER COMPLETE 2 OR MORE VIEWS RIGHT: ICD-10-PCS | Mod: 26,RT,, | Performed by: RADIOLOGY

## 2023-11-07 PROCEDURE — 3074F PR MOST RECENT SYSTOLIC BLOOD PRESSURE < 130 MM HG: ICD-10-PCS | Mod: CPTII,,, | Performed by: PHYSICIAN ASSISTANT

## 2023-11-07 PROCEDURE — 3078F DIAST BP <80 MM HG: CPT | Mod: CPTII,,, | Performed by: PHYSICIAN ASSISTANT

## 2023-11-07 PROCEDURE — 99215 OFFICE O/P EST HI 40 MIN: CPT | Mod: PBBFAC | Performed by: PHYSICIAN ASSISTANT

## 2023-11-07 RX ORDER — BUPRENORPHINE 5 UG/H
1 PATCH TRANSDERMAL WEEKLY
Qty: 4 PATCH | Refills: 0 | Status: SHIPPED | OUTPATIENT
Start: 2023-11-07 | End: 2023-12-07 | Stop reason: SDUPTHER

## 2023-11-07 NOTE — PATIENT INSTRUCTIONS

## 2023-11-09 LAB
6-ACETYLMORPHINE, URINE (RUSH): NEGATIVE 10 NG/ML
7-AMINOCLONAZEPAM, URINE (RUSH): NEGATIVE 25 NG/ML
A-HYDROXYALPRAZOLAM, URINE (RUSH): NEGATIVE 25 NG/ML
ACETYL FENTANYL, URINE (RUSH): NEGATIVE 2.5 NG/ML
ACETYL NORFENTANYL OXALATE, URINE (RUSH): NEGATIVE 5 NG/ML
AMPHET UR QL SCN: NEGATIVE
BENZOYLECGONINE, URINE (RUSH): NEGATIVE 100 NG/ML
BUPRENORPHINE UR QL SCN: NEGATIVE 25 NG/ML
CODEINE, URINE (RUSH): NEGATIVE 25 NG/ML
CREAT UR-MCNC: 228 MG/DL (ref 28–219)
EDDP, URINE (RUSH): NEGATIVE 25 NG/ML
FENTANYL, URINE (RUSH): NEGATIVE 2.5 NG/ML
HYDROCODONE, URINE (RUSH): NEGATIVE 25 NG/ML
HYDROMORPHONE, URINE (RUSH): NEGATIVE 25 NG/ML
LORAZEPAM, URINE (RUSH): NEGATIVE 25 NG/ML
METHADONE UR QL SCN: NEGATIVE 25 NG/ML
METHAMPHET UR QL SCN: NEGATIVE
MORPHINE, URINE (RUSH): NEGATIVE 25 NG/ML
NORBUPRENORPHINE, URINE (RUSH): NEGATIVE 25 NG/ML
NORDIAZEPAM, URINE (RUSH): NEGATIVE 25 NG/ML
NORFENTANYL OXALATE, URINE (RUSH): NEGATIVE 5 NG/ML
NORHYDROCODONE, URINE (RUSH): NEGATIVE 50 NG/ML
NOROXYCODONE HCL, URINE (RUSH): NEGATIVE 50 NG/ML
OXAZEPAM, URINE (RUSH): NEGATIVE 25 NG/ML
OXYCODONE UR QL SCN: NEGATIVE 25 NG/ML
OXYMORPHONE, URINE (RUSH): NEGATIVE 25 NG/ML
PH UR STRIP: 6.5 PH UNITS
SP GR UR STRIP: 1.03
TAPENTADOL, URINE (RUSH): NEGATIVE 25 NG/ML
TEMAZEPAM, URINE (RUSH): NEGATIVE 25 NG/ML
THC-COOH, URINE (RUSH): NEGATIVE 25 NG/ML
TRAMADOL, URINE (RUSH): NEGATIVE 100 NG/ML

## 2023-11-27 ENCOUNTER — TELEPHONE (OUTPATIENT)
Dept: PAIN MEDICINE | Facility: CLINIC | Age: 38
End: 2023-11-27
Payer: COMMERCIAL

## 2023-11-27 NOTE — TELEPHONE ENCOUNTER
Patient has been informed that our office has not received approval as of today, 11/27/23 for her procedure-L3-4 CARMEN with Dr. Walters. Patient voices understanding.

## 2023-12-07 ENCOUNTER — TELEPHONE (OUTPATIENT)
Dept: PAIN MEDICINE | Facility: CLINIC | Age: 38
End: 2023-12-07
Payer: COMMERCIAL

## 2023-12-07 DIAGNOSIS — G89.29 CHRONIC PAIN OF BOTH KNEES: Chronic | ICD-10-CM

## 2023-12-07 DIAGNOSIS — M46.1 SACROILIITIS: Chronic | ICD-10-CM

## 2023-12-07 DIAGNOSIS — M25.562 CHRONIC PAIN OF BOTH KNEES: Chronic | ICD-10-CM

## 2023-12-07 DIAGNOSIS — M54.16 LUMBAR RADICULOPATHY, CHRONIC: Chronic | ICD-10-CM

## 2023-12-07 DIAGNOSIS — M25.50 POLYARTHRALGIA: Chronic | ICD-10-CM

## 2023-12-07 DIAGNOSIS — M25.561 CHRONIC PAIN OF BOTH KNEES: Chronic | ICD-10-CM

## 2023-12-07 NOTE — TELEPHONE ENCOUNTER
----- Message from Pop Bermudez LPN sent at 12/7/2023 11:45 AM CST -----  Regarding: FW: procedure    ----- Message -----  From: Marielena Black  Sent: 12/5/2023   1:33 PM CST  To: [facility] Staff  Subject: procedure                                        Pt states she has been approved to have a procedure between 11-28-23 or 12-28-23 please call pt back to get her scheduled for procedure at [phone]  ELIZABETH BOLAÑOS   12/07/2023   2:07 PM   Spoke with patient no appointment available until 1/9/24. Appoint made

## 2023-12-08 RX ORDER — BUPRENORPHINE 5 UG/H
1 PATCH TRANSDERMAL WEEKLY
Qty: 4 PATCH | Refills: 0 | Status: SHIPPED | OUTPATIENT
Start: 2023-12-08 | End: 2024-02-12

## 2023-12-14 ENCOUNTER — OFFICE VISIT (OUTPATIENT)
Dept: FAMILY MEDICINE | Facility: CLINIC | Age: 38
End: 2023-12-14
Payer: COMMERCIAL

## 2023-12-14 VITALS
WEIGHT: 130 LBS | RESPIRATION RATE: 18 BRPM | BODY MASS INDEX: 20.89 KG/M2 | TEMPERATURE: 98 F | SYSTOLIC BLOOD PRESSURE: 112 MMHG | OXYGEN SATURATION: 98 % | HEIGHT: 66 IN | DIASTOLIC BLOOD PRESSURE: 78 MMHG | HEART RATE: 64 BPM

## 2023-12-14 DIAGNOSIS — T14.8XXA BRUISING: ICD-10-CM

## 2023-12-14 DIAGNOSIS — R79.89 ABNORMAL CBC: Primary | ICD-10-CM

## 2023-12-14 DIAGNOSIS — L73.9 FOLLICULITIS: ICD-10-CM

## 2023-12-14 LAB
BASOPHILS # BLD AUTO: 0.01 K/UL (ref 0–0.2)
BASOPHILS NFR BLD AUTO: 0.2 % (ref 0–1)
DIFFERENTIAL METHOD BLD: ABNORMAL
EOSINOPHIL # BLD AUTO: 0.03 K/UL (ref 0–0.5)
EOSINOPHIL NFR BLD AUTO: 0.7 % (ref 1–4)
ERYTHROCYTE [DISTWIDTH] IN BLOOD BY AUTOMATED COUNT: 13.6 % (ref 11.5–14.5)
HCT VFR BLD AUTO: 39.4 % (ref 38–47)
HGB BLD-MCNC: 12 G/DL (ref 12–16)
HYPOCHROMIA BLD QL SMEAR: ABNORMAL
IMM GRANULOCYTES # BLD AUTO: 0.01 K/UL (ref 0–0.04)
IMM GRANULOCYTES NFR BLD: 0.2 % (ref 0–0.4)
IRON SATN MFR SERPL: 27 % (ref 14–50)
IRON SERPL-MCNC: 74 ΜG/DL (ref 50–170)
LYMPHOCYTES # BLD AUTO: 1.69 K/UL (ref 1–4.8)
LYMPHOCYTES NFR BLD AUTO: 37.3 % (ref 27–41)
MCH RBC QN AUTO: 22.8 PG (ref 27–31)
MCHC RBC AUTO-ENTMCNC: 30.5 G/DL (ref 32–36)
MCV RBC AUTO: 74.8 FL (ref 80–96)
MICROCYTES BLD QL SMEAR: ABNORMAL
MONOCYTES # BLD AUTO: 0.28 K/UL (ref 0–0.8)
MONOCYTES NFR BLD AUTO: 6.2 % (ref 2–6)
MPC BLD CALC-MCNC: 10 FL (ref 9.4–12.4)
NEUTROPHILS # BLD AUTO: 2.51 K/UL (ref 1.8–7.7)
NEUTROPHILS NFR BLD AUTO: 55.4 % (ref 53–65)
NRBC # BLD AUTO: 0 X10E3/UL
NRBC, AUTO (.00): 0 %
OVALOCYTES BLD QL SMEAR: ABNORMAL
PLATELET # BLD AUTO: 360 K/UL (ref 150–400)
PLATELET MORPHOLOGY: ABNORMAL
RBC # BLD AUTO: 5.27 M/UL (ref 4.2–5.4)
TIBC SERPL-MCNC: 279 ΜG/DL (ref 250–450)
WBC # BLD AUTO: 4.53 K/UL (ref 4.5–11)

## 2023-12-14 PROCEDURE — 3044F PR MOST RECENT HEMOGLOBIN A1C LEVEL <7.0%: ICD-10-PCS | Mod: CPTII,,, | Performed by: NURSE PRACTITIONER

## 2023-12-14 PROCEDURE — 3074F PR MOST RECENT SYSTOLIC BLOOD PRESSURE < 130 MM HG: ICD-10-PCS | Mod: CPTII,,, | Performed by: NURSE PRACTITIONER

## 2023-12-14 PROCEDURE — 85025 CBC WITH DIFFERENTIAL: ICD-10-PCS | Mod: ,,, | Performed by: CLINICAL MEDICAL LABORATORY

## 2023-12-14 PROCEDURE — 83550 IRON AND TIBC: ICD-10-PCS | Mod: ,,, | Performed by: CLINICAL MEDICAL LABORATORY

## 2023-12-14 PROCEDURE — 83550 IRON BINDING TEST: CPT | Mod: ,,, | Performed by: CLINICAL MEDICAL LABORATORY

## 2023-12-14 PROCEDURE — 3008F PR BODY MASS INDEX (BMI) DOCUMENTED: ICD-10-PCS | Mod: CPTII,,, | Performed by: NURSE PRACTITIONER

## 2023-12-14 PROCEDURE — 85025 COMPLETE CBC W/AUTO DIFF WBC: CPT | Mod: ,,, | Performed by: CLINICAL MEDICAL LABORATORY

## 2023-12-14 PROCEDURE — 3078F PR MOST RECENT DIASTOLIC BLOOD PRESSURE < 80 MM HG: ICD-10-PCS | Mod: CPTII,,, | Performed by: NURSE PRACTITIONER

## 2023-12-14 PROCEDURE — 3074F SYST BP LT 130 MM HG: CPT | Mod: CPTII,,, | Performed by: NURSE PRACTITIONER

## 2023-12-14 PROCEDURE — 3044F HG A1C LEVEL LT 7.0%: CPT | Mod: CPTII,,, | Performed by: NURSE PRACTITIONER

## 2023-12-14 PROCEDURE — 83540 IRON AND TIBC: ICD-10-PCS | Mod: ,,, | Performed by: CLINICAL MEDICAL LABORATORY

## 2023-12-14 PROCEDURE — 3078F DIAST BP <80 MM HG: CPT | Mod: CPTII,,, | Performed by: NURSE PRACTITIONER

## 2023-12-14 PROCEDURE — 3008F BODY MASS INDEX DOCD: CPT | Mod: CPTII,,, | Performed by: NURSE PRACTITIONER

## 2023-12-14 PROCEDURE — 99213 PR OFFICE/OUTPT VISIT, EST, LEVL III, 20-29 MIN: ICD-10-PCS | Mod: ,,, | Performed by: NURSE PRACTITIONER

## 2023-12-14 PROCEDURE — 1159F PR MEDICATION LIST DOCUMENTED IN MEDICAL RECORD: ICD-10-PCS | Mod: CPTII,,, | Performed by: NURSE PRACTITIONER

## 2023-12-14 PROCEDURE — 99213 OFFICE O/P EST LOW 20 MIN: CPT | Mod: ,,, | Performed by: NURSE PRACTITIONER

## 2023-12-14 PROCEDURE — 83540 ASSAY OF IRON: CPT | Mod: ,,, | Performed by: CLINICAL MEDICAL LABORATORY

## 2023-12-14 PROCEDURE — 1159F MED LIST DOCD IN RCRD: CPT | Mod: CPTII,,, | Performed by: NURSE PRACTITIONER

## 2023-12-14 RX ORDER — MUPIROCIN 20 MG/G
OINTMENT TOPICAL 3 TIMES DAILY
Qty: 22 G | Refills: 0 | Status: SHIPPED | OUTPATIENT
Start: 2023-12-14 | End: 2024-02-12

## 2023-12-14 RX ORDER — SULFAMETHOXAZOLE AND TRIMETHOPRIM 800; 160 MG/1; MG/1
1 TABLET ORAL 2 TIMES DAILY
Qty: 14 TABLET | Refills: 0 | Status: SHIPPED | OUTPATIENT
Start: 2023-12-14 | End: 2023-12-21

## 2023-12-14 NOTE — LETTER
December 14, 2023      [facility]  [address] [facility] [place] [address]  Phone: [phone]  Fax: [phone]       Patient: Keshia Shepherd   YOB: 1985  Date of Visit: 12/14/2023    To Whom It May Concern:    Taylor Shepherd  was at [facility] on 12/14/2023. The patient may return to work/school on 12/15/2023 with no restrictions. If you have any questions or concerns, or if I can be of further assistance, please do not hesitate to contact me.    Sincerely,    MILY Young

## 2023-12-14 NOTE — PROGRESS NOTES
Subjective:       Patient ID: Keshia Shepherd is a 38 y.o. female.    Chief Complaint: Cyst (Knot on private area. Pt states she wake up with bruises all over the body. )    Suprapubic bump and bruising- reviewed previous labs and her RBC indices are low- will check TIBC and iron today and repeat CBC    Review of Systems   Constitutional:  Negative for appetite change, chills, fatigue and fever.   HENT:  Negative for nasal congestion, ear pain and sore throat.    Eyes:  Negative for pain, discharge and itching.   Respiratory:  Negative for cough and shortness of breath.    Cardiovascular:  Negative for chest pain and leg swelling.   Gastrointestinal:  Negative for abdominal pain, change in bowel habit, nausea and vomiting.   Genitourinary:  Negative for dysuria, flank pain, frequency, genital sores and urgency.   Musculoskeletal:  Negative for back pain, gait problem and neck pain.   Integumentary:  Positive for wound. Negative for rash.   Allergic/Immunologic: Negative for immunocompromised state.   Neurological:  Negative for dizziness, weakness and headaches.   Hematological:  Bruises/bleeds easily.   All other systems reviewed and are negative.        Objective:      Physical Exam  Constitutional:       General: She is not in acute distress.     Appearance: Normal appearance. She is not ill-appearing, toxic-appearing or diaphoretic.   Eyes:      Pupils: Pupils are equal, round, and reactive to light.   Cardiovascular:      Rate and Rhythm: Normal rate and regular rhythm.      Pulses: Normal pulses.      Heart sounds: Normal heart sounds. No murmur heard.  Pulmonary:      Effort: Pulmonary effort is normal.      Breath sounds: Normal breath sounds. No wheezing, rhonchi or rales.   Abdominal:      General: Bowel sounds are normal.      Palpations: Abdomen is soft.      Tenderness: There is no abdominal tenderness. There is no right CVA tenderness, left CVA tenderness, guarding or rebound.   Musculoskeletal:          General: Normal range of motion.   Skin:     General: Skin is warm and dry.      Findings: Abscess and bruising (minor noted to the right lateral lower leg) present.             Comments: A .5 cm raised, non fluctuant abscess noted to suprapubic    Neurological:      Mental Status: She is alert and oriented to person, place, and time.   Psychiatric:         Mood and Affect: Mood normal.         Behavior: Behavior normal.            Assessment:       1. Abnormal CBC    2. Folliculitis    3. Bruising        Plan:   Abnormal CBC  -     Iron and TIBC; Future; Expected date: 12/14/2023  -     CBC Auto Differential; Future; Expected date: 12/14/2023    Folliculitis  -     sulfamethoxazole-trimethoprim 800-160mg (BACTRIM DS) 800-160 mg Tab; Take 1 tablet by mouth 2 (two) times daily. for 7 days  Dispense: 14 tablet; Refill: 0  -     mupirocin (BACTROBAN) 2 % ointment; Apply topically 3 (three) times daily.  Dispense: 22 g; Refill: 0    Bruising         Risks, benefits, and side effects were discussed with the patient. All questions were answered to the fullest satisfaction of the patient, and pt verbalized understanding and agreement to treatment plan. Pt was to call with any new or worsening symptoms, or present to the ER

## 2024-01-08 RX ORDER — BUPRENORPHINE 5 UG/H
1 PATCH TRANSDERMAL WEEKLY
Qty: 4 PATCH | Refills: 0 | Status: CANCELLED | OUTPATIENT
Start: 2024-01-08

## 2024-01-08 NOTE — H&P (VIEW-ONLY)
Subjective:         Patient ID: Keshia Shepherd is a 38 y.o. female.    Chief Complaint: Shoulder Pain and Back Pain (Right )        Pain  This is a chronic problem. The current episode started more than 1 year ago. The problem occurs daily. The problem has been waxing and waning. Associated symptoms include arthralgias, myalgias and neck pain. Pertinent negatives include no anorexia, chest pain, chills, coughing, diaphoresis, fever, sore throat, vertigo or vomiting.     Review of Systems   Constitutional:  Negative for activity change, appetite change, chills, diaphoresis, fever and unexpected weight change.   HENT:  Negative for drooling, ear discharge, ear pain, facial swelling, nosebleeds, sore throat, trouble swallowing, voice change and goiter.    Eyes:  Negative for photophobia, pain, discharge, redness and visual disturbance.   Respiratory:  Negative for apnea, cough, choking, chest tightness, shortness of breath, wheezing and stridor.    Cardiovascular:  Negative for chest pain, palpitations and leg swelling.   Gastrointestinal:  Negative for abdominal distention, anorexia, diarrhea, rectal pain, vomiting and fecal incontinence.   Endocrine: Negative for cold intolerance, heat intolerance, polydipsia, polyphagia and polyuria.   Genitourinary:  Negative for bladder incontinence, dysuria, flank pain, frequency, hematuria and hot flashes.   Musculoskeletal:  Positive for arthralgias, back pain, leg pain, myalgias, neck pain and neck stiffness. Negative for gait problem and joint deformity.   Integumentary:  Negative for color change and pallor.   Allergic/Immunologic: Negative for immunocompromised state.   Neurological:  Negative for dizziness, vertigo, seizures, syncope, facial asymmetry, speech difficulty, light-headedness, memory loss and coordination difficulties.   Hematological:  Negative for adenopathy. Does not bruise/bleed easily.   Psychiatric/Behavioral:  Negative for agitation, behavioral  problems, confusion, decreased concentration, dysphoric mood, hallucinations, self-injury and suicidal ideas. The patient is not nervous/anxious and is not hyperactive.            Past Medical History:   Diagnosis Date    Asthma     Cervical radiculopathy     Chronic pain syndrome     Depressive disorder     Fibromyalgia     GERD (gastroesophageal reflux disease)     Hyperlipidemia     Osteoarthritis     Palpitations     Radiculopathy of cervical region      Past Surgical History:   Procedure Laterality Date    CARPAL TUNNEL RELEASE Bilateral     EPIDURAL STEROID INJECTION N/A 12/29/2022    Procedure: Injection, Steroid, Epidural, L3/4;  Surgeon: Vanessa Walters MD;  Location: [facility];  Service: Pain Management;  Laterality: N/A;    EPIDURAL STEROID INJECTION N/A 9/5/2023    Procedure: Injection, Steroid, Epidural, L3/4;  Surgeon: Vanessa Walters MD;  Location: [facility];  Service: Pain Management;  Laterality: N/A;    HYSTERECTOMY      INJECTION OF ANESTHETIC AGENT INTO SACROILIAC JOINT Bilateral 5/9/2023    Procedure: BLOCK, SACROILIAC JOINT;  Surgeon: Vanessa Walters MD;  Location: [facility];  Service: Pain Management;  Laterality: Bilateral;    INJECTION OF ANESTHETIC AGENT INTO SACROILIAC JOINT Bilateral 6/6/2023    Procedure: BLOCK, SACROILIAC JOINT;  Surgeon: Vanessa Walters MD;  Location: [facility];  Service: Pain Management;  Laterality: Bilateral;     Social History     Socioeconomic History    Marital status: Single   Tobacco Use    Smoking status: Never    Smokeless tobacco: Never   Substance and Sexual Activity    Alcohol use: Never    Drug use: Not Currently     Family History   Problem Relation Age of Onset    Arthritis Mother     Cancer Mother     Depression Mother     Diabetes Mother     Heart disease Mother     Hypertension Mother     Stroke Mother     Asthma Daughter     Arthritis Maternal Grandmother     Cancer Maternal Grandmother     Depression  "Maternal Grandmother     Diabetes Maternal Grandmother     Hypertension Maternal Grandmother     Heart disease Maternal Grandfather     Hypertension Maternal Grandfather     Cancer Paternal Grandmother     Depression Paternal Grandmother     Diabetes Paternal Grandmother      Review of patient's allergies indicates:   Allergen Reactions    Opioids - morphine analogues     Pcn [penicillins]     Pineapple Rash        Objective:  Vitals:    01/09/24 0948 01/09/24 0949   BP: 110/63    Pulse: 61    Weight: 68 kg (150 lb)    Height: 5' 6" (1.676 m)    PainSc:   8   8         Physical Exam  Vitals and nursing note reviewed. Exam conducted with a chaperone present.   Constitutional:       General: She is awake. She is not in acute distress.     Appearance: Normal appearance. She is not ill-appearing, toxic-appearing or diaphoretic.   HENT:      Head: Normocephalic and atraumatic.      Nose: Nose normal.      Mouth/Throat:      Mouth: Mucous membranes are moist.      Pharynx: Oropharynx is clear.   Eyes:      Conjunctiva/sclera: Conjunctivae normal.      Pupils: Pupils are equal, round, and reactive to light.   Cardiovascular:      Rate and Rhythm: Normal rate.   Pulmonary:      Effort: Pulmonary effort is normal. No respiratory distress.   Abdominal:      Palpations: Abdomen is soft.   Musculoskeletal:         General: No deformity or signs of injury. Normal range of motion.      Cervical back: Normal range of motion and neck supple.   Skin:     General: Skin is warm and dry.   Neurological:      General: No focal deficit present.      Mental Status: She is alert and oriented to person, place, and time. Mental status is at baseline.      Cranial Nerves: No cranial nerve deficit (II-XII).   Psychiatric:         Mood and Affect: Mood normal.         Behavior: Behavior normal. Behavior is cooperative.         Thought Content: Thought content normal.           MRI Shoulder Without Contrast Right  Narrative: EXAMINATION:  MRI " SHOULDER WITHOUT CONTRAST RIGHT    CLINICAL HISTORY:  Shoulder pain, rotator cuff disorder suspected, xray done; Pain in right shoulder    TECHNIQUE:  Axial, sagittal, and coronal oblique imaging is performed using T1, T2, proton density fat-sat and gradient sequences. No contrast was used.    COMPARISON:  None.    FINDINGS:  There is moderate degenerative change of the acromioclavicular joint and the joint alignment is normal. The acromion appears normally formed.    There is small amount of fluid in the subacromial subdeltoid bursa.  There is partial tearing articular surface supraspinatus and infraspinatus tendons.  Small amount of bursal surface tear infraspinatus tendon.  Distinct full-thickness tear not identified    There is partial tearing subscapularis tendon near the insertion.  There is slight increased signal in the intra-articular biceps tendon.    Biceps labral anchor and labrum appear within normal limits for non-arthrogram evaluation.    Osseous marrow signal appears within normal limits. No distinct chondral defects are identified.  Impression: Moderate acromioclavicular joint osteoarthrosis.  Partial tearing supraspinatus, infraspinatus and subscapularis tendons.  Mild-to-moderate tendinosis intra-articular biceps tendon.  Small amount of fluid in the subacromial subdeltoid bursa, small nonvisualized full-thickness tear could be present.    Electronically signed by: Anthony Yanez  Date:    11/21/2023  Time:    08:08         Office Visit on 12/14/2023   Component Date Value Ref Range Status    Iron 12/14/2023 74  50 - 170 µg/dL Final    Iron Saturation 12/14/2023 27  14 - 50 % Final    TIBC 12/14/2023 279  250 - 450 µg/dL Final    WBC 12/14/2023 4.53  4.50 - 11.00 K/uL Final    RBC 12/14/2023 5.27  4.20 - 5.40 M/uL Final    Hemoglobin 12/14/2023 12.0  12.0 - 16.0 g/dL Final    Hematocrit 12/14/2023 39.4  38.0 - 47.0 % Final    MCV 12/14/2023 74.8 (L)  80.0 - 96.0 fL Final    MCH 12/14/2023 22.8  (L)  27.0 - 31.0 pg Final    MCHC 12/14/2023 30.5 (L)  32.0 - 36.0 g/dL Final    RDW 12/14/2023 13.6  11.5 - 14.5 % Final    Platelet Count 12/14/2023 360  150 - 400 K/uL Final    MPV 12/14/2023 10.0  9.4 - 12.4 fL Final    Neutrophils % 12/14/2023 55.4  53.0 - 65.0 % Final    Lymphocytes % 12/14/2023 37.3  27.0 - 41.0 % Final    Monocytes % 12/14/2023 6.2 (H)  2.0 - 6.0 % Final    Eosinophils % 12/14/2023 0.7 (L)  1.0 - 4.0 % Final    Basophils % 12/14/2023 0.2  0.0 - 1.0 % Final    Immature Granulocytes % 12/14/2023 0.2  0.0 - 0.4 % Final    nRBC, Auto 12/14/2023 0.0  <=0.0 % Final    Neutrophils, Abs 12/14/2023 2.51  1.80 - 7.70 K/uL Final    Lymphocytes, Absolute 12/14/2023 1.69  1.00 - 4.80 K/uL Final    Monocytes, Absolute 12/14/2023 0.28  0.00 - 0.80 K/uL Final    Eosinophils, Absolute 12/14/2023 0.03  0.00 - 0.50 K/uL Final    Basophils, Absolute 12/14/2023 0.01  0.00 - 0.20 K/uL Final    Immature Granulocytes, Absolute 12/14/2023 0.01  0.00 - 0.04 K/uL Final    nRBC, Absolute 12/14/2023 0.00  <=0.00 x10e3/uL Final    Diff Type 12/14/2023 Scan Smear   Final    Platelet Morphology 12/14/2023 Few Large Platelets (A)  Normal Final    Microcytosis 12/14/2023 1+   Final    Ovalocytes 12/14/2023 Few   Final    Hypochromic 12/14/2023 Few   Final   Office Visit on 11/07/2023   Component Date Value Ref Range Status    POC Amphetamines 11/07/2023 Negative  Negative, Inconclusive Final    POC Barbiturates 11/07/2023 Negative  Negative, Inconclusive Final    POC Benzodiazepines 11/07/2023 Presumptive Positive (A)  Negative, Inconclusive Final    POC Cocaine 11/07/2023 Negative  Negative, Inconclusive Final    POC THC 11/07/2023 Negative  Negative, Inconclusive Final    POC Methadone 11/07/2023 Negative  Negative, Inconclusive Final    POC Methamphetamine 11/07/2023 Negative  Negative, Inconclusive Final    POC Opiates 11/07/2023 Negative  Negative, Inconclusive Final    POC Oxycodone 11/07/2023 Negative  Negative,  Inconclusive Final    POC Phencyclidine 11/07/2023 Negative  Negative, Inconclusive Final    POC Methylenedioxymethamphetamine * 11/07/2023 Negative  Negative, Inconclusive Final    POC Tricyclic Antidepressants 11/07/2023 Negative  Negative, Inconclusive Final    POC Buprenorphine 11/07/2023 Negative   Final     Acceptable 11/07/2023 Yes   Final    POC Temperature (Urine) 11/07/2023 90   Final    pH, UA 11/07/2023 6.5  5.0 to 8.0 pH Units Final    Creatinine, Urine 11/07/2023 228 (H)  28 - 219 mg/dL Final    6-Acetylmorphine 11/07/2023 Negative  10 ng/mL Final    7-Aminoclonazepam 11/07/2023 Negative  Negative 25 ng/mL Final    a-Hydroxyalprazolam 11/07/2023 Negative  Negative 25 ng/mL Final    Acetyl Fentanyl 11/07/2023 Negative  Negative 2.5 ng/mL Final    Acetyl Norfentanyl Oxalate 11/07/2023 Negative  Negative 5 ng/mL Final    Benzoylecgonine 11/07/2023 Negative  Negative 100 ng/mL Final    Buprenorphine 11/07/2023 Negative  25 ng/mL Final    Codeine 11/07/2023 Negative  Negative 25 ng/mL Final    EDDP 11/07/2023 Negative  Negative 25 ng/mL Final    Fentanyl 11/07/2023 Negative  Negative 2.5 ng/mL Final    Hydrocodone 11/07/2023 Negative  Negative 25 ng/mL Final    Hydromorphone 11/07/2023 Negative  Negative 25 ng/mL Final    Lorazepam 11/07/2023 Negative  Negative 25 ng/mL Final    Morphine 11/07/2023 Negative  Negative 25 ng/mL Final    Norbuprenorphine 11/07/2023 Negative  Negative 25 ng/mL Final    Nordiazepam 11/07/2023 Negative  Negative 25 ng/mL Final    Norfentanyl Oxalate 11/07/2023 Negative  Negative 5 ng/mL Final    Norhydrocodone 11/07/2023 Negative  Negative 50 ng/mL Final    Noroxycodone HCL 11/07/2023 Negative  Negative 50 ng/mL Final    Oxazepam 11/07/2023 Negative  Negative 25 ng/mL Final    Oxymorphone 11/07/2023 Negative  Negative 25 ng/mL Final    Tapentadol 11/07/2023 Negative  Negative 25 ng/mL Final    Temazepam 11/07/2023 Negative  Negative 25 ng/mL Final    THC-COOH  11/07/2023 Negative  Negative 25 ng/mL Final    Tramadol 11/07/2023 Negative  Negative 100 ng/mL Final    Amphetamine, Urine 11/07/2023 Negative  Negative Final    Methamphetamines, Urine 11/07/2023 Negative  Negative Final    Methadone, Urine 11/07/2023 Negative  Negative 25 ng/mL Final    Oxycodone, Urine 11/07/2023 Negative  Negative 25 ng/mL Final    Specific [place], UA 11/07/2023 1.029  <=1.030 Final   Office Visit on 11/03/2023   Component Date Value Ref Range Status    POC Rapid COVID 11/03/2023 Negative  Negative Final     Acceptable 11/03/2023 Yes   Final    Rapid Influenza A Ag 11/03/2023 Negative  Negative Final    Rapid Influenza B Ag 11/03/2023 Negative  Negative Final     Acceptable 11/03/2023 Yes   Final    Color, UA 11/03/2023 Yellow   Final    Spec Grav UA 11/03/2023 1.030   Final    pH, UA 11/03/2023 6.5   Final    WBC, UA 11/03/2023 Negative   Final    Nitrite, UA 11/03/2023 Negative   Final    Protein, POC 11/03/2023 Negative   Final    Glucose, UA 11/03/2023 Negative   Final    Ketones, UA 11/03/2023 Negative   Final    Bilirubin, POC 11/03/2023 Negative   Final    Urobilinogen, UA 11/03/2023 0.2   Final    Blood, UA 11/03/2023 Negative   Final    POC Glucose 11/03/2023 131 (A)  70 - 110 MG/DL Final    Sodium 11/03/2023 141  136 - 145 mmol/L Final    Potassium 11/03/2023 3.9  3.5 - 5.1 mmol/L Final    Chloride 11/03/2023 107  98 - 107 mmol/L Final    CO2 11/03/2023 31  21 - 32 mmol/L Final    Anion Gap 11/03/2023 7  7 - 16 mmol/L Final    Glucose 11/03/2023 112 (H)  74 - 106 mg/dL Final    BUN 11/03/2023 7  7 - 18 mg/dL Final    Creatinine 11/03/2023 0.89  0.55 - 1.02 mg/dL Final    BUN/Creatinine Ratio 11/03/2023 8  6 - 20 Final    Calcium 11/03/2023 9.6  8.5 - 10.1 mg/dL Final    Total Protein 11/03/2023 8.0  6.4 - 8.2 g/dL Final    Albumin 11/03/2023 4.1  3.5 - 5.0 g/dL Final    Globulin 11/03/2023 3.9  2.0 - 4.0 g/dL Final    A/G Ratio 11/03/2023 1.1   Final     Bilirubin, Total 11/03/2023 0.1  >0.0 - 1.2 mg/dL Final    Alk Phos 11/03/2023 127 (H)  37 - 98 U/L Final    ALT 11/03/2023 20  13 - 56 U/L Final    AST 11/03/2023 8 (L)  15 - 37 U/L Final    eGFR 11/03/2023 85  >=60 mL/min/1.73m2 Final    Free T4 11/03/2023 0.87  0.76 - 1.46 ng/dL Final    TSH 11/03/2023 1.460  0.358 - 3.740 uIU/mL Final    Hemoglobin A1C 11/03/2023 5.8  4.5 - 6.6 % Final    Estimated Average Glucose 11/03/2023 107  mg/dL Final    Culture, Urine 11/03/2023 Skin/Urogenital Racheal Isolated, no further workup.   Final    WBC 11/03/2023 4.33 (L)  4.50 - 11.00 K/uL Final    RBC 11/03/2023 5.31  4.20 - 5.40 M/uL Final    Hemoglobin 11/03/2023 12.4  12.0 - 16.0 g/dL Final    Hematocrit 11/03/2023 41.3  38.0 - 47.0 % Final    MCV 11/03/2023 77.8 (L)  80.0 - 96.0 fL Final    MCH 11/03/2023 23.4 (L)  27.0 - 31.0 pg Final    MCHC 11/03/2023 30.0 (L)  32.0 - 36.0 g/dL Final    RDW 11/03/2023 14.1  11.5 - 14.5 % Final    Platelet Count 11/03/2023 342  150 - 400 K/uL Final    MPV 11/03/2023 9.9  9.4 - 12.4 fL Final    Neutrophils % 11/03/2023 45.8 (L)  53.0 - 65.0 % Final    Lymphocytes % 11/03/2023 46.4 (H)  27.0 - 41.0 % Final    Monocytes % 11/03/2023 6.5 (H)  2.0 - 6.0 % Final    Eosinophils % 11/03/2023 0.9 (L)  1.0 - 4.0 % Final    Basophils % 11/03/2023 0.2  0.0 - 1.0 % Final    Immature Granulocytes % 11/03/2023 0.2  0.0 - 0.4 % Final    nRBC, Auto 11/03/2023 0.0  <=0.0 % Final    Neutrophils, Abs 11/03/2023 1.98  1.80 - 7.70 K/uL Final    Lymphocytes, Absolute 11/03/2023 2.01  1.00 - 4.80 K/uL Final    Monocytes, Absolute 11/03/2023 0.28  0.00 - 0.80 K/uL Final    Eosinophils, Absolute 11/03/2023 0.04  0.00 - 0.50 K/uL Final    Basophils, Absolute 11/03/2023 0.01  0.00 - 0.20 K/uL Final    Immature Granulocytes, Absolute 11/03/2023 0.01  0.00 - 0.04 K/uL Final    nRBC, Absolute 11/03/2023 0.00  <=0.00 x10e3/uL Final    Diff Type 11/03/2023 Auto   Final   Office Visit on 07/10/2023   Component Date Value  Ref Range Status    POC Amphetamines 07/10/2023 Negative  Negative, Inconclusive Final    POC Barbiturates 07/10/2023 Negative  Negative, Inconclusive Final    POC Benzodiazepines 07/10/2023 Negative  Negative, Inconclusive Final    POC Cocaine 07/10/2023 Negative  Negative, Inconclusive Final    POC THC 07/10/2023 Negative  Negative, Inconclusive Final    POC Methadone 07/10/2023 Negative  Negative, Inconclusive Final    POC Methamphetamine 07/10/2023 Negative  Negative, Inconclusive Final    POC Opiates 07/10/2023 Negative  Negative, Inconclusive Final    POC Oxycodone 07/10/2023 Negative  Negative, Inconclusive Final    POC Phencyclidine 07/10/2023 Negative  Negative, Inconclusive Final    POC Methylenedioxymethamphetamine * 07/10/2023 Negative  Negative, Inconclusive Final    POC Tricyclic Antidepressants 07/10/2023 Negative  Negative, Inconclusive Final    POC Buprenorphine 07/10/2023 Negative   Final     Acceptable 07/10/2023 Yes   Final    POC Temperature (Urine) 07/10/2023 90   Final         No orders of the defined types were placed in this encounter.        Requested Prescriptions     Signed Prescriptions Disp Refills    acetaminophen-codeine 300-30mg (TYLENOL #3) 300-30 mg Tab 60 tablet 0     Sig: Take 1 tablet by mouth every 12 (twelve) hours as needed (pain).       Assessment:     1. Lumbar radiculopathy, chronic    2. Chronic pain of both knees    3. Polyarthralgia    4. Sacroiliitis         A's of Opioid Risk Assessment  Activity:Patient can perform ADL.   Analgesia:Patients pain is partially controlled by current medication. Patient has tried OTC medications such as Tylenol and Ibuprofen with out relief.   Adverse Effects: Patient denies constipation or sedation.  Aberrant Behavior:  reviewed with no aberrant drug seeking/taking behavior.  Overdose reversal drug naloxone discussed     Drug screen reviewed    MRI right shoulder [facility] November 21, 2023  Moderate  acromioclavicular joint osteoarthrosis.  Partial tearing supraspinatus, infraspinatus and subscapularis tendons.  Mild-to-moderate tendinosis intra-articular biceps tendon.  Small amount of fluid in the subacromial subdeltoid bursa, small nonvisualized full-thickness tear could be present.             Plan:    Narcan January 2023    No show for lumbar L3/4 CARMEN # 2 September 5, 2023 November 9, 2023 definitive drug screen returned negative no benzodiazepine no buprenorphine  Last refill date buprenorphine patch August 9, 2023 drug screen should have been negative    Sacroiliac procedure scheduled for July 27, 2023 insurance company denied sacroiliac RF TC    She is requesting reschedule her procedure for back pain buttock and leg pain numbness and tingling radicular in nature ongoing for more than 3 months    Complaint right shoulder pain worse with range of motion ongoing for more than 3 months getting worse with time     Requesting referral orthopedics right shoulder pain Dr. De Leon      She states insurance company will no longer provide coverage for Butrans patch requesting options     Discontinue Butrans    Tylenol 3 1 p.o. q.12 hours, 60 tablets      She had bilateral sacroiliac injection # 2, June 6, 2023  she states she had 50% relief after procedure, his procedure did help improve her level of function     She had lumbar L3/4 CARMEN # 1 December 29, 2022 she states she would 80% relief initially, maintain 50% relief with time, she states procedure did help improve her level function     She is requesting to reschedule lumbar procedure for lumbar radiculopathy symptoms pain numbness and tingling bilateral lower extremities radicular in nature    MRI lumbar spine [facility] November 30, 2022 L3/4 far lateral disc bulge right greater than left multiple level degenerative changes     X-ray bilateral knees [facility] November 30, 2022 degenerative changes no fracture noted    X-ray lumbar spine [facility] November 30, 2022 degenerative changes no fracture noted    X-ray hips [facility] November 30, 2022 degenerative changes no fracture noted    Continue home exercise program as directed     Indications for this procedure for this specific patient include the following     - Injections being provided as part of a comprehensive pain management program.    - Pt has failed 6 weeks of conservative therapy including oral meds, PT and or home exercise program which has been discussed with the patient   - Injection being provided for suspected radicular pain.    - Pain scale of greater than or equal to 3/10 with functional impairment  - No evidence of local or systemic infection, bleeding tendency or unstable medical condition.    - Pain is causing significant functional limitation resulting in diminished quality of life and impaired age appropriate ADL's.   - Repeat injections are done no sooner than 7 days after the previous injection  - Epidural done for suspected radicular pain along dermatome of nerve   - Epidural done to differentiate level of radicular nerve root pain   -procedure done prior to surgical consideration  - Repeat injections done only when pt reports 50% improvement in pain from previous injections    -increased level of function  - patient is aware if they take any NSAIDs/anticoagulant prior to procedure procedure will be postponed, this was listed on the preop instructions and highlighted, list of NSAIDs/anticoagulant reviewed with patient to include methotrexate  - Injection done at L3/4 level(s) which is consistent with patient's dermatomal pain complaint  The planned medically necessary  surgical procedure is performed in a hospital outpatient department and not in an ambulatory surgical center due to:     -there is no geographically assessable ambulatory surgery center that has the  necessary equipment and fluoroscopy needed for the procedure     -there is no geographically assessable  ambulatory surgical center available at which the physician has privileges     -an ASC's  specific  guideline regarding the individuals weight or health conditions that prevent the use of an ASC     -done under fluoro  Monitor anesthesia request is medically indicated for the scheduled nerve block procedure due to:  1- needle phobia and anxiety, placing  the patient at risk during the provided service.  2-patient has an ASA class greater than 3 and requires constant presence of an anesthesiologist during the procedure,   3-patient has severe problems hard to lie still  4-patient suffers from chronic pain and is unable to function due to  diminished ADLs    Schedule lumbar L3/4 CARMEN # 2, lumbar radiculopathy    Dr. Walters

## 2024-01-08 NOTE — PROGRESS NOTES
Subjective:         Patient ID: Keshia Shepherd is a 38 y.o. female.    Chief Complaint: Shoulder Pain and Back Pain (Right )        Pain  This is a chronic problem. The current episode started more than 1 year ago. The problem occurs daily. The problem has been waxing and waning. Associated symptoms include arthralgias, myalgias and neck pain. Pertinent negatives include no anorexia, chest pain, chills, coughing, diaphoresis, fever, sore throat, vertigo or vomiting.     Review of Systems   Constitutional:  Negative for activity change, appetite change, chills, diaphoresis, fever and unexpected weight change.   HENT:  Negative for drooling, ear discharge, ear pain, facial swelling, nosebleeds, sore throat, trouble swallowing, voice change and goiter.    Eyes:  Negative for photophobia, pain, discharge, redness and visual disturbance.   Respiratory:  Negative for apnea, cough, choking, chest tightness, shortness of breath, wheezing and stridor.    Cardiovascular:  Negative for chest pain, palpitations and leg swelling.   Gastrointestinal:  Negative for abdominal distention, anorexia, diarrhea, rectal pain, vomiting and fecal incontinence.   Endocrine: Negative for cold intolerance, heat intolerance, polydipsia, polyphagia and polyuria.   Genitourinary:  Negative for bladder incontinence, dysuria, flank pain, frequency, hematuria and hot flashes.   Musculoskeletal:  Positive for arthralgias, back pain, leg pain, myalgias, neck pain and neck stiffness. Negative for gait problem and joint deformity.   Integumentary:  Negative for color change and pallor.   Allergic/Immunologic: Negative for immunocompromised state.   Neurological:  Negative for dizziness, vertigo, seizures, syncope, facial asymmetry, speech difficulty, light-headedness, memory loss and coordination difficulties.   Hematological:  Negative for adenopathy. Does not bruise/bleed easily.   Psychiatric/Behavioral:  Negative for agitation, behavioral  problems, confusion, decreased concentration, dysphoric mood, hallucinations, self-injury and suicidal ideas. The patient is not nervous/anxious and is not hyperactive.            Past Medical History:   Diagnosis Date    Asthma     Cervical radiculopathy     Chronic pain syndrome     Depressive disorder     Fibromyalgia     GERD (gastroesophageal reflux disease)     Hyperlipidemia     Osteoarthritis     Palpitations     Radiculopathy of cervical region      Past Surgical History:   Procedure Laterality Date    CARPAL TUNNEL RELEASE Bilateral     EPIDURAL STEROID INJECTION N/A 12/29/2022    Procedure: Injection, Steroid, Epidural, L3/4;  Surgeon: Vanessa Walters MD;  Location: [facility];  Service: Pain Management;  Laterality: N/A;    EPIDURAL STEROID INJECTION N/A 9/5/2023    Procedure: Injection, Steroid, Epidural, L3/4;  Surgeon: Vanessa Walters MD;  Location: [facility];  Service: Pain Management;  Laterality: N/A;    HYSTERECTOMY      INJECTION OF ANESTHETIC AGENT INTO SACROILIAC JOINT Bilateral 5/9/2023    Procedure: BLOCK, SACROILIAC JOINT;  Surgeon: Vanessa Walters MD;  Location: [facility];  Service: Pain Management;  Laterality: Bilateral;    INJECTION OF ANESTHETIC AGENT INTO SACROILIAC JOINT Bilateral 6/6/2023    Procedure: BLOCK, SACROILIAC JOINT;  Surgeon: Vanessa Walters MD;  Location: [facility];  Service: Pain Management;  Laterality: Bilateral;     Social History     Socioeconomic History    Marital status: Single   Tobacco Use    Smoking status: Never    Smokeless tobacco: Never   Substance and Sexual Activity    Alcohol use: Never    Drug use: Not Currently     Family History   Problem Relation Age of Onset    Arthritis Mother     Cancer Mother     Depression Mother     Diabetes Mother     Heart disease Mother     Hypertension Mother     Stroke Mother     Asthma Daughter     Arthritis Maternal Grandmother     Cancer Maternal Grandmother     Depression  "Maternal Grandmother     Diabetes Maternal Grandmother     Hypertension Maternal Grandmother     Heart disease Maternal Grandfather     Hypertension Maternal Grandfather     Cancer Paternal Grandmother     Depression Paternal Grandmother     Diabetes Paternal Grandmother      Review of patient's allergies indicates:   Allergen Reactions    Opioids - morphine analogues     Pcn [penicillins]     Pineapple Rash        Objective:  Vitals:    01/09/24 0948 01/09/24 0949   BP: 110/63    Pulse: 61    Weight: 68 kg (150 lb)    Height: 5' 6" (1.676 m)    PainSc:   8   8         Physical Exam  Vitals and nursing note reviewed. Exam conducted with a chaperone present.   Constitutional:       General: She is awake. She is not in acute distress.     Appearance: Normal appearance. She is not ill-appearing, toxic-appearing or diaphoretic.   HENT:      Head: Normocephalic and atraumatic.      Nose: Nose normal.      Mouth/Throat:      Mouth: Mucous membranes are moist.      Pharynx: Oropharynx is clear.   Eyes:      Conjunctiva/sclera: Conjunctivae normal.      Pupils: Pupils are equal, round, and reactive to light.   Cardiovascular:      Rate and Rhythm: Normal rate.   Pulmonary:      Effort: Pulmonary effort is normal. No respiratory distress.   Abdominal:      Palpations: Abdomen is soft.   Musculoskeletal:         General: No deformity or signs of injury. Normal range of motion.      Cervical back: Normal range of motion and neck supple.   Skin:     General: Skin is warm and dry.   Neurological:      General: No focal deficit present.      Mental Status: She is alert and oriented to person, place, and time. Mental status is at baseline.      Cranial Nerves: No cranial nerve deficit (II-XII).   Psychiatric:         Mood and Affect: Mood normal.         Behavior: Behavior normal. Behavior is cooperative.         Thought Content: Thought content normal.           MRI Shoulder Without Contrast Right  Narrative: EXAMINATION:  MRI " SHOULDER WITHOUT CONTRAST RIGHT    CLINICAL HISTORY:  Shoulder pain, rotator cuff disorder suspected, xray done; Pain in right shoulder    TECHNIQUE:  Axial, sagittal, and coronal oblique imaging is performed using T1, T2, proton density fat-sat and gradient sequences. No contrast was used.    COMPARISON:  None.    FINDINGS:  There is moderate degenerative change of the acromioclavicular joint and the joint alignment is normal. The acromion appears normally formed.    There is small amount of fluid in the subacromial subdeltoid bursa.  There is partial tearing articular surface supraspinatus and infraspinatus tendons.  Small amount of bursal surface tear infraspinatus tendon.  Distinct full-thickness tear not identified    There is partial tearing subscapularis tendon near the insertion.  There is slight increased signal in the intra-articular biceps tendon.    Biceps labral anchor and labrum appear within normal limits for non-arthrogram evaluation.    Osseous marrow signal appears within normal limits. No distinct chondral defects are identified.  Impression: Moderate acromioclavicular joint osteoarthrosis.  Partial tearing supraspinatus, infraspinatus and subscapularis tendons.  Mild-to-moderate tendinosis intra-articular biceps tendon.  Small amount of fluid in the subacromial subdeltoid bursa, small nonvisualized full-thickness tear could be present.    Electronically signed by: Anthony Yanez  Date:    11/21/2023  Time:    08:08         Office Visit on 12/14/2023   Component Date Value Ref Range Status    Iron 12/14/2023 74  50 - 170 µg/dL Final    Iron Saturation 12/14/2023 27  14 - 50 % Final    TIBC 12/14/2023 279  250 - 450 µg/dL Final    WBC 12/14/2023 4.53  4.50 - 11.00 K/uL Final    RBC 12/14/2023 5.27  4.20 - 5.40 M/uL Final    Hemoglobin 12/14/2023 12.0  12.0 - 16.0 g/dL Final    Hematocrit 12/14/2023 39.4  38.0 - 47.0 % Final    MCV 12/14/2023 74.8 (L)  80.0 - 96.0 fL Final    MCH 12/14/2023 22.8  (L)  27.0 - 31.0 pg Final    MCHC 12/14/2023 30.5 (L)  32.0 - 36.0 g/dL Final    RDW 12/14/2023 13.6  11.5 - 14.5 % Final    Platelet Count 12/14/2023 360  150 - 400 K/uL Final    MPV 12/14/2023 10.0  9.4 - 12.4 fL Final    Neutrophils % 12/14/2023 55.4  53.0 - 65.0 % Final    Lymphocytes % 12/14/2023 37.3  27.0 - 41.0 % Final    Monocytes % 12/14/2023 6.2 (H)  2.0 - 6.0 % Final    Eosinophils % 12/14/2023 0.7 (L)  1.0 - 4.0 % Final    Basophils % 12/14/2023 0.2  0.0 - 1.0 % Final    Immature Granulocytes % 12/14/2023 0.2  0.0 - 0.4 % Final    nRBC, Auto 12/14/2023 0.0  <=0.0 % Final    Neutrophils, Abs 12/14/2023 2.51  1.80 - 7.70 K/uL Final    Lymphocytes, Absolute 12/14/2023 1.69  1.00 - 4.80 K/uL Final    Monocytes, Absolute 12/14/2023 0.28  0.00 - 0.80 K/uL Final    Eosinophils, Absolute 12/14/2023 0.03  0.00 - 0.50 K/uL Final    Basophils, Absolute 12/14/2023 0.01  0.00 - 0.20 K/uL Final    Immature Granulocytes, Absolute 12/14/2023 0.01  0.00 - 0.04 K/uL Final    nRBC, Absolute 12/14/2023 0.00  <=0.00 x10e3/uL Final    Diff Type 12/14/2023 Scan Smear   Final    Platelet Morphology 12/14/2023 Few Large Platelets (A)  Normal Final    Microcytosis 12/14/2023 1+   Final    Ovalocytes 12/14/2023 Few   Final    Hypochromic 12/14/2023 Few   Final   Office Visit on 11/07/2023   Component Date Value Ref Range Status    POC Amphetamines 11/07/2023 Negative  Negative, Inconclusive Final    POC Barbiturates 11/07/2023 Negative  Negative, Inconclusive Final    POC Benzodiazepines 11/07/2023 Presumptive Positive (A)  Negative, Inconclusive Final    POC Cocaine 11/07/2023 Negative  Negative, Inconclusive Final    POC THC 11/07/2023 Negative  Negative, Inconclusive Final    POC Methadone 11/07/2023 Negative  Negative, Inconclusive Final    POC Methamphetamine 11/07/2023 Negative  Negative, Inconclusive Final    POC Opiates 11/07/2023 Negative  Negative, Inconclusive Final    POC Oxycodone 11/07/2023 Negative  Negative,  Inconclusive Final    POC Phencyclidine 11/07/2023 Negative  Negative, Inconclusive Final    POC Methylenedioxymethamphetamine * 11/07/2023 Negative  Negative, Inconclusive Final    POC Tricyclic Antidepressants 11/07/2023 Negative  Negative, Inconclusive Final    POC Buprenorphine 11/07/2023 Negative   Final     Acceptable 11/07/2023 Yes   Final    POC Temperature (Urine) 11/07/2023 90   Final    pH, UA 11/07/2023 6.5  5.0 to 8.0 pH Units Final    Creatinine, Urine 11/07/2023 228 (H)  28 - 219 mg/dL Final    6-Acetylmorphine 11/07/2023 Negative  10 ng/mL Final    7-Aminoclonazepam 11/07/2023 Negative  Negative 25 ng/mL Final    a-Hydroxyalprazolam 11/07/2023 Negative  Negative 25 ng/mL Final    Acetyl Fentanyl 11/07/2023 Negative  Negative 2.5 ng/mL Final    Acetyl Norfentanyl Oxalate 11/07/2023 Negative  Negative 5 ng/mL Final    Benzoylecgonine 11/07/2023 Negative  Negative 100 ng/mL Final    Buprenorphine 11/07/2023 Negative  25 ng/mL Final    Codeine 11/07/2023 Negative  Negative 25 ng/mL Final    EDDP 11/07/2023 Negative  Negative 25 ng/mL Final    Fentanyl 11/07/2023 Negative  Negative 2.5 ng/mL Final    Hydrocodone 11/07/2023 Negative  Negative 25 ng/mL Final    Hydromorphone 11/07/2023 Negative  Negative 25 ng/mL Final    Lorazepam 11/07/2023 Negative  Negative 25 ng/mL Final    Morphine 11/07/2023 Negative  Negative 25 ng/mL Final    Norbuprenorphine 11/07/2023 Negative  Negative 25 ng/mL Final    Nordiazepam 11/07/2023 Negative  Negative 25 ng/mL Final    Norfentanyl Oxalate 11/07/2023 Negative  Negative 5 ng/mL Final    Norhydrocodone 11/07/2023 Negative  Negative 50 ng/mL Final    Noroxycodone HCL 11/07/2023 Negative  Negative 50 ng/mL Final    Oxazepam 11/07/2023 Negative  Negative 25 ng/mL Final    Oxymorphone 11/07/2023 Negative  Negative 25 ng/mL Final    Tapentadol 11/07/2023 Negative  Negative 25 ng/mL Final    Temazepam 11/07/2023 Negative  Negative 25 ng/mL Final    THC-COOH  11/07/2023 Negative  Negative 25 ng/mL Final    Tramadol 11/07/2023 Negative  Negative 100 ng/mL Final    Amphetamine, Urine 11/07/2023 Negative  Negative Final    Methamphetamines, Urine 11/07/2023 Negative  Negative Final    Methadone, Urine 11/07/2023 Negative  Negative 25 ng/mL Final    Oxycodone, Urine 11/07/2023 Negative  Negative 25 ng/mL Final    Specific [place], UA 11/07/2023 1.029  <=1.030 Final   Office Visit on 11/03/2023   Component Date Value Ref Range Status    POC Rapid COVID 11/03/2023 Negative  Negative Final     Acceptable 11/03/2023 Yes   Final    Rapid Influenza A Ag 11/03/2023 Negative  Negative Final    Rapid Influenza B Ag 11/03/2023 Negative  Negative Final     Acceptable 11/03/2023 Yes   Final    Color, UA 11/03/2023 Yellow   Final    Spec Grav UA 11/03/2023 1.030   Final    pH, UA 11/03/2023 6.5   Final    WBC, UA 11/03/2023 Negative   Final    Nitrite, UA 11/03/2023 Negative   Final    Protein, POC 11/03/2023 Negative   Final    Glucose, UA 11/03/2023 Negative   Final    Ketones, UA 11/03/2023 Negative   Final    Bilirubin, POC 11/03/2023 Negative   Final    Urobilinogen, UA 11/03/2023 0.2   Final    Blood, UA 11/03/2023 Negative   Final    POC Glucose 11/03/2023 131 (A)  70 - 110 MG/DL Final    Sodium 11/03/2023 141  136 - 145 mmol/L Final    Potassium 11/03/2023 3.9  3.5 - 5.1 mmol/L Final    Chloride 11/03/2023 107  98 - 107 mmol/L Final    CO2 11/03/2023 31  21 - 32 mmol/L Final    Anion Gap 11/03/2023 7  7 - 16 mmol/L Final    Glucose 11/03/2023 112 (H)  74 - 106 mg/dL Final    BUN 11/03/2023 7  7 - 18 mg/dL Final    Creatinine 11/03/2023 0.89  0.55 - 1.02 mg/dL Final    BUN/Creatinine Ratio 11/03/2023 8  6 - 20 Final    Calcium 11/03/2023 9.6  8.5 - 10.1 mg/dL Final    Total Protein 11/03/2023 8.0  6.4 - 8.2 g/dL Final    Albumin 11/03/2023 4.1  3.5 - 5.0 g/dL Final    Globulin 11/03/2023 3.9  2.0 - 4.0 g/dL Final    A/G Ratio 11/03/2023 1.1   Final     Bilirubin, Total 11/03/2023 0.1  >0.0 - 1.2 mg/dL Final    Alk Phos 11/03/2023 127 (H)  37 - 98 U/L Final    ALT 11/03/2023 20  13 - 56 U/L Final    AST 11/03/2023 8 (L)  15 - 37 U/L Final    eGFR 11/03/2023 85  >=60 mL/min/1.73m2 Final    Free T4 11/03/2023 0.87  0.76 - 1.46 ng/dL Final    TSH 11/03/2023 1.460  0.358 - 3.740 uIU/mL Final    Hemoglobin A1C 11/03/2023 5.8  4.5 - 6.6 % Final    Estimated Average Glucose 11/03/2023 107  mg/dL Final    Culture, Urine 11/03/2023 Skin/Urogenital Racheal Isolated, no further workup.   Final    WBC 11/03/2023 4.33 (L)  4.50 - 11.00 K/uL Final    RBC 11/03/2023 5.31  4.20 - 5.40 M/uL Final    Hemoglobin 11/03/2023 12.4  12.0 - 16.0 g/dL Final    Hematocrit 11/03/2023 41.3  38.0 - 47.0 % Final    MCV 11/03/2023 77.8 (L)  80.0 - 96.0 fL Final    MCH 11/03/2023 23.4 (L)  27.0 - 31.0 pg Final    MCHC 11/03/2023 30.0 (L)  32.0 - 36.0 g/dL Final    RDW 11/03/2023 14.1  11.5 - 14.5 % Final    Platelet Count 11/03/2023 342  150 - 400 K/uL Final    MPV 11/03/2023 9.9  9.4 - 12.4 fL Final    Neutrophils % 11/03/2023 45.8 (L)  53.0 - 65.0 % Final    Lymphocytes % 11/03/2023 46.4 (H)  27.0 - 41.0 % Final    Monocytes % 11/03/2023 6.5 (H)  2.0 - 6.0 % Final    Eosinophils % 11/03/2023 0.9 (L)  1.0 - 4.0 % Final    Basophils % 11/03/2023 0.2  0.0 - 1.0 % Final    Immature Granulocytes % 11/03/2023 0.2  0.0 - 0.4 % Final    nRBC, Auto 11/03/2023 0.0  <=0.0 % Final    Neutrophils, Abs 11/03/2023 1.98  1.80 - 7.70 K/uL Final    Lymphocytes, Absolute 11/03/2023 2.01  1.00 - 4.80 K/uL Final    Monocytes, Absolute 11/03/2023 0.28  0.00 - 0.80 K/uL Final    Eosinophils, Absolute 11/03/2023 0.04  0.00 - 0.50 K/uL Final    Basophils, Absolute 11/03/2023 0.01  0.00 - 0.20 K/uL Final    Immature Granulocytes, Absolute 11/03/2023 0.01  0.00 - 0.04 K/uL Final    nRBC, Absolute 11/03/2023 0.00  <=0.00 x10e3/uL Final    Diff Type 11/03/2023 Auto   Final   Office Visit on 07/10/2023   Component Date Value  Ref Range Status    POC Amphetamines 07/10/2023 Negative  Negative, Inconclusive Final    POC Barbiturates 07/10/2023 Negative  Negative, Inconclusive Final    POC Benzodiazepines 07/10/2023 Negative  Negative, Inconclusive Final    POC Cocaine 07/10/2023 Negative  Negative, Inconclusive Final    POC THC 07/10/2023 Negative  Negative, Inconclusive Final    POC Methadone 07/10/2023 Negative  Negative, Inconclusive Final    POC Methamphetamine 07/10/2023 Negative  Negative, Inconclusive Final    POC Opiates 07/10/2023 Negative  Negative, Inconclusive Final    POC Oxycodone 07/10/2023 Negative  Negative, Inconclusive Final    POC Phencyclidine 07/10/2023 Negative  Negative, Inconclusive Final    POC Methylenedioxymethamphetamine * 07/10/2023 Negative  Negative, Inconclusive Final    POC Tricyclic Antidepressants 07/10/2023 Negative  Negative, Inconclusive Final    POC Buprenorphine 07/10/2023 Negative   Final     Acceptable 07/10/2023 Yes   Final    POC Temperature (Urine) 07/10/2023 90   Final         No orders of the defined types were placed in this encounter.        Requested Prescriptions     Signed Prescriptions Disp Refills    acetaminophen-codeine 300-30mg (TYLENOL #3) 300-30 mg Tab 60 tablet 0     Sig: Take 1 tablet by mouth every 12 (twelve) hours as needed (pain).       Assessment:     1. Lumbar radiculopathy, chronic    2. Chronic pain of both knees    3. Polyarthralgia    4. Sacroiliitis         A's of Opioid Risk Assessment  Activity:Patient can perform ADL.   Analgesia:Patients pain is partially controlled by current medication. Patient has tried OTC medications such as Tylenol and Ibuprofen with out relief.   Adverse Effects: Patient denies constipation or sedation.  Aberrant Behavior:  reviewed with no aberrant drug seeking/taking behavior.  Overdose reversal drug naloxone discussed     Drug screen reviewed    MRI right shoulder [facility] November 21, 2023  Moderate  acromioclavicular joint osteoarthrosis.  Partial tearing supraspinatus, infraspinatus and subscapularis tendons.  Mild-to-moderate tendinosis intra-articular biceps tendon.  Small amount of fluid in the subacromial subdeltoid bursa, small nonvisualized full-thickness tear could be present.             Plan:    Narcan January 2023    No show for lumbar L3/4 CARMEN # 2 September 5, 2023 November 9, 2023 definitive drug screen returned negative no benzodiazepine no buprenorphine  Last refill date buprenorphine patch August 9, 2023 drug screen should have been negative    Sacroiliac procedure scheduled for July 27, 2023 insurance company denied sacroiliac RF TC    She is requesting reschedule her procedure for back pain buttock and leg pain numbness and tingling radicular in nature ongoing for more than 3 months    Complaint right shoulder pain worse with range of motion ongoing for more than 3 months getting worse with time     Requesting referral orthopedics right shoulder pain Dr. De Leon      She states insurance company will no longer provide coverage for Butrans patch requesting options     Discontinue Butrans    Tylenol 3 1 p.o. q.12 hours, 60 tablets      She had bilateral sacroiliac injection # 2, June 6, 2023  she states she had 50% relief after procedure, his procedure did help improve her level of function     She had lumbar L3/4 CARMEN # 1 December 29, 2022 she states she would 80% relief initially, maintain 50% relief with time, she states procedure did help improve her level function     She is requesting to reschedule lumbar procedure for lumbar radiculopathy symptoms pain numbness and tingling bilateral lower extremities radicular in nature    MRI lumbar spine [facility] November 30, 2022 L3/4 far lateral disc bulge right greater than left multiple level degenerative changes     X-ray bilateral knees [facility] November 30, 2022 degenerative changes no fracture noted    X-ray lumbar spine [facility] November 30, 2022 degenerative changes no fracture noted    X-ray hips [facility] November 30, 2022 degenerative changes no fracture noted    Continue home exercise program as directed     Indications for this procedure for this specific patient include the following     - Injections being provided as part of a comprehensive pain management program.    - Pt has failed 6 weeks of conservative therapy including oral meds, PT and or home exercise program which has been discussed with the patient   - Injection being provided for suspected radicular pain.    - Pain scale of greater than or equal to 3/10 with functional impairment  - No evidence of local or systemic infection, bleeding tendency or unstable medical condition.    - Pain is causing significant functional limitation resulting in diminished quality of life and impaired age appropriate ADL's.   - Repeat injections are done no sooner than 7 days after the previous injection  - Epidural done for suspected radicular pain along dermatome of nerve   - Epidural done to differentiate level of radicular nerve root pain   -procedure done prior to surgical consideration  - Repeat injections done only when pt reports 50% improvement in pain from previous injections    -increased level of function  - patient is aware if they take any NSAIDs/anticoagulant prior to procedure procedure will be postponed, this was listed on the preop instructions and highlighted, list of NSAIDs/anticoagulant reviewed with patient to include methotrexate  - Injection done at L3/4 level(s) which is consistent with patient's dermatomal pain complaint  The planned medically necessary  surgical procedure is performed in a hospital outpatient department and not in an ambulatory surgical center due to:     -there is no geographically assessable ambulatory surgery center that has the  necessary equipment and fluoroscopy needed for the procedure     -there is no geographically assessable  ambulatory surgical center available at which the physician has privileges     -an ASC's  specific  guideline regarding the individuals weight or health conditions that prevent the use of an ASC     -done under fluoro  Monitor anesthesia request is medically indicated for the scheduled nerve block procedure due to:  1- needle phobia and anxiety, placing  the patient at risk during the provided service.  2-patient has an ASA class greater than 3 and requires constant presence of an anesthesiologist during the procedure,   3-patient has severe problems hard to lie still  4-patient suffers from chronic pain and is unable to function due to  diminished ADLs    Schedule lumbar L3/4 CARMEN # 2, lumbar radiculopathy    Dr. Walters

## 2024-01-09 ENCOUNTER — OFFICE VISIT (OUTPATIENT)
Dept: PAIN MEDICINE | Facility: CLINIC | Age: 39
End: 2024-01-09
Payer: COMMERCIAL

## 2024-01-09 VITALS
SYSTOLIC BLOOD PRESSURE: 110 MMHG | HEART RATE: 61 BPM | DIASTOLIC BLOOD PRESSURE: 63 MMHG | BODY MASS INDEX: 24.11 KG/M2 | HEIGHT: 66 IN | WEIGHT: 150 LBS

## 2024-01-09 DIAGNOSIS — M25.561 CHRONIC PAIN OF BOTH KNEES: Chronic | ICD-10-CM

## 2024-01-09 DIAGNOSIS — M25.50 POLYARTHRALGIA: Chronic | ICD-10-CM

## 2024-01-09 DIAGNOSIS — M25.562 CHRONIC PAIN OF BOTH KNEES: Chronic | ICD-10-CM

## 2024-01-09 DIAGNOSIS — M54.16 LUMBAR RADICULOPATHY, CHRONIC: Primary | Chronic | ICD-10-CM

## 2024-01-09 DIAGNOSIS — M46.1 SACROILIITIS: Chronic | ICD-10-CM

## 2024-01-09 DIAGNOSIS — G89.29 CHRONIC PAIN OF BOTH KNEES: Chronic | ICD-10-CM

## 2024-01-09 PROCEDURE — 99214 OFFICE O/P EST MOD 30 MIN: CPT | Mod: S$PBB,,, | Performed by: PHYSICIAN ASSISTANT

## 2024-01-09 PROCEDURE — 3074F SYST BP LT 130 MM HG: CPT | Mod: CPTII,,, | Performed by: PHYSICIAN ASSISTANT

## 2024-01-09 PROCEDURE — 3008F BODY MASS INDEX DOCD: CPT | Mod: CPTII,,, | Performed by: PHYSICIAN ASSISTANT

## 2024-01-09 PROCEDURE — 1159F MED LIST DOCD IN RCRD: CPT | Mod: CPTII,,, | Performed by: PHYSICIAN ASSISTANT

## 2024-01-09 PROCEDURE — 3078F DIAST BP <80 MM HG: CPT | Mod: CPTII,,, | Performed by: PHYSICIAN ASSISTANT

## 2024-01-09 PROCEDURE — 99215 OFFICE O/P EST HI 40 MIN: CPT | Mod: PBBFAC | Performed by: PHYSICIAN ASSISTANT

## 2024-01-09 RX ORDER — ACETAMINOPHEN AND CODEINE PHOSPHATE 300; 30 MG/1; MG/1
1 TABLET ORAL EVERY 12 HOURS PRN
Qty: 60 TABLET | Refills: 0 | Status: SHIPPED | OUTPATIENT
Start: 2024-01-09 | End: 2024-02-01 | Stop reason: SDUPTHER

## 2024-01-09 NOTE — PATIENT INSTRUCTIONS

## 2024-01-09 NOTE — LETTER
January 9, 2024      [facility]  [address] [place] [id number]  Phone: [phone]       Patient: Keshia Shepherd   YOB: 1985  Date of Visit: 01/09/2024    To Whom It May Concern:    Taylor Shepherd  was at [facility] on 01/09/2024. The patient may return to work/school on 01/10/23.  If you have any questions or concerns, or if I can be of further assistance, please do not hesitate to contact me.    Sincerely,    Ericka Knutson RN

## 2024-01-23 ENCOUNTER — ANESTHESIA EVENT (OUTPATIENT)
Dept: PAIN MEDICINE | Facility: HOSPITAL | Age: 39
End: 2024-01-23
Payer: COMMERCIAL

## 2024-01-23 ENCOUNTER — HOSPITAL ENCOUNTER (OUTPATIENT)
Facility: HOSPITAL | Age: 39
Discharge: HOME | End: 2024-01-23
Attending: PAIN MEDICINE | Admitting: PAIN MEDICINE
Payer: COMMERCIAL

## 2024-01-23 ENCOUNTER — ANESTHESIA (OUTPATIENT)
Dept: PAIN MEDICINE | Facility: HOSPITAL | Age: 39
End: 2024-01-23
Payer: COMMERCIAL

## 2024-01-23 VITALS
BODY MASS INDEX: 24.27 KG/M2 | OXYGEN SATURATION: 100 % | DIASTOLIC BLOOD PRESSURE: 82 MMHG | RESPIRATION RATE: 10 BRPM | WEIGHT: 151 LBS | HEIGHT: 66 IN | TEMPERATURE: 98 F | HEART RATE: 69 BPM | SYSTOLIC BLOOD PRESSURE: 147 MMHG

## 2024-01-23 DIAGNOSIS — M54.16 LUMBAR RADICULOPATHY: ICD-10-CM

## 2024-01-23 PROCEDURE — 62323 NJX INTERLAMINAR LMBR/SAC: CPT | Mod: ,,, | Performed by: PAIN MEDICINE

## 2024-01-23 PROCEDURE — 25500020 PHARM REV CODE 255: Performed by: PAIN MEDICINE

## 2024-01-23 PROCEDURE — 37000008 HC ANESTHESIA 1ST 15 MINUTES: Performed by: PAIN MEDICINE

## 2024-01-23 PROCEDURE — 25000003 PHARM REV CODE 250: Performed by: PAIN MEDICINE

## 2024-01-23 PROCEDURE — 63600175 PHARM REV CODE 636 W HCPCS: Performed by: PAIN MEDICINE

## 2024-01-23 PROCEDURE — 37000009 HC ANESTHESIA EA ADD 15 MINS: Performed by: PAIN MEDICINE

## 2024-01-23 PROCEDURE — 62323 NJX INTERLAMINAR LMBR/SAC: CPT | Performed by: PAIN MEDICINE

## 2024-01-23 PROCEDURE — D9220A PRA ANESTHESIA: Mod: 23,,, | Performed by: ANESTHESIOLOGY

## 2024-01-23 RX ORDER — LIDOCAINE HYDROCHLORIDE 10 MG/ML
INJECTION INFILTRATION; PERINEURAL CODE/TRAUMA/SEDATION MEDICATION
Status: DISCONTINUED | OUTPATIENT
Start: 2024-01-23 | End: 2024-01-23 | Stop reason: HOSPADM

## 2024-01-23 RX ORDER — TRIAMCINOLONE ACETONIDE 40 MG/ML
INJECTION, SUSPENSION INTRA-ARTICULAR; INTRAMUSCULAR CODE/TRAUMA/SEDATION MEDICATION
Status: DISCONTINUED | OUTPATIENT
Start: 2024-01-23 | End: 2024-01-23 | Stop reason: HOSPADM

## 2024-01-23 RX ORDER — IOPAMIDOL 612 MG/ML
INJECTION, SOLUTION INTRATHECAL CODE/TRAUMA/SEDATION MEDICATION
Status: DISCONTINUED | OUTPATIENT
Start: 2024-01-23 | End: 2024-01-23 | Stop reason: HOSPADM

## 2024-01-23 RX ORDER — SODIUM CHLORIDE 9 MG/ML
INJECTION, SOLUTION INTRAVENOUS CONTINUOUS
Status: DISCONTINUED | OUTPATIENT
Start: 2024-01-23 | End: 2024-01-23 | Stop reason: HOSPADM

## 2024-01-23 NOTE — TRANSFER OF CARE
"Anesthesia Transfer of Care Note    Patient: Keshia Shepherd    Procedure(s) Performed: Procedure(s) (LRB):  Injection, Steroid, Epidural, L3/4 (N/A)    Patient location: PACU    Anesthesia Type: general    Transport from OR: Transported from OR on room air with adequate spontaneous ventilation    Post pain: adequate analgesia    Post assessment: no apparent anesthetic complications    Post vital signs: stable    Level of consciousness: awake and responds to stimulation    Nausea/Vomiting: no nausea/vomiting    Complications: none    Transfer of care protocol was followed      Last vitals: Visit Vitals  /61   Pulse 70   Temp 37.2 °C (98.9 °F)   Resp 18   Ht 5' 6" (1.676 m)   Wt 68.5 kg (151 lb)   SpO2 100%   BMI 24.37 kg/m²     "

## 2024-01-23 NOTE — ANESTHESIA PREPROCEDURE EVALUATION
01/23/2024  Keshia Shepherd is a 38 y.o., female.    Past Medical History:   Diagnosis Date    Asthma     Cervical radiculopathy     Chronic pain syndrome     Depressive disorder     Fibromyalgia     GERD (gastroesophageal reflux disease)     Hyperlipidemia     Osteoarthritis     Palpitations     Radiculopathy of cervical region        Past Surgical History:   Procedure Laterality Date    CARPAL TUNNEL RELEASE Bilateral     EPIDURAL STEROID INJECTION N/A 12/29/2022    Procedure: Injection, Steroid, Epidural, L3/4;  Surgeon: Vanessa Walters MD;  Location: [facility];  Service: Pain Management;  Laterality: N/A;    EPIDURAL STEROID INJECTION N/A 9/5/2023    Procedure: Injection, Steroid, Epidural, L3/4;  Surgeon: Vanessa Walters MD;  Location: [facility];  Service: Pain Management;  Laterality: N/A;    HYSTERECTOMY      INJECTION OF ANESTHETIC AGENT INTO SACROILIAC JOINT Bilateral 5/9/2023    Procedure: BLOCK, SACROILIAC JOINT;  Surgeon: Vanessa Walters MD;  Location: [facility];  Service: Pain Management;  Laterality: Bilateral;    INJECTION OF ANESTHETIC AGENT INTO SACROILIAC JOINT Bilateral 6/6/2023    Procedure: BLOCK, SACROILIAC JOINT;  Surgeon: Vanessa Walters MD;  Location: [facility];  Service: Pain Management;  Laterality: Bilateral;       Family History   Problem Relation Age of Onset    Arthritis Mother     Cancer Mother     Depression Mother     Diabetes Mother     Heart disease Mother     Hypertension Mother     Stroke Mother     Asthma Daughter     Arthritis Maternal Grandmother     Cancer Maternal Grandmother     Depression Maternal Grandmother     Diabetes Maternal Grandmother     Hypertension Maternal Grandmother     Heart disease Maternal Grandfather     Hypertension Maternal Grandfather     Cancer Paternal Grandmother     Depression Paternal Grandmother      Diabetes Paternal Grandmother        Social History     Socioeconomic History    Marital status: Single   Tobacco Use    Smoking status: Never    Smokeless tobacco: Never   Substance and Sexual Activity    Alcohol use: Never    Drug use: Not Currently       No current facility-administered medications for this encounter.       Review of patient's allergies indicates:   Allergen Reactions    Opioids - morphine analogues     Pcn [penicillins]     Pineapple Rash          Pre-op Assessment    I have reviewed the Patient Summary Reports.     I have reviewed the Nursing Notes. I have reviewed the NPO Status.   I have reviewed the Medications.     Review of Systems  Anesthesia Hx:  No problems with previous Anesthesia                Social:  Non-Smoker       Pulmonary:    Asthma       Asthma:               Hepatic/GI:     GERD      Gerd          Musculoskeletal:  Arthritis        Arthritis          Neurological:    Neuromuscular Disease,           Arthritis                         Neuromuscular Disease   Psych:  Psychiatric History                  Physical Exam  General: Well nourished        Anesthesia Plan  Type of Anesthesia, risks & benefits discussed:    Anesthesia Type: Gen Natural Airway  Intra-op Monitoring Plan: Standard ASA Monitors  Post Op Pain Control Plan: IV/PO Opioids PRN  Induction:  IV  Informed Consent: Informed consent signed with the Patient and all parties understand the risks and agree with anesthesia plan.  All questions answered. Patient consented to blood products? Yes  ASA Score: 2  Day of Surgery Review of History & Physical: H&P Update referred to the surgeon/provider.I have interviewed and examined the patient. I have reviewed the patient's H&P dated: There are no significant changes.     Ready For Surgery From Anesthesia Perspective.     .

## 2024-01-23 NOTE — DISCHARGE SUMMARY
[facility] - Pain Management  Discharge Note  Short Stay    Procedure(s) (LRB):  Injection, Steroid, Epidural, L3/4 (N/A)      OUTCOME: Patient tolerated treatment/procedure well without complication and is now ready for discharge.    DISPOSITION: Home or Self Care    FINAL DIAGNOSIS:  Lumbar radiculopathy    FOLLOWUP: In clinic    DISCHARGE INSTRUCTIONS:  See nurse's notes     TIME SPENT ON DISCHARGE: 5 minutes

## 2024-01-23 NOTE — BRIEF OP NOTE
The  Discharge Note  Short Stay    Admit Date: 1/23/2024    Discharge Date: 1/23/2024    Attending Physician: Vanessa Walters     Discharge Provider: Vanessa Walters    Diagnosis: Lumbar radiculopathy    Procedure performed: L3-4 epidural steroid injection and epiduralgram    Findings: Procedure tolerated well and without complications. Consistent with diagnosis.    EBL: 0cc    Specimens: None    Discharged Condition: Good    Final Diagnoses: Lumbar radiculopathy, chronic [M54.16]    Disposition: Home or Self Care    Hospital Course: No complications, uneventful    Outcome of Hospitalization, Treatment, Procedure, or Surgery:  Patient was admitted for outpatient interventional pain management procedure. The patient tolerated the procedure well with no complications.    Follow up/Patient Instructions:  Follow up as scheduled in Pain Management office in 3-4 weeks.  Patient has received instructions and follow up date and time.    Medications:  Continue previous medications

## 2024-01-23 NOTE — DISCHARGE INSTRUCTIONS
No driving, operating machinery, making legal decisions, or signing legal documents until tomorrow.   Continue diet as tolerated. Drink plenty of fluids and rest.   If unable to void in 8 hours proceed to nearest ER.   Notify MD of redness or drainage from incision site as well as any fever over the next 3-4 days.  No lifting over 5 lbs for the next 24 hrs.   Continue medications as prescribed. May take pain medication as prescribed.   May shower tomorrow. No tub baths for 48 hours following procedure.   May remove bandaids tomorrow, if they fall off before tomorrow they do not have to be replaced.    If you experience any uncontrolled pain, nausea or vomiting after your procedure, do not hesitate to call the clinic.

## 2024-01-23 NOTE — PLAN OF CARE
Plan:  D/c pt via wheelchair at 1444  Informed pt if does not void in 8 hours to go to ER. Notify if redness, drainage, from injection site or fever over next 3-4 days. Rest and drink plenty of fluids for the remainder of the day. No lifting over 5 lbs. For the remainder of the day. Continue regular medications as prescribed. May take pain medications as prescribed.     Pain improved 100%

## 2024-01-23 NOTE — OP NOTE
"Procedure Note    Procedure Date: 1/23/2024    Procedure Performed:  Lumbar interlaminar epidural steroid injection under fluoroscopy at L3-4    Indications: Patient failed conservative therapy.      Pre-op diagnosis: Lumbar Radiculopathy    Post-op diagnosis: same    Physician: Vanessa Walters MD    Anesthesia: MAC    Medications injected: Kenalog 40mg,  2 mL sterile preservative-free normal saline.    Local anesthetic used: 1% Lidocaine, 3 ml    Estimated Blood Loss: None    Complications:  Kamran    Technique:  The patient was interviewed in the holding area and Risks/Benefits were discussed, including, but not limited to, the possibility of new or different pain, bleeding or infection.   All questions were answered.  The patient understood and accepted risks.  Consent was verified and signed.   A time-out was taken to identify patient and procedure prior to starting the procedure. The patient was placed in the prone position on the fluoroscopy table. The area of the lumbar spine was prepped with Chloraprep and draped in a sterile manner. The L3-4  interspace was identified and marked under AP fluoroscopy. The skin and subcutaneous tissues overlying the targeted interspace were anesthetized with 3-5 mL of 1% lidocaine using a 25G 1.5" needle.  A 20G  3.5" Tuohy epidural needle was directed toward the interspace under fluoroscopic guidance until the ligamentum flavum was engaged. From this point, a loss of resistance technique with a pulsator syringe a was used to identify entrance of the needle into the epidural space. Once loss of resistance was observed 3mL of Isovue contrast solution was injected. An appropriate epidurogram was noted.  A 3mL mixture consisting of saline and 40 mg of kenalog was injected slowly and without resistance.  The needle was  removed and a sterile Band-Aid dressing was applied to the puncture site.  The patient tolerated the procedure well and was transferred to the .AC. in stable " condition.  The patient was monitored after the procedure and was given post-procedure and discharge instructions to follow at home. The patient was discharged in a stable condition and accompanied by an adult .    Epidurogram:5 mL allotment of Isovue M 300 contrast revealed excellent delineation from L2-5. There were no filling defects or obstruction to dye flow noted.  There was no  intravascular or intrathecal spread noted with dye flow.

## 2024-01-25 NOTE — ANESTHESIA POSTPROCEDURE EVALUATION
Anesthesia Post Evaluation    Patient: Keshia Shepherd    Procedure(s) Performed: Procedure(s) (LRB):  Injection, Steroid, Epidural, L3/4 (N/A)    Final Anesthesia Type: general      Patient location during evaluation: PACU  Patient participation: Yes- Able to Participate  Level of consciousness: awake and alert, oriented and awake  Post-procedure vital signs: reviewed and stable  Pain management: adequate  Airway patency: patent    PONV status at discharge: No PONV  Anesthetic complications: no      Cardiovascular status: blood pressure returned to baseline, hemodynamically stable and stable  Respiratory status: unassisted and spontaneous ventilation  Hydration status: euvolemic  Follow-up not needed.              Vitals Value Taken Time   /61 01/23/24 1433   Temp 36.7 °C (98 °F) 01/23/24 1402   Pulse 56 01/23/24 1436   Resp 17 01/23/24 1436   SpO2 100 % 01/23/24 1436   Vitals shown include unvalidated device data.      Event Time   Out of Recovery 14:30:00         Pain/Jack Score: No data recorded

## 2024-01-30 ENCOUNTER — OFFICE VISIT (OUTPATIENT)
Dept: ORTHOPEDICS | Facility: CLINIC | Age: 39
End: 2024-01-30
Payer: COMMERCIAL

## 2024-01-30 DIAGNOSIS — M25.511 ACUTE PAIN OF RIGHT SHOULDER: Primary | ICD-10-CM

## 2024-01-30 PROCEDURE — 1159F MED LIST DOCD IN RCRD: CPT | Mod: CPTII,,, | Performed by: ORTHOPAEDIC SURGERY

## 2024-01-30 PROCEDURE — 99213 OFFICE O/P EST LOW 20 MIN: CPT | Mod: PBBFAC | Performed by: ORTHOPAEDIC SURGERY

## 2024-01-30 PROCEDURE — 99203 OFFICE O/P NEW LOW 30 MIN: CPT | Mod: S$PBB,,, | Performed by: ORTHOPAEDIC SURGERY

## 2024-01-30 RX ORDER — CELECOXIB 200 MG/1
200 CAPSULE ORAL 2 TIMES DAILY
Qty: 60 CAPSULE | Refills: 1 | Status: SHIPPED | OUTPATIENT
Start: 2024-01-30

## 2024-01-30 NOTE — PROGRESS NOTES
CC:   Chief Complaint   Patient presents with    Right Shoulder - Pain        PREVIOUS INFO:        HISTORY:   1/30/2024    Keshia Shepherd  is a 39 y.o. 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 she has been having right shoulder pain for at least a year she has underlying cervical and lumbar spine problems she is seen in the Pain Clinic and has had injections of each for but never her shoulder she has had no treatment on her shoulder she states she has not on anti-inflammatories she has been in physical therapy      PAST MEDICAL HISTORY:   Past Medical History:   Diagnosis Date    Asthma     Cervical radiculopathy     Chronic pain syndrome     Depressive disorder     Fibromyalgia     GERD (gastroesophageal reflux disease)     Hyperlipidemia     Osteoarthritis     Palpitations     Radiculopathy of cervical region           PAST SURGICAL HISTORY:   Past Surgical History:   Procedure Laterality Date    CARPAL TUNNEL RELEASE Bilateral     EPIDURAL STEROID INJECTION N/A 12/29/2022    Procedure: Injection, Steroid, Epidural, L3/4;  Surgeon: Vanessa Walters MD;  Location: [facility];  Service: Pain Management;  Laterality: N/A;    EPIDURAL STEROID INJECTION N/A 9/5/2023    Procedure: Injection, Steroid, Epidural, L3/4;  Surgeon: Vanessa Walters MD;  Location: [facility];  Service: Pain Management;  Laterality: N/A;    EPIDURAL STEROID INJECTION N/A 1/23/2024    Procedure: Injection, Steroid, Epidural, L3/4;  Surgeon: Vanessa Walters MD;  Location: [facility];  Service: Pain Management;  Laterality: N/A;    HYSTERECTOMY      INJECTION OF ANESTHETIC AGENT INTO SACROILIAC JOINT Bilateral 5/9/2023    Procedure: BLOCK, SACROILIAC JOINT;  Surgeon: Vanessa Walters MD;  Location: [facility];  Service: Pain Management;  Laterality: Bilateral;    INJECTION OF ANESTHETIC  AGENT INTO SACROILIAC JOINT Bilateral 6/6/2023    Procedure: BLOCK, SACROILIAC JOINT;  Surgeon: Vanessa Walters MD;  Location: [facility];  Service: Pain Management;  Laterality: Bilateral;          ALLERGIES:   Review of patient's allergies indicates:   Allergen Reactions    Opioids - morphine analogues     Pcn [penicillins]     Pineapple Rash        MEDICATIONS :    Current Outpatient Medications:     acetaminophen-codeine 300-30mg (TYLENOL #3) 300-30 mg Tab, Take 1 tablet by mouth every 12 (twelve) hours as needed (pain)., Disp: 60 tablet, Rfl: 0    albuterol (PROVENTIL/VENTOLIN HFA) 90 mcg/actuation inhaler, Inhale 2 puffs into the lungs every 6 (six) hours as needed., Disp: , Rfl:     buprenorphine 5 mcg/hour (BUTRANS) weekly patch, Place 1 patch onto the skin once a week. (Patient not taking: Reported on 1/9/2024), Disp: 4 patch, Rfl: 0    mirtazapine (REMERON) 30 MG tablet, Take 30 mg by mouth., Disp: , Rfl:     mupirocin (BACTROBAN) 2 % ointment, Apply topically 3 (three) times daily. (Patient not taking: Reported on 1/9/2024), Disp: 22 g, Rfl: 0    ondansetron (ZOFRAN-ODT) 4 MG TbDL, Take 4 mg by mouth., Disp: , Rfl:     pantoprazole (PROTONIX) 40 MG tablet, Take 40 mg by mouth once daily., Disp: , Rfl:     Current Facility-Administered Medications:     LIDOcaine-EPINEPHrine 1%-1:100,000 injection 1 mL, 1 mL, Intradermal, 1 time in Clinic/HOD, Erika Torres MD     SOCIAL HISTORY:   Social History     Socioeconomic History    Marital status: Single   Tobacco Use    Smoking status: Never    Smokeless tobacco: Never   Substance and Sexual Activity    Alcohol use: Never    Drug use: Not Currently        ROS    FAMILY HISTORY:   Family History   Problem Relation Age of Onset    Arthritis Mother     Cancer Mother     Depression Mother     Diabetes Mother     Heart disease Mother     Hypertension Mother     Stroke Mother     Asthma Daughter     Arthritis Maternal Grandmother     Cancer Maternal  Grandmother     Depression Maternal Grandmother     Diabetes Maternal Grandmother     Hypertension Maternal Grandmother     Heart disease Maternal Grandfather     Hypertension Maternal Grandfather     Cancer Paternal Grandmother     Depression Paternal Grandmother     Diabetes Paternal Grandmother           PHYSICAL EXAM: There were no vitals filed for this visit.            There is no height or weight on file to calculate BMI.     In general, this is a well-developed, well-nourished female . The patient is alert, oriented and cooperative.      HEENT:  Normocephalic, atraumatic.  Extraocular movements are intact bilaterally.  The oropharynx is benign.       NECK:  Nontender with good range of motion.      PULMONARY: Respirations are even and non-labored.       CARDIOVASCULAR: Pulses regular by peripheral palpation.       ABDOMEN:  Soft, non-tender, non-distended.        EXTREMITIES:  On physical exam her she is tender over the tender the cervical spine she is tender over the muscles medially and laterally flexion of her neck causes pain extension her neck causes pain she only has 20° of motion to the left and right of her cervical spine    Right shoulder the sternoclavicular joint causes pain the clavicle causes pain the AC joint causes pain the anterior shoulder causes pain in the lateral shoulder causes pain and she is tender over the scapula in addition about 90° of forward flexion with pain about 70° of abduction with pain    Ortho Exam      RADIOGRAPHIC FINDINGS:  MRI right shoulder November 21, 2023  Per my review the MRI there was least tendinopathy could be partial tears no complete tear visualized     Impression:     Moderate acromioclavicular joint osteoarthrosis.  Partial tearing supraspinatus, infraspinatus and subscapularis tendons.  Mild-to-moderate tendinosis intra-articular biceps tendon.  Small amount of fluid in the subacromial subdeltoid bursa, small nonvisualized full-thickness tear could be  present.        Electronically signed by: Anthony Yanez  Date:                                            11/21/2023 November 7, 2023 2 APs right shoulder with internal external rotation joints congruent reduced no fracture dislocation appreciated    .      IMPRESSION:  Right shoulder pain very diffuse in nature very inconsistent with the MRI findings    PLAN:  Will treat conservatively here anti-inflammatories prescription for Celebrex is written if it bothers her stomach stop it immediately and formal physical therapy check on her in about 6 weeks        No follow-ups on file.         Servando Henao III      (Subject to voice recognition error, transcription service not allowed)

## 2024-02-01 NOTE — PROGRESS NOTES
Subjective:         Patient ID: Keshia Shepherd is a 39 y.o. female.    Chief Complaint: Shoulder Pain and Low-back Pain        Pain  This is a chronic problem. The current episode started more than 1 year ago. The problem occurs daily. The problem has been unchanged. Associated symptoms include arthralgias, myalgias and neck pain. Pertinent negatives include no anorexia, chest pain, chills, coughing, diaphoresis, fever, sore throat, vertigo or vomiting.     Review of Systems   Constitutional:  Negative for activity change, appetite change, chills, diaphoresis, fever and unexpected weight change.   HENT:  Negative for drooling, ear discharge, ear pain, facial swelling, nosebleeds, sore throat, trouble swallowing, voice change and goiter.    Eyes:  Negative for photophobia, pain, discharge, redness and visual disturbance.   Respiratory:  Negative for apnea, cough, choking, chest tightness, shortness of breath, wheezing and stridor.    Cardiovascular:  Negative for chest pain, palpitations and leg swelling.   Gastrointestinal:  Negative for abdominal distention, anorexia, diarrhea, rectal pain, vomiting and fecal incontinence.   Endocrine: Negative for cold intolerance, heat intolerance, polydipsia, polyphagia and polyuria.   Genitourinary:  Negative for bladder incontinence, dysuria, flank pain, frequency, hematuria and hot flashes.   Musculoskeletal:  Positive for arthralgias, back pain, leg pain, myalgias, neck pain and neck stiffness. Negative for gait problem and joint deformity.   Integumentary:  Negative for color change and pallor.   Allergic/Immunologic: Negative for immunocompromised state.   Neurological:  Negative for dizziness, vertigo, seizures, syncope, facial asymmetry, speech difficulty, light-headedness, memory loss and coordination difficulties.   Hematological:  Negative for adenopathy. Does not bruise/bleed easily.   Psychiatric/Behavioral:  Negative for agitation, behavioral problems,  confusion, decreased concentration, dysphoric mood, hallucinations, self-injury and suicidal ideas. The patient is not nervous/anxious and is not hyperactive.            Past Medical History:   Diagnosis Date    Asthma     Cervical radiculopathy     Chronic pain syndrome     Depressive disorder     Fibromyalgia     GERD (gastroesophageal reflux disease)     Hyperlipidemia     Osteoarthritis     Palpitations     Radiculopathy of cervical region      Past Surgical History:   Procedure Laterality Date    CARPAL TUNNEL RELEASE Bilateral     EPIDURAL STEROID INJECTION N/A 12/29/2022    Procedure: Injection, Steroid, Epidural, L3/4;  Surgeon: Vanessa Walters MD;  Location: [facility];  Service: Pain Management;  Laterality: N/A;    EPIDURAL STEROID INJECTION N/A 9/5/2023    Procedure: Injection, Steroid, Epidural, L3/4;  Surgeon: Vanessa Walters MD;  Location: [facility];  Service: Pain Management;  Laterality: N/A;    EPIDURAL STEROID INJECTION N/A 1/23/2024    Procedure: Injection, Steroid, Epidural, L3/4;  Surgeon: Vanessa Walters MD;  Location: [facility];  Service: Pain Management;  Laterality: N/A;    HYSTERECTOMY      INJECTION OF ANESTHETIC AGENT INTO SACROILIAC JOINT Bilateral 5/9/2023    Procedure: BLOCK, SACROILIAC JOINT;  Surgeon: Vanessa Walters MD;  Location: [facility];  Service: Pain Management;  Laterality: Bilateral;    INJECTION OF ANESTHETIC AGENT INTO SACROILIAC JOINT Bilateral 6/6/2023    Procedure: BLOCK, SACROILIAC JOINT;  Surgeon: Vanessa Walters MD;  Location: [facility];  Service: Pain Management;  Laterality: Bilateral;     Social History     Socioeconomic History    Marital status: Single   Tobacco Use    Smoking status: Never    Smokeless tobacco: Never   Substance and Sexual Activity    Alcohol use: Never    Drug use: Not Currently     Family History   Problem Relation Age of Onset    Arthritis Mother     Cancer Mother     Depression Mother      "Diabetes Mother     Heart disease Mother     Hypertension Mother     Stroke Mother     Asthma Daughter     Arthritis Maternal Grandmother     Cancer Maternal Grandmother     Depression Maternal Grandmother     Diabetes Maternal Grandmother     Hypertension Maternal Grandmother     Heart disease Maternal Grandfather     Hypertension Maternal Grandfather     Cancer Paternal Grandmother     Depression Paternal Grandmother     Diabetes Paternal Grandmother      Review of patient's allergies indicates:   Allergen Reactions    Opioids - morphine analogues     Pcn [penicillins]     Pineapple Rash        Objective:  Vitals:    02/06/24 1526   BP: 123/63   Pulse: 77   Resp: 16   Weight: 67.1 kg (148 lb)   Height: 5' 6" (1.676 m)   PainSc:   7           Physical Exam  Vitals and nursing note reviewed. Exam conducted with a chaperone present.   Constitutional:       General: She is awake. She is not in acute distress.     Appearance: Normal appearance. She is not ill-appearing, toxic-appearing or diaphoretic.   HENT:      Head: Normocephalic and atraumatic.      Nose: Nose normal.      Mouth/Throat:      Mouth: Mucous membranes are moist.      Pharynx: Oropharynx is clear.   Eyes:      Conjunctiva/sclera: Conjunctivae normal.      Pupils: Pupils are equal, round, and reactive to light.   Cardiovascular:      Rate and Rhythm: Normal rate.   Pulmonary:      Effort: Pulmonary effort is normal. No respiratory distress.   Abdominal:      Palpations: Abdomen is soft.   Musculoskeletal:         General: No deformity or signs of injury. Normal range of motion.      Cervical back: Normal range of motion and neck supple.   Skin:     General: Skin is warm and dry.   Neurological:      General: No focal deficit present.      Mental Status: She is alert and oriented to person, place, and time. Mental status is at baseline.      Cranial Nerves: No cranial nerve deficit (II-XII).   Psychiatric:         Mood and Affect: Mood normal.         " Behavior: Behavior normal. Behavior is cooperative.         Thought Content: Thought content normal.           [place] Fluoro for Pain Management  See OP Notes for results.     IMPRESSION: See OP Notes for results.     This procedure was auto-finalized by: Virtual Radiologist         Office Visit on 12/14/2023   Component Date Value Ref Range Status    Iron 12/14/2023 74  50 - 170 µg/dL Final    Iron Saturation 12/14/2023 27  14 - 50 % Final    TIBC 12/14/2023 279  250 - 450 µg/dL Final    WBC 12/14/2023 4.53  4.50 - 11.00 K/uL Final    RBC 12/14/2023 5.27  4.20 - 5.40 M/uL Final    Hemoglobin 12/14/2023 12.0  12.0 - 16.0 g/dL Final    Hematocrit 12/14/2023 39.4  38.0 - 47.0 % Final    MCV 12/14/2023 74.8 (L)  80.0 - 96.0 fL Final    MCH 12/14/2023 22.8 (L)  27.0 - 31.0 pg Final    MCHC 12/14/2023 30.5 (L)  32.0 - 36.0 g/dL Final    RDW 12/14/2023 13.6  11.5 - 14.5 % Final    Platelet Count 12/14/2023 360  150 - 400 K/uL Final    MPV 12/14/2023 10.0  9.4 - 12.4 fL Final    Neutrophils % 12/14/2023 55.4  53.0 - 65.0 % Final    Lymphocytes % 12/14/2023 37.3  27.0 - 41.0 % Final    Monocytes % 12/14/2023 6.2 (H)  2.0 - 6.0 % Final    Eosinophils % 12/14/2023 0.7 (L)  1.0 - 4.0 % Final    Basophils % 12/14/2023 0.2  0.0 - 1.0 % Final    Immature Granulocytes % 12/14/2023 0.2  0.0 - 0.4 % Final    nRBC, Auto 12/14/2023 0.0  <=0.0 % Final    Neutrophils, Abs 12/14/2023 2.51  1.80 - 7.70 K/uL Final    Lymphocytes, Absolute 12/14/2023 1.69  1.00 - 4.80 K/uL Final    Monocytes, Absolute 12/14/2023 0.28  0.00 - 0.80 K/uL Final    Eosinophils, Absolute 12/14/2023 0.03  0.00 - 0.50 K/uL Final    Basophils, Absolute 12/14/2023 0.01  0.00 - 0.20 K/uL Final    Immature Granulocytes, Absolute 12/14/2023 0.01  0.00 - 0.04 K/uL Final    nRBC, Absolute 12/14/2023 0.00  <=0.00 x10e3/uL Final    Diff Type 12/14/2023 Scan Smear   Final    Platelet Morphology 12/14/2023 Few Large Platelets (A)  Normal Final    Microcytosis 12/14/2023 1+    Final    Ovalocytes 12/14/2023 Few   Final    Hypochromic 12/14/2023 Few   Final   Office Visit on 11/07/2023   Component Date Value Ref Range Status    POC Amphetamines 11/07/2023 Negative  Negative, Inconclusive Final    POC Barbiturates 11/07/2023 Negative  Negative, Inconclusive Final    POC Benzodiazepines 11/07/2023 Presumptive Positive (A)  Negative, Inconclusive Final    POC Cocaine 11/07/2023 Negative  Negative, Inconclusive Final    POC THC 11/07/2023 Negative  Negative, Inconclusive Final    POC Methadone 11/07/2023 Negative  Negative, Inconclusive Final    POC Methamphetamine 11/07/2023 Negative  Negative, Inconclusive Final    POC Opiates 11/07/2023 Negative  Negative, Inconclusive Final    POC Oxycodone 11/07/2023 Negative  Negative, Inconclusive Final    POC Phencyclidine 11/07/2023 Negative  Negative, Inconclusive Final    POC Methylenedioxymethamphetamine * 11/07/2023 Negative  Negative, Inconclusive Final    POC Tricyclic Antidepressants 11/07/2023 Negative  Negative, Inconclusive Final    POC Buprenorphine 11/07/2023 Negative   Final     Acceptable 11/07/2023 Yes   Final    POC Temperature (Urine) 11/07/2023 90   Final    pH, UA 11/07/2023 6.5  5.0 to 8.0 pH Units Final    Creatinine, Urine 11/07/2023 228 (H)  28 - 219 mg/dL Final    6-Acetylmorphine 11/07/2023 Negative  10 ng/mL Final    7-Aminoclonazepam 11/07/2023 Negative  Negative 25 ng/mL Final    a-Hydroxyalprazolam 11/07/2023 Negative  Negative 25 ng/mL Final    Acetyl Fentanyl 11/07/2023 Negative  Negative 2.5 ng/mL Final    Acetyl Norfentanyl Oxalate 11/07/2023 Negative  Negative 5 ng/mL Final    Benzoylecgonine 11/07/2023 Negative  Negative 100 ng/mL Final    Buprenorphine 11/07/2023 Negative  25 ng/mL Final    Codeine 11/07/2023 Negative  Negative 25 ng/mL Final    EDDP 11/07/2023 Negative  Negative 25 ng/mL Final    Fentanyl 11/07/2023 Negative  Negative 2.5 ng/mL Final    Hydrocodone 11/07/2023 Negative  Negative 25  ng/mL Final    Hydromorphone 11/07/2023 Negative  Negative 25 ng/mL Final    Lorazepam 11/07/2023 Negative  Negative 25 ng/mL Final    Morphine 11/07/2023 Negative  Negative 25 ng/mL Final    Norbuprenorphine 11/07/2023 Negative  Negative 25 ng/mL Final    Nordiazepam 11/07/2023 Negative  Negative 25 ng/mL Final    Norfentanyl Oxalate 11/07/2023 Negative  Negative 5 ng/mL Final    Norhydrocodone 11/07/2023 Negative  Negative 50 ng/mL Final    Noroxycodone HCL 11/07/2023 Negative  Negative 50 ng/mL Final    Oxazepam 11/07/2023 Negative  Negative 25 ng/mL Final    Oxymorphone 11/07/2023 Negative  Negative 25 ng/mL Final    Tapentadol 11/07/2023 Negative  Negative 25 ng/mL Final    Temazepam 11/07/2023 Negative  Negative 25 ng/mL Final    THC-COOH 11/07/2023 Negative  Negative 25 ng/mL Final    Tramadol 11/07/2023 Negative  Negative 100 ng/mL Final    Amphetamine, Urine 11/07/2023 Negative  Negative Final    Methamphetamines, Urine 11/07/2023 Negative  Negative Final    Methadone, Urine 11/07/2023 Negative  Negative 25 ng/mL Final    Oxycodone, Urine 11/07/2023 Negative  Negative 25 ng/mL Final    Specific [place], UA 11/07/2023 1.029  <=1.030 Final   Office Visit on 11/03/2023   Component Date Value Ref Range Status    POC Rapid COVID 11/03/2023 Negative  Negative Final     Acceptable 11/03/2023 Yes   Final    Rapid Influenza A Ag 11/03/2023 Negative  Negative Final    Rapid Influenza B Ag 11/03/2023 Negative  Negative Final     Acceptable 11/03/2023 Yes   Final    Color, UA 11/03/2023 Yellow   Final    Spec Grav UA 11/03/2023 1.030   Final    pH, UA 11/03/2023 6.5   Final    WBC, UA 11/03/2023 Negative   Final    Nitrite, UA 11/03/2023 Negative   Final    Protein, POC 11/03/2023 Negative   Final    Glucose, UA 11/03/2023 Negative   Final    Ketones, UA 11/03/2023 Negative   Final    Bilirubin, POC 11/03/2023 Negative   Final    Urobilinogen, UA 11/03/2023 0.2   Final    Blood, UA  11/03/2023 Negative   Final    POC Glucose 11/03/2023 131 (A)  70 - 110 MG/DL Final    Sodium 11/03/2023 141  136 - 145 mmol/L Final    Potassium 11/03/2023 3.9  3.5 - 5.1 mmol/L Final    Chloride 11/03/2023 107  98 - 107 mmol/L Final    CO2 11/03/2023 31  21 - 32 mmol/L Final    Anion Gap 11/03/2023 7  7 - 16 mmol/L Final    Glucose 11/03/2023 112 (H)  74 - 106 mg/dL Final    BUN 11/03/2023 7  7 - 18 mg/dL Final    Creatinine 11/03/2023 0.89  0.55 - 1.02 mg/dL Final    BUN/Creatinine Ratio 11/03/2023 8  6 - 20 Final    Calcium 11/03/2023 9.6  8.5 - 10.1 mg/dL Final    Total Protein 11/03/2023 8.0  6.4 - 8.2 g/dL Final    Albumin 11/03/2023 4.1  3.5 - 5.0 g/dL Final    Globulin 11/03/2023 3.9  2.0 - 4.0 g/dL Final    A/G Ratio 11/03/2023 1.1   Final    Bilirubin, Total 11/03/2023 0.1  >0.0 - 1.2 mg/dL Final    Alk Phos 11/03/2023 127 (H)  37 - 98 U/L Final    ALT 11/03/2023 20  13 - 56 U/L Final    AST 11/03/2023 8 (L)  15 - 37 U/L Final    eGFR 11/03/2023 85  >=60 mL/min/1.73m2 Final    Free T4 11/03/2023 0.87  0.76 - 1.46 ng/dL Final    TSH 11/03/2023 1.460  0.358 - 3.740 uIU/mL Final    Hemoglobin A1C 11/03/2023 5.8  4.5 - 6.6 % Final    Estimated Average Glucose 11/03/2023 107  mg/dL Final    Culture, Urine 11/03/2023 Skin/Urogenital Racheal Isolated, no further workup.   Final    WBC 11/03/2023 4.33 (L)  4.50 - 11.00 K/uL Final    RBC 11/03/2023 5.31  4.20 - 5.40 M/uL Final    Hemoglobin 11/03/2023 12.4  12.0 - 16.0 g/dL Final    Hematocrit 11/03/2023 41.3  38.0 - 47.0 % Final    MCV 11/03/2023 77.8 (L)  80.0 - 96.0 fL Final    MCH 11/03/2023 23.4 (L)  27.0 - 31.0 pg Final    MCHC 11/03/2023 30.0 (L)  32.0 - 36.0 g/dL Final    RDW 11/03/2023 14.1  11.5 - 14.5 % Final    Platelet Count 11/03/2023 342  150 - 400 K/uL Final    MPV 11/03/2023 9.9  9.4 - 12.4 fL Final    Neutrophils % 11/03/2023 45.8 (L)  53.0 - 65.0 % Final    Lymphocytes % 11/03/2023 46.4 (H)  27.0 - 41.0 % Final    Monocytes % 11/03/2023 6.5 (H)   2.0 - 6.0 % Final    Eosinophils % 11/03/2023 0.9 (L)  1.0 - 4.0 % Final    Basophils % 11/03/2023 0.2  0.0 - 1.0 % Final    Immature Granulocytes % 11/03/2023 0.2  0.0 - 0.4 % Final    nRBC, Auto 11/03/2023 0.0  <=0.0 % Final    Neutrophils, Abs 11/03/2023 1.98  1.80 - 7.70 K/uL Final    Lymphocytes, Absolute 11/03/2023 2.01  1.00 - 4.80 K/uL Final    Monocytes, Absolute 11/03/2023 0.28  0.00 - 0.80 K/uL Final    Eosinophils, Absolute 11/03/2023 0.04  0.00 - 0.50 K/uL Final    Basophils, Absolute 11/03/2023 0.01  0.00 - 0.20 K/uL Final    Immature Granulocytes, Absolute 11/03/2023 0.01  0.00 - 0.04 K/uL Final    nRBC, Absolute 11/03/2023 0.00  <=0.00 x10e3/uL Final    Diff Type 11/03/2023 Auto   Final         Orders Placed This Encounter   Procedures    POCT Urine Drug Screen Presump     Interpretive Information:     Negative:  No drug detected at the cut off level.   Positive:  This result represents presumptive positive for the   tested drug, other substances may yield a positive response other   than the analyte of interest. This result should be utilized for   diagnostic purpose only. Confirmation testing will be performed upon physician request only.            Requested Prescriptions     Signed Prescriptions Disp Refills    acetaminophen-codeine 300-30mg (TYLENOL #3) 300-30 mg Tab 60 tablet 0     Sig: Take 1 tablet by mouth every 12 (twelve) hours as needed (pain).    acetaminophen-codeine 300-30mg (TYLENOL #3) 300-30 mg Tab 60 tablet 0     Sig: Take 1 tablet by mouth every 12 (twelve) hours as needed (pain).       Assessment:     1. Lumbar radiculopathy, chronic    2. Chronic pain of both knees    3. Polyarthralgia    4. Sacroiliitis    5. Encounter for long-term (current) drug use         A's of Opioid Risk Assessment  Activity:Patient can perform ADL.   Analgesia:Patients pain is partially controlled by current medication. Patient has tried OTC medications such as Tylenol and Ibuprofen with out relief.    Adverse Effects: Patient denies constipation or sedation.  Aberrant Behavior:  reviewed with no aberrant drug seeking/taking behavior.  Overdose reversal drug naloxone discussed     Drug screen reviewed    MRI right shoulder [facility] November 21, 2023  Moderate acromioclavicular joint osteoarthrosis.  Partial tearing supraspinatus, infraspinatus and subscapularis tendons.  Mild-to-moderate tendinosis intra-articular biceps tendon.  Small amount of fluid in the subacromial subdeltoid bursa, small nonvisualized full-thickness tear could be present.       MRI lumbar spine [facility] November 30, 2022 L3/4 far lateral disc bulge right greater than left multiple level degenerative changes     X-ray bilateral knees [facility] November 30, 2022 degenerative changes no fracture noted    X-ray lumbar spine [facility] November 30, 2022 degenerative changes no fracture noted    X-ray hips [facility] November 30, 2022 degenerative changes no fracture noted          Plan:    Narcan January 2023    Follows orthopedics [facility]      November 9, 2023 definitive drug screen returned negative no benzodiazepine no buprenorphine  Last refill date buprenorphine patch August 9, 2023 drug screen should have been negative      She had bilateral sacroiliac injection # 2, June 6, 2023  she states she had 50% relief after procedure, his procedure did help improve her level of function     She had lumbar L3/4 CARMEN # 1 December 29, 2022 she states she would 80% relief initially, maintain 50% relief with time, she states procedure did help improve her level function       Follow-up after lumbar L3/4 CARMEN # 2 January 23, 2024  She states she had 80% relief after procedure, procedure did help improve her level function    She states current medications helping control her chronic joint back pain at this time she would like to continue conservative management    Continue home exercise program as directed     Follow-up 2  months    Dr. Walters January 2024

## 2024-02-06 ENCOUNTER — OFFICE VISIT (OUTPATIENT)
Dept: PAIN MEDICINE | Facility: CLINIC | Age: 39
End: 2024-02-06
Payer: COMMERCIAL

## 2024-02-06 VITALS
WEIGHT: 148 LBS | RESPIRATION RATE: 16 BRPM | HEART RATE: 77 BPM | HEIGHT: 66 IN | SYSTOLIC BLOOD PRESSURE: 123 MMHG | BODY MASS INDEX: 23.78 KG/M2 | DIASTOLIC BLOOD PRESSURE: 63 MMHG

## 2024-02-06 DIAGNOSIS — M46.1 SACROILIITIS: Chronic | ICD-10-CM

## 2024-02-06 DIAGNOSIS — G89.29 CHRONIC PAIN OF BOTH KNEES: Chronic | ICD-10-CM

## 2024-02-06 DIAGNOSIS — M54.16 LUMBAR RADICULOPATHY, CHRONIC: Primary | Chronic | ICD-10-CM

## 2024-02-06 DIAGNOSIS — M25.562 CHRONIC PAIN OF BOTH KNEES: Chronic | ICD-10-CM

## 2024-02-06 DIAGNOSIS — M25.561 CHRONIC PAIN OF BOTH KNEES: Chronic | ICD-10-CM

## 2024-02-06 DIAGNOSIS — Z79.899 ENCOUNTER FOR LONG-TERM (CURRENT) DRUG USE: ICD-10-CM

## 2024-02-06 DIAGNOSIS — M25.50 POLYARTHRALGIA: Chronic | ICD-10-CM

## 2024-02-06 LAB
CTP QC/QA: YES
POC (AMP) AMPHETAMINE: NEGATIVE
POC (BAR) BARBITURATES: NEGATIVE
POC (BUP) BUPRENORPHINE: NEGATIVE
POC (BZO) BENZODIAZEPINES: NEGATIVE
POC (COC) COCAINE: NEGATIVE
POC (MDMA) METHYLENEDIOXYMETHAMPHETAMINE 3,4: NEGATIVE
POC (MET) METHAMPHETAMINE: NEGATIVE
POC (MOP) OPIATES: ABNORMAL
POC (MTD) METHADONE: NEGATIVE
POC (OXY) OXYCODONE: NEGATIVE
POC (PCP) PHENCYCLIDINE: NEGATIVE
POC (TCA) TRICYCLIC ANTIDEPRESSANTS: NEGATIVE
POC TEMPERATURE (URINE): 92
POC THC: NEGATIVE

## 2024-02-06 PROCEDURE — 3074F SYST BP LT 130 MM HG: CPT | Mod: CPTII,,, | Performed by: PHYSICIAN ASSISTANT

## 2024-02-06 PROCEDURE — 99214 OFFICE O/P EST MOD 30 MIN: CPT | Mod: PBBFAC | Performed by: PHYSICIAN ASSISTANT

## 2024-02-06 PROCEDURE — 80305 DRUG TEST PRSMV DIR OPT OBS: CPT | Mod: PBBFAC | Performed by: PHYSICIAN ASSISTANT

## 2024-02-06 PROCEDURE — 3008F BODY MASS INDEX DOCD: CPT | Mod: CPTII,,, | Performed by: PHYSICIAN ASSISTANT

## 2024-02-06 PROCEDURE — 1159F MED LIST DOCD IN RCRD: CPT | Mod: CPTII,,, | Performed by: PHYSICIAN ASSISTANT

## 2024-02-06 PROCEDURE — 99999PBSHW POCT URINE DRUG SCREEN PRESUMP: Mod: PBBFAC,,,

## 2024-02-06 PROCEDURE — 3078F DIAST BP <80 MM HG: CPT | Mod: CPTII,,, | Performed by: PHYSICIAN ASSISTANT

## 2024-02-06 PROCEDURE — 99214 OFFICE O/P EST MOD 30 MIN: CPT | Mod: S$PBB,,, | Performed by: PHYSICIAN ASSISTANT

## 2024-02-06 RX ORDER — ACETAMINOPHEN AND CODEINE PHOSPHATE 300; 30 MG/1; MG/1
1 TABLET ORAL EVERY 12 HOURS PRN
Qty: 60 TABLET | Refills: 0 | Status: SHIPPED | OUTPATIENT
Start: 2024-02-08 | End: 2024-03-09

## 2024-02-06 RX ORDER — ACETAMINOPHEN AND CODEINE PHOSPHATE 300; 30 MG/1; MG/1
1 TABLET ORAL EVERY 12 HOURS PRN
Qty: 60 TABLET | Refills: 0 | Status: SHIPPED | OUTPATIENT
Start: 2024-03-09 | End: 2024-04-10 | Stop reason: SDUPTHER

## 2024-02-08 ENCOUNTER — CLINICAL SUPPORT (OUTPATIENT)
Dept: REHABILITATION | Facility: HOSPITAL | Age: 39
End: 2024-02-08
Attending: ORTHOPAEDIC SURGERY
Payer: COMMERCIAL

## 2024-02-08 DIAGNOSIS — R29.898 WEAKNESS OF RIGHT ARM: ICD-10-CM

## 2024-02-08 DIAGNOSIS — M25.511 ACUTE PAIN OF RIGHT SHOULDER: Primary | ICD-10-CM

## 2024-02-08 DIAGNOSIS — M25.611 DECREASED RANGE OF MOTION OF RIGHT SHOULDER: ICD-10-CM

## 2024-02-08 PROCEDURE — 97162 PT EVAL MOD COMPLEX 30 MIN: CPT

## 2024-02-08 NOTE — PLAN OF CARE
"[facility]   Physical Therapy Initial Evaluation      Name: Keshia Shepherd  Clinic Number: [id number]    Therapy Diagnosis: Right Shoulder Pain    Physician: Servando Henao III, MD    Physician Orders: PT Eval and Treat   Medical Diagnosis from Referral: Right Shoulder Pain   Evaluation Date: 2/8/2024  Authorization Period Expiration: Harjinder Nicholson   Plan of Care Expiration: 4/5/2024   Progress Note Due: As Needed   Visit # / Visits authorized: 1/ Harjinder Managed    FOTO: 41/100    Precautions: MRI shows tears to the Rotator Cuff      Time In: 3:25 pm   Time Out: 4:00 pm   Total Appointment Time (timed & untimed codes): 35 minutes    Subjective     Date of onset: 1/1/2024    History of current condition - Keshia reports: She has been having Right Shoulder pain for about a year that has worsened over time. She did have an MRI which showed : "Moderate acromioclavicular joint osteoarthrosis.  Partial tearing supraspinatus, infraspinatus and subscapularis tendons.  Mild-to-moderate tendinosis intra-articular biceps tendon.  Small amount of fluid in the subacromial subdeltoid bursa, small nonvisualized full-thickness tear could be present." She saw Dr Servando Henao 1/30/2024 to discuss the results of the MRI. MD is treating it conservatively with anti-inflammatories (Celebrex) and Physical Therapy. She is here today for her Outpatient Physical Therapy Evaluation.      Falls: None     Imaging: MRI studies:   Moderate acromioclavicular joint osteoarthrosis.  Partial tearing supraspinatus, infraspinatus and subscapularis tendons.  Mild-to-moderate tendinosis intra-articular biceps tendon.  Small amount of fluid in the subacromial subdeltoid bursa, small nonvisualized full-thickness tear could be present.    Prior Therapy: None   Social History:  lives with their family  Occupation: CNA at [email or web address]   Prior Level of Function: Able to use the Right hand/arm with no difficulty - works as a " "CNA pulling on patients  Current Level of Function: Difficulty using the Right Upper Extremity. Pain limits her ability to perform ADLs and Activities    Pain:  Current 8/10, worst 10/10, best 5/10   Location: right shoulder    Description: Aching, Throbbing, Grabbing, and Sharp  Aggravating Factors: Moving the Right arm   Easing Factors: relaxation, pain medication, and rest Tylenol 3     Patients goals: "I want to be able to use my arm and hurt less."     Medical History:   Past Medical History:   Diagnosis Date    Asthma     Cervical radiculopathy     Chronic pain syndrome     Depressive disorder     Fibromyalgia     GERD (gastroesophageal reflux disease)     Hyperlipidemia     Osteoarthritis     Palpitations     Radiculopathy of cervical region        Surgical History:   Keshia Shepherd  has a past surgical history that includes Hysterectomy; Carpal tunnel release (Bilateral); Epidural steroid injection (N/A, 12/29/2022); Injection of anesthetic agent into sacroiliac joint (Bilateral, 5/9/2023); Injection of anesthetic agent into sacroiliac joint (Bilateral, 6/6/2023); Epidural steroid injection (N/A, 9/5/2023); and Epidural steroid injection (N/A, 1/23/2024).    Medications:   Keshia has a current medication list which includes the following prescription(s): acetaminophen-codeine 300-30mg, [START ON 3/9/2024] acetaminophen-codeine 300-30mg, albuterol, buprenorphine 5 mcg/hour, celecoxib, mirtazapine, mupirocin, ondansetron, and pantoprazole, and the following Facility-Administered Medications: lidocaine-epinephrine 1%-1:100,000.    Allergies:   Review of patient's allergies indicates:   Allergen Reactions    Opioids - morphine analogues     Pcn [penicillins]     Pineapple Rash        Objective          Observation : She keeps the Right arm tucked close to her body due to pain     Forward Head/Rounded Shoulders :  [x] Yes   [] No   Scapular Winging :  [] Yes   [x] No     Comments :       Range of " Motion/Strength :                  Left Extremity                                                                        Right Extremity     AROM   PROM  Strength                  Location   AROM    PROM   Strength    Full     5/5   Shoulder    Flexion  80  120  3-/5                           Abduction  60  110                            Scaption                              ER in Adduction  40                             IR in Adduction                              Functional IR  L5                             ER in 90 Scaption                              IR in 90 Scaption       Full    5/5   Elbow         Flexion  Full    3+/5                           Extension    4+/5                           Pron/Supination          Functional Impairments : Difficulty using the Right Upper Extremity for ADLs and activities. Unable to lift the Right arm above her head.    Ligamentous Stability : Possible damage at the AC joint    Rotator Cuff : Moderate acromioclavicular joint osteoarthrosis.  Partial tearing supraspinatus, infraspinatus and subscapularis tendons.  Small amount of fluid in the subacromial subdeltoid bursa, small nonvisualized full-thickness tear could be present.    Labrum : Unknown    Elbow : Mild-to-moderate tendinosis intra-articular biceps tendon.    Other : Patient positive for all impingement tests at this time due to increased swelling and inflammation inside the shoulder joint.          Limitation/Restriction for FOTO Intake Shoulder Survey    Therapist reviewed FOTO scores for Keshia Shepherd on 2/8/2024.   FOTO documents entered into [employer] - see Media section.    Limitation Score: 59%         Patient Education      Education provided:   - Discussed the findings from the Evaluation and Reviewed the Plan of Care.    Assessment     Keshia is a 39 y.o. female referred to outpatient Physical Therapy with a medical diagnosis of Right Shoulder Pain. Keshia reports: She has been having Right Shoulder pain  "for about a year that has worsened over time. She did have an MRI which showed : "Moderate acromioclavicular joint osteoarthrosis.  Partial tearing supraspinatus, infraspinatus and subscapularis tendons.  Mild-to-moderate tendinosis intra-articular biceps tendon.  Small amount of fluid in the subacromial subdeltoid bursa, small nonvisualized full-thickness tear could be present." She saw Dr Servando Henao 1/30/2024 to discuss the results of the MRI. MD is treating it conservatively with anti-inflammatories (Celebrex) and Physical Therapy. She is here today for her Outpatient Physical Therapy Evaluation.    Patient presents with significantly decreased Right Shoulder Range of Motion and Strength.  Difficulty using the Right Upper Extremity for ADLs and activities. Unable to lift the Right arm above her head. Patient was positive for all impingement tests at this time due to increased swelling and inflammation inside the shoulder joint. She has pain in the typical "painful arc" pattern and has pain with attempts at shoulder elevation.   Patient will require Physical Therapy Intervention to address all deficits and work toward completion of all goals set. Therapist will refer patient back to MD upon completion of Therapy for further assessment and possible surgery if pain and dysfunction persist.      Patient prognosis is Fair.   Patient will benefit from skilled outpatient Physical Therapy to address the deficits stated above and in the chart below, provide patient /family education, and to maximize patientt's level of independence.     Plan of care discussed with patient: Yes  Patient's spiritual, cultural and educational needs considered and patient is agreeable to the plan of care and goals as stated below:     Anticipated Barriers for therapy: MRI shows tears to Supraspinatus, Infraspinatus, Subscapularis, and tendinosis of the long head of biceps    Medical Necessity is demonstrated by the " following  History  Co-morbidities and personal factors that may impact the plan of care [] LOW: no personal factors / co-morbidities  [x] MODERATE: 1-2 personal factors / co-morbidities  [] HIGH: 3+ personal factors / co-morbidities    Moderate / High Support Documentation:   Co-morbidities affecting plan of care:  MRI shows tears to Supraspinatus, Infraspinatus, Subscapularis, and tendinosis of the long head of biceps  Asthma   Cervical radiculopathy   Chronic pain syndrome   Depressive disorder   Fibromyalgia   GERD (gastroesophageal reflux disease)   Hyperlipidemia   Osteoarthritis   Palpitations   Radiculopathy of cervical region       Personal Factors:   no deficits     Examination  Body Structures and Functions, activity limitations and participation restrictions that may impact the plan of care [] LOW: addressing 1-2 elements  [x] MODERATE: 3+ elements  [] HIGH: 4+ elements (please support below)    Moderate / High Support Documentation: Right shoulder weakness, Right shoulder pain, decreased Range of Motion Right shoulder, difficulty using Right Upper Extremity for ADLs and activities     Clinical Presentation [] LOW: stable  [x] MODERATE: Evolving - Will likely require additional medical intervention such as surgery at the completion of Physical Therapy to determine the exact cause of patient's pain and dysfunction    [] HIGH: Unstable     Decision Making/ Complexity Score: moderate       Goals :   Short Term Goals   Independent with Home Exercise Program   Increase Right Shoulder Flexion and Abduction Active Range of Motion to 120 degrees   Increase Right Shoulder Internal Rotation and External Rotation Range of Motion to 60 degrees   Increase Right Shoulder Strength to 3+/5   Decrease complaints of Right Shoulder Pain to 4/10 with Activity     Long Term Goals :  1.Patient will perform 30 minutes of Activity with use of Right Shoulder with Pain Less than or Equal to 3/10  2. Increase Active Range of  Motion to Within Normal Limits   3. Increase Right Shoulder and Scapular Strength to 4/5       Plan     Plan of care Certification: 2/8/2024 to 4/5/2024.    Outpatient Physical Therapy 2 times weekly for 8 weeks to include the following interventions: Electrical Stimulation to decrease pain and inflammation as able, Manual Therapy, Moist Heat/ Ice, Neuromuscular Re-ed, Patient Education, Therapeutic Activities, and Therapeutic Exercise.     PAL CHAPMAN, PT, DPT

## 2024-02-08 NOTE — PROGRESS NOTES
See Plan of Care     Sup Visit performed today with AGNES Bateman and AGNES Hernandez.  All goals and treatment plan reviewed. Will work toward completion of all goals set.     First Treatment May Include :     Shoulder Exercises       UBE    Pulleys    Corner Stretch     Cane Flexion on Wall     Cane Flexion off Wall     Cybex Rows - Close     Cybex Rows - Wide        Active Range of Motion :     Flexion      Abduction      External Rotation      IR- Wand behind back             Table Slides - Flex and Abd    Wall Slides - Flex and Abd         Isometrics - all directions     Shoulder Ext/Scap Ret with band             Serratus Punches     Supine Cane Flexion           Neuro Re-Education         Shoulder Int Rot and Ext Rot    Shoulder Flex and Abduction    Shoulder Ext/Scap Retraction    Horizontal Abduction    Bilateral External Rotation                        Shoulder Manual     PROM with End Range Stretch    Capsular Mobilizations     Scapular PNF    Deep Tissue/Myofascial            Shoulder Modalities

## 2024-02-12 ENCOUNTER — OFFICE VISIT (OUTPATIENT)
Dept: FAMILY MEDICINE | Facility: CLINIC | Age: 39
End: 2024-02-12
Payer: COMMERCIAL

## 2024-02-12 VITALS
TEMPERATURE: 98 F | OXYGEN SATURATION: 98 % | SYSTOLIC BLOOD PRESSURE: 118 MMHG | HEIGHT: 66 IN | HEART RATE: 88 BPM | WEIGHT: 148 LBS | BODY MASS INDEX: 23.78 KG/M2 | DIASTOLIC BLOOD PRESSURE: 70 MMHG | RESPIRATION RATE: 16 BRPM

## 2024-02-12 DIAGNOSIS — R05.1 ACUTE COUGH: ICD-10-CM

## 2024-02-12 DIAGNOSIS — J01.90 ACUTE NON-RECURRENT SINUSITIS, UNSPECIFIED LOCATION: Primary | ICD-10-CM

## 2024-02-12 DIAGNOSIS — J02.9 SORE THROAT: ICD-10-CM

## 2024-02-12 DIAGNOSIS — R51.9 NONINTRACTABLE HEADACHE, UNSPECIFIED CHRONICITY PATTERN, UNSPECIFIED HEADACHE TYPE: ICD-10-CM

## 2024-02-12 LAB
CTP QC/QA: YES
CTP QC/QA: YES
FLUAV AG NPH QL: NEGATIVE
FLUBV AG NPH QL: NEGATIVE
S PYO RRNA THROAT QL PROBE: NEGATIVE

## 2024-02-12 PROCEDURE — 99213 OFFICE O/P EST LOW 20 MIN: CPT | Mod: 25,,, | Performed by: NURSE PRACTITIONER

## 2024-02-12 PROCEDURE — 3008F BODY MASS INDEX DOCD: CPT | Mod: CPTII,,, | Performed by: NURSE PRACTITIONER

## 2024-02-12 PROCEDURE — 1159F MED LIST DOCD IN RCRD: CPT | Mod: CPTII,,, | Performed by: NURSE PRACTITIONER

## 2024-02-12 PROCEDURE — 87880 STREP A ASSAY W/OPTIC: CPT | Mod: QW,,, | Performed by: NURSE PRACTITIONER

## 2024-02-12 PROCEDURE — 96372 THER/PROPH/DIAG INJ SC/IM: CPT | Mod: ,,, | Performed by: NURSE PRACTITIONER

## 2024-02-12 PROCEDURE — 3074F SYST BP LT 130 MM HG: CPT | Mod: CPTII,,, | Performed by: NURSE PRACTITIONER

## 2024-02-12 PROCEDURE — 87804 INFLUENZA ASSAY W/OPTIC: CPT | Mod: 59,QW,, | Performed by: NURSE PRACTITIONER

## 2024-02-12 PROCEDURE — 3078F DIAST BP <80 MM HG: CPT | Mod: CPTII,,, | Performed by: NURSE PRACTITIONER

## 2024-02-12 RX ORDER — CHLOPHEDIANOL HCL AND PYRILAMINE MALEATE 12.5; 12.5 MG/5ML; MG/5ML
10 SOLUTION ORAL 3 TIMES DAILY
Qty: 473 ML | Refills: 0 | Status: SHIPPED | OUTPATIENT
Start: 2024-02-12 | End: 2024-02-17

## 2024-02-12 RX ORDER — PREDNISONE 20 MG/1
20 TABLET ORAL DAILY
Qty: 5 TABLET | Refills: 0 | Status: SHIPPED | OUTPATIENT
Start: 2024-02-12 | End: 2024-06-06

## 2024-02-12 RX ORDER — MIRTAZAPINE 30 MG/1
30 TABLET, FILM COATED ORAL NIGHTLY
Qty: 30 TABLET | Refills: 0 | Status: SHIPPED | OUTPATIENT
Start: 2024-02-12

## 2024-02-12 RX ORDER — AZITHROMYCIN 500 MG/1
500 TABLET, FILM COATED ORAL DAILY
Qty: 5 TABLET | Refills: 0 | Status: SHIPPED | OUTPATIENT
Start: 2024-02-12 | End: 2024-02-17

## 2024-02-12 RX ORDER — DEXAMETHASONE SODIUM PHOSPHATE 4 MG/ML
8 INJECTION, SOLUTION INTRA-ARTICULAR; INTRALESIONAL; INTRAMUSCULAR; INTRAVENOUS; SOFT TISSUE ONCE
Status: COMPLETED | OUTPATIENT
Start: 2024-02-12 | End: 2024-02-12

## 2024-02-12 RX ADMIN — DEXAMETHASONE SODIUM PHOSPHATE 8 MG: 4 INJECTION, SOLUTION INTRA-ARTICULAR; INTRALESIONAL; INTRAMUSCULAR; INTRAVENOUS; SOFT TISSUE at 10:02

## 2024-02-12 NOTE — LETTER
February 12, 2024      [facility]  [address] [place] [address]  Phone: [phone]  Fax: [phone]       Patient: Keshia Shepherd   YOB: 1985  Date of Visit: 02/12/2024    To Whom It May Concern:    Taylor Shepherd  was at [facility] on 02/12/2024. The patient may return to work/school on 2/14/2024 with no restrictions. If you have any questions or concerns, or if I can be of further assistance, please do not hesitate to contact me.    Sincerely,    Crystal Maxwell LPN

## 2024-02-12 NOTE — PROGRESS NOTES
Subjective:       Patient ID: Keshia Shepherd is a 39 y.o. female.    Chief Complaint: Headache, Nasal Congestion, and Sore Throat (Started feeling bad 2 days ago,had covid test at work this morning was negative.did flu and strep test here..)    Headache, Nasal Congestion, and Sore Throat (Started feeling bad 2 days ago,had covid test at work this morning was negative.did flu and strep test here..)      Headache   Associated symptoms include a sore throat. Pertinent negatives include no abdominal pain, back pain, coughing, dizziness, ear pain, eye pain, fever, nausea, neck pain, vomiting or weakness.   Sore Throat   Associated symptoms include congestion and headaches. Pertinent negatives include no abdominal pain, coughing, ear pain, neck pain, shortness of breath or vomiting.     Review of Systems   Constitutional:  Negative for appetite change, fatigue and fever.   HENT:  Positive for nasal congestion and sore throat. Negative for ear pain.    Eyes:  Negative for pain, discharge and itching.   Respiratory:  Negative for cough and shortness of breath.    Cardiovascular:  Negative for chest pain and leg swelling.   Gastrointestinal:  Negative for abdominal pain, change in bowel habit, nausea and vomiting.   Musculoskeletal:  Negative for back pain, gait problem and neck pain.   Integumentary:  Negative for rash and wound.   Allergic/Immunologic: Negative for immunocompromised state.   Neurological:  Positive for headaches. Negative for dizziness and weakness.   All other systems reviewed and are negative.        Objective:      Physical Exam  Vitals and nursing note reviewed.   Constitutional:       General: She is not in acute distress.     Appearance: Normal appearance. She is not ill-appearing, toxic-appearing or diaphoretic.   HENT:      Head: Normocephalic.      Right Ear: Tympanic membrane, ear canal and external ear normal.      Left Ear: Tympanic membrane, ear canal and external ear normal.      Nose:  Mucosal edema and congestion present. No rhinorrhea.      Right Turbinates: Swollen.      Left Turbinates: Swollen.      Mouth/Throat:      Mouth: Mucous membranes are moist.      Pharynx: Posterior oropharyngeal erythema present. No oropharyngeal exudate.   Eyes:      General: No scleral icterus.        Right eye: No discharge.         Left eye: No discharge.      Extraocular Movements: Extraocular movements intact.      Conjunctiva/sclera: Conjunctivae normal.      Pupils: Pupils are equal, round, and reactive to light.   Cardiovascular:      Rate and Rhythm: Normal rate and regular rhythm.      Pulses: Normal pulses.      Heart sounds: Normal heart sounds. No murmur heard.  Pulmonary:      Effort: Pulmonary effort is normal. No respiratory distress.      Breath sounds: Normal breath sounds. No wheezing, rhonchi or rales.   Musculoskeletal:         General: Normal range of motion.      Cervical back: Neck supple. No tenderness.   Lymphadenopathy:      Cervical: No cervical adenopathy.   Skin:     General: Skin is warm and dry.      Capillary Refill: Capillary refill takes less than 2 seconds.      Findings: No rash.   Neurological:      Mental Status: She is alert and oriented to person, place, and time.   Psychiatric:         Mood and Affect: Mood normal.         Behavior: Behavior normal.         Thought Content: Thought content normal.         Judgment: Judgment normal.            Assessment:       1. Acute non-recurrent sinusitis, unspecified location    2. Nonintractable headache, unspecified chronicity pattern, unspecified headache type    3. Sore throat    4. Acute cough        Plan:   Acute non-recurrent sinusitis, unspecified location  -     dexAMETHasone injection 8 mg  -     predniSONE (DELTASONE) 20 MG tablet; Take 1 tablet (20 mg total) by mouth once daily.  Dispense: 5 tablet; Refill: 0  -     azithromycin (ZITHROMAX) 500 MG tablet; Take 1 tablet (500 mg total) by mouth once daily. for 5 days   Dispense: 5 tablet; Refill: 0  -     pyrilamine-chlophedianoL (NINJACOF) 12.5-12.5 mg/5 mL Liqd; Take 10 mLs by mouth 3 (three) times daily. for 5 days  Dispense: 473 mL; Refill: 0    Nonintractable headache, unspecified chronicity pattern, unspecified headache type  -     POCT Influenza A/B Rapid Antigen    Sore throat  -     POCT rapid strep A    Acute cough  -     pyrilamine-chlophedianoL (NINJACOF) 12.5-12.5 mg/5 mL Liqd; Take 10 mLs by mouth 3 (three) times daily. for 5 days  Dispense: 473 mL; Refill: 0    Other orders  -     mirtazapine (REMERON) 30 MG tablet; Take 1 tablet (30 mg total) by mouth every evening.  Dispense: 30 tablet; Refill: 0         Risks, benefits, and side effects were discussed with the patient. All questions were answered to the fullest satisfaction of the patient, and pt verbalized understanding and agreement to treatment plan. Pt was to call with any new or worsening symptoms, or present to the ER

## 2024-02-13 ENCOUNTER — CLINICAL SUPPORT (OUTPATIENT)
Dept: REHABILITATION | Facility: HOSPITAL | Age: 39
End: 2024-02-13
Payer: COMMERCIAL

## 2024-02-13 DIAGNOSIS — R29.898 WEAKNESS OF RIGHT ARM: ICD-10-CM

## 2024-02-13 DIAGNOSIS — M25.611 DECREASED RANGE OF MOTION OF RIGHT SHOULDER: ICD-10-CM

## 2024-02-13 DIAGNOSIS — M25.511 ACUTE PAIN OF RIGHT SHOULDER: Primary | ICD-10-CM

## 2024-02-13 PROCEDURE — 97110 THERAPEUTIC EXERCISES: CPT | Mod: CQ

## 2024-02-13 PROCEDURE — 97140 MANUAL THERAPY 1/> REGIONS: CPT | Mod: CQ

## 2024-02-13 PROCEDURE — 97112 NEUROMUSCULAR REEDUCATION: CPT | Mod: CQ

## 2024-02-13 NOTE — PROGRESS NOTES
[facility]   Physical Therapy Treatment Note      Name: Keshia Shepherd  Clinic Number: [id number]  Physician: Servando Henao III, MD    Visit Date: 2/13/2024    Therapy Diagnosis: Right Shoulder Pain    Physician: Servando Henao III, MD     Physician Orders: PT Eval and Treat   Medical Diagnosis from Referral: Right Shoulder Pain   Evaluation Date: 2/8/2024  Authorization Period Expiration: Ambetter Managed   Plan of Care Expiration: 4/5/2024   Progress Note Due: As Needed   Visit # / Visits authorized: 2 / Ambetter Managed    FOTO: 41/100 ; Updated FOTO due near 3/10/2024  PTA Visit #: 1/5      Precautions: MRI shows tears to the Rotator Cuff       Time In: 1:01 pm   Time Out: 1:39 pm   Total Appointment Time (timed & untimed codes): 38 minutes    Subjective     Patient reports: 8/10 pain in R shoulder.  She was not compliant with home exercise program.  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 Pain limits her ability to perform ADLs and Activities    Objective      Objective Measures updated at progress report unless specified.     Treatment     Keshia received the treatments listed below:      therapeutic exercises to develop strength, endurance, ROM, and flexibility for 15 minutes including:  UBE  x 3 minutes   Pulleys  x 3 minutes   Corner Stretch   4x20 seconds   Cane Flexion on Wall   20x   Cane Flexion off Wall      Cybex Rows - Close      Cybex Rows - Wide           Active Range of Motion :      Flexion       Abduction       External Rotation       IR- Wand behind back   20x               Table Slides - Flex and Abd     Wall Slides - Flex and Abd            Isometrics - all directions      Shoulder Ext/Scap Ret with  "band                  Serratus Punches      Supine Cane Flexion                 manual therapy techniques: Joint mobilizations, Manual traction, and Myofacial release were applied to the: shoulder for 8 minutes, including:  PROM with End Range Stretch     Capsular Mobilizations      Scapular PNF     Deep Tissue/Myofascial                neuromuscular re-education activities to improve: Coordination, Kinesthetic, Sense, Proprioception, and Posture for 15 minutes. The following activities were included:        Shoulder Int Rot and Ext Rot     Shoulder Flex and Abduction     Shoulder Ext/Scap Retraction     Horizontal Abduction  red 20x    Bilateral External Rotation  red 20x   Sidelying ER  0# 30x   Supine IR/ER  2# 20x   Prone T's  20x   Prone Retraction/Extension  20x         Patient Education and Home Exercises       Education provided:   - HEP    Written Home Exercises Provided: yes. Exercises were reviewed and Keshia was able to demonstrate them prior to the end of the session.  Keshia demonstrated good  understanding of the education provided. See Electronic Medical Record under Patient Instructions for exercises provided during therapy sessions    Assessment     Pt has increased pain today and rates it 8/10 upon arrival. Added in light cuff/scapular strengthening/mobility exercises with fatigue and pain noted. Pt's PROM is WFL but painful at end ranges. Issued HEP to work on diligently. Will progress as able.       PMH:  Keshia is a 39 y.o. female referred to outpatient Physical Therapy with a medical diagnosis of Right Shoulder Pain. Keshia reports: She has been having Right Shoulder pain for about a year that has worsened over time. She did have an MRI which showed : "Moderate acromioclavicular joint osteoarthrosis.  Partial tearing supraspinatus, infraspinatus and subscapularis tendons.  Mild-to-moderate tendinosis intra-articular biceps tendon.  Small amount of fluid in the subacromial subdeltoid bursa, " "small nonvisualized full-thickness tear could be present." She saw Dr Servando Henao 1/30/2024 to discuss the results of the MRI. MD is treating it conservatively with anti-inflammatories (Celebrex) and Physical Therapy. She is here today for her Outpatient Physical Therapy Evaluation.    Patient presents with significantly decreased Right Shoulder Range of Motion and Strength.  Difficulty using the Right Upper Extremity for ADLs and activities. Unable to lift the Right arm above her head. Patient was positive for all impingement tests at this time due to increased swelling and inflammation inside the shoulder joint. She has pain in the typical "painful arc" pattern and has pain with attempts at shoulder elevation.   Patient will require Physical Therapy Intervention to address all deficits and work toward completion of all goals set. Therapist will refer patient back to MD upon completion of Therapy for further assessment and possible surgery if pain and dysfunction persist.      Keshia Is progressing well towards her goals.   Patient prognosis is Fair.     Patient will continue to benefit from skilled outpatient physical therapy to address the deficits listed in the problem list box on initial evaluation, provide pt/family education and to maximize pt's level of independence in the home and community environment.     Patient's spiritual, cultural and educational needs considered and pt agreeable to plan of care and goals.     Anticipated barriers to physical therapy: RC Tears    Goals: Short Term Goals   Independent with Home Exercise Program   Increase Right Shoulder Flexion and Abduction Active Range of Motion to 120 degrees   Increase Right Shoulder Internal Rotation and External Rotation Range of Motion to 60 degrees   Increase Right Shoulder Strength to 3+/5   Decrease complaints of Right Shoulder Pain to 4/10 with Activity      Long Term Goals :  1.Patient will perform 30 minutes of Activity with use of Right " Shoulder with Pain Less than or Equal to 3/10  2. Increase Active Range of Motion to Within Normal Limits   3. Increase Right Shoulder and Scapular Strength to 4/5     Plan     Plan of care Certification: 2/8/2024 to 4/5/2024.     Outpatient Physical Therapy 2 times weekly for 8 weeks to include the following interventions: Electrical Stimulation to decrease pain and inflammation as able, Manual Therapy, Moist Heat/ Ice, Neuromuscular Re-ed, Patient Education, Therapeutic Activities, and Therapeutic Exercise.     Jewel Ballard, PTA

## 2024-02-20 ENCOUNTER — CLINICAL SUPPORT (OUTPATIENT)
Dept: REHABILITATION | Facility: HOSPITAL | Age: 39
End: 2024-02-20
Payer: COMMERCIAL

## 2024-02-20 DIAGNOSIS — R29.898 WEAKNESS OF RIGHT ARM: ICD-10-CM

## 2024-02-20 DIAGNOSIS — M25.511 ACUTE PAIN OF RIGHT SHOULDER: Primary | ICD-10-CM

## 2024-02-20 DIAGNOSIS — M25.611 DECREASED RANGE OF MOTION OF RIGHT SHOULDER: ICD-10-CM

## 2024-02-20 PROCEDURE — 97110 THERAPEUTIC EXERCISES: CPT | Mod: CQ

## 2024-02-20 PROCEDURE — 97112 NEUROMUSCULAR REEDUCATION: CPT | Mod: CQ

## 2024-02-20 NOTE — PROGRESS NOTES
[facility]   Physical Therapy Treatment Note      Name: Keshia Shepherd  Clinic Number: [id number]  Physician: Servando Henao III, MD    Visit Date: 2/20/2024    Therapy Diagnosis: Right Shoulder Pain    Physician: Servando Henao III, MD     Physician Orders: PT Eval and Treat   Medical Diagnosis from Referral: Right Shoulder Pain   Evaluation Date: 2/8/2024  Authorization Period Expiration: [employer] Managed   Plan of Care Expiration: 4/5/2024   Progress Note Due: As Needed   Visit # / Visits authorized: 3 / [employer] Managed    FOTO: 41/100 ; Updated FOTO due near 3/10/2024  PTA Visit #: 2/5      Precautions: MRI shows tears to the Rotator Cuff       Time In: 1:05 pm   Time Out: 1:43 pm   Total Appointment Time (timed & untimed codes): 38 minutes    Subjective     Patient reports: 5/10 pain in R shoulder due to work yesterday.  She was compliant with home exercise program.  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 Pain limits her ability to perform ADLs and Activities    Objective      Objective Measures updated at progress report unless specified.     Treatment     Keshia received the treatments listed below:      therapeutic exercises to develop strength, endurance, ROM, and flexibility for 23 minutes including:  UBE  x 3 minutes   Pulleys  x 3 minutes   Corner Stretch   4x20 seconds   Cane Flexion on Wall   20x   Cane Flexion off Wall      Cybex Rows - Close      Cybex Rows - Wide           Active Range of Motion :      Flexion       Abduction       External Rotation       IR- Wand behind back   20x               Table Slides - Flex  with swiss ball, 10x10 sec   Wall Slides - Flex and Abd            Isometrics - all directions  "  3x30 sec   Shoulder Ext/Scap Ret with band                  Serratus Punches   20x   Supine Cane Flexion                 manual therapy techniques: Joint mobilizations, Manual traction, and Myofacial release were applied to the: shoulder for 0 minutes, including:  PROM with End Range Stretch     Capsular Mobilizations      Scapular PNF     Deep Tissue/Myofascial                neuromuscular re-education activities to improve: Coordination, Kinesthetic, Sense, Proprioception, and Posture for 15 minutes. The following activities were included:        Shoulder Int Rot and Ext Rot     Shoulder Flex and Abduction     Shoulder Ext/Scap Retraction  green band 30x   Horizontal Abduction  red 20x    Bilateral External Rotation  red 20x   Sidelying ER  0# 30x   Supine IR/ER  2# 20x   Prone T's  20x   Prone Retraction/Extension  20x         Patient Education and Home Exercises       Education provided:   - HEP    Written Home Exercises Provided: yes. Exercises were reviewed and Keshia was able to demonstrate them prior to the end of the session.  Keshia demonstrated good  understanding of the education provided. See Electronic Medical Record under Patient Instructions for exercises provided during therapy sessions    Assessment     Pt still has a mild amount of pain but subjectively less than previous session. RANGE OF MOTION looks good just painful at end ranges. Progressed cuff/scapular strengthening exercises with fatigue and pain noted. Educated pt to keep up with her HEP diligently. Will continue to progress towards established goals as able.       PMH:  Keshia is a 39 y.o. female referred to outpatient Physical Therapy with a medical diagnosis of Right Shoulder Pain. Keshia reports: She has been having Right Shoulder pain for about a year that has worsened over time. She did have an MRI which showed : "Moderate acromioclavicular joint osteoarthrosis.  Partial tearing supraspinatus, infraspinatus and subscapularis " "tendons.  Mild-to-moderate tendinosis intra-articular biceps tendon.  Small amount of fluid in the subacromial subdeltoid bursa, small nonvisualized full-thickness tear could be present." She saw Dr Servando Henao 1/30/2024 to discuss the results of the MRI. MD is treating it conservatively with anti-inflammatories (Celebrex) and Physical Therapy. She is here today for her Outpatient Physical Therapy Evaluation.    Patient presents with significantly decreased Right Shoulder Range of Motion and Strength.  Difficulty using the Right Upper Extremity for ADLs and activities. Unable to lift the Right arm above her head. Patient was positive for all impingement tests at this time due to increased swelling and inflammation inside the shoulder joint. She has pain in the typical "painful arc" pattern and has pain with attempts at shoulder elevation.   Patient will require Physical Therapy Intervention to address all deficits and work toward completion of all goals set. Therapist will refer patient back to MD upon completion of Therapy for further assessment and possible surgery if pain and dysfunction persist.      Keshia Is progressing well towards her goals.   Patient prognosis is Fair.     Patient will continue to benefit from skilled outpatient physical therapy to address the deficits listed in the problem list box on initial evaluation, provide pt/family education and to maximize pt's level of independence in the home and community environment.     Patient's spiritual, cultural and educational needs considered and pt agreeable to plan of care and goals.     Anticipated barriers to physical therapy: RC Tears    Goals: Short Term Goals   Independent with Home Exercise Program   Increase Right Shoulder Flexion and Abduction Active Range of Motion to 120 degrees   Increase Right Shoulder Internal Rotation and External Rotation Range of Motion to 60 degrees   Increase Right Shoulder Strength to 3+/5   Decrease complaints of " Right Shoulder Pain to 4/10 with Activity      Long Term Goals :  1.Patient will perform 30 minutes of Activity with use of Right Shoulder with Pain Less than or Equal to 3/10  2. Increase Active Range of Motion to Within Normal Limits   3. Increase Right Shoulder and Scapular Strength to 4/5     Plan     Plan of care Certification: 2/8/2024 to 4/5/2024.     Outpatient Physical Therapy 2 times weekly for 8 weeks to include the following interventions: Electrical Stimulation to decrease pain and inflammation as able, Manual Therapy, Moist Heat/ Ice, Neuromuscular Re-ed, Patient Education, Therapeutic Activities, and Therapeutic Exercise.     Jewel Ballard, PTA

## 2024-02-23 ENCOUNTER — CLINICAL SUPPORT (OUTPATIENT)
Dept: REHABILITATION | Facility: HOSPITAL | Age: 39
End: 2024-02-23
Payer: COMMERCIAL

## 2024-02-23 DIAGNOSIS — M25.511 ACUTE PAIN OF RIGHT SHOULDER: Primary | ICD-10-CM

## 2024-02-23 DIAGNOSIS — R29.898 WEAKNESS OF RIGHT ARM: ICD-10-CM

## 2024-02-23 DIAGNOSIS — M25.611 DECREASED RANGE OF MOTION OF RIGHT SHOULDER: ICD-10-CM

## 2024-02-23 PROCEDURE — 97010 HOT OR COLD PACKS THERAPY: CPT | Mod: CQ

## 2024-02-23 PROCEDURE — 97112 NEUROMUSCULAR REEDUCATION: CPT | Mod: CQ

## 2024-02-23 NOTE — PROGRESS NOTES
[facility]   Physical Therapy Treatment Note      Name: Keshia Shepherd  Clinic Number: [id number]  Physician: Servando Henao III, MD    Visit Date: 2/23/2024    Therapy Diagnosis: Right Shoulder Pain    Physician: Servando Henao III, MD     Physician Orders: PT Eval and Treat   Medical Diagnosis from Referral: Right Shoulder Pain   Evaluation Date: 2/8/2024  Authorization Period Expiration: [employer] Managed   Plan of Care Expiration: 4/5/2024   Progress Note Due: As Needed   Visit # / Visits authorized: 4 / [employer] Managed    FOTO: 41/100 ; Updated FOTO due near 3/10/2024  PTA Visit #: 3/5      Precautions: MRI shows tears to the Rotator Cuff       Time In: 7:53 am   Time Out: 8:35 pm   Total Appointment Time (timed & untimed codes): 20 billable minutes    Subjective     Patient reports: 5/10 pain in R shoulder due to work yesterday.  She was compliant with home exercise program.  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 Pain limits her ability to perform ADLs and Activities    Objective      Objective Measures updated at progress report unless specified.     Treatment     Keshia received the treatments listed below:      therapeutic exercises to develop strength, endurance, ROM, and flexibility for 2 minutes including:  UBE  x 3 minutes   Pulleys  x 3 minutes   Corner Stretch   4x20 seconds   Cane Flexion on Wall   20x   Cane Flexion off Wall      Cybex Rows - Close      Cybex Rows - Wide           Active Range of Motion :      Flexion       Abduction       External Rotation       IR- Wand behind back   20x               Table Slides - Flex  with swiss ball, 10x10 sec   Wall Slides - Flex and Abd            Isometrics - all  directions   3x30 sec   Shoulder Ext/Scap Ret with band                  Serratus Punches   20x   Supine Cane Flexion                 manual therapy techniques: Joint mobilizations, Manual traction, and Myofacial release were applied to the: shoulder for 0 minutes, including:  PROM with End Range Stretch     Capsular Mobilizations      Scapular PNF     Deep Tissue/Myofascial                neuromuscular re-education activities to improve: Coordination, Kinesthetic, Sense, Proprioception, and Posture for 8 minutes. The following activities were included:        Shoulder Int Rot and Ext Rot     Shoulder Flex and Abduction     Shoulder Ext/Scap Retraction  green band 30x   Horizontal Abduction  red 20x    Bilateral External Rotation  red 20x   Sidelying ER  0# 30x   Supine IR/ER  2# 20x   Prone T's  20x   Prone Retraction/Extension  20x         MHP x 10 mins at end of session      Patient Education and Home Exercises       Education provided:   - HEP    Written Home Exercises Provided: yes. Exercises were reviewed and Keshia was able to demonstrate them prior to the end of the session.  Keshia demonstrated good  understanding of the education provided. See Electronic Medical Record under Patient Instructions for exercises provided during therapy sessions    Assessment     Pt a little flared up today due to work all week and PT session earlier this week. Stayed the course with light cuff/scapular strengthening but pt had mild pain noted so we ended session a little early and put MHP on her shoulder to help calm this down. Pt is making good progress with her strength but pain remaining relatively unchanged. Educated pt to keep up with her HEP. Will progress as able. Time billed reflects time spent one on one with pt.       PMH:  Keshia is a 39 y.o. female referred to outpatient Physical Therapy with a medical diagnosis of Right Shoulder Pain. Keshia reports: She has been having Right Shoulder pain for about a year  "that has worsened over time. She did have an MRI which showed : "Moderate acromioclavicular joint osteoarthrosis.  Partial tearing supraspinatus, infraspinatus and subscapularis tendons.  Mild-to-moderate tendinosis intra-articular biceps tendon.  Small amount of fluid in the subacromial subdeltoid bursa, small nonvisualized full-thickness tear could be present." She saw Dr Servando Henao 1/30/2024 to discuss the results of the MRI. MD is treating it conservatively with anti-inflammatories (Celebrex) and Physical Therapy. She is here today for her Outpatient Physical Therapy Evaluation.    Patient presents with significantly decreased Right Shoulder Range of Motion and Strength.  Difficulty using the Right Upper Extremity for ADLs and activities. Unable to lift the Right arm above her head. Patient was positive for all impingement tests at this time due to increased swelling and inflammation inside the shoulder joint. She has pain in the typical "painful arc" pattern and has pain with attempts at shoulder elevation.   Patient will require Physical Therapy Intervention to address all deficits and work toward completion of all goals set. Therapist will refer patient back to MD upon completion of Therapy for further assessment and possible surgery if pain and dysfunction persist.      Keshia Is progressing well towards her goals.   Patient prognosis is Fair.     Patient will continue to benefit from skilled outpatient physical therapy to address the deficits listed in the problem list box on initial evaluation, provide pt/family education and to maximize pt's level of independence in the home and community environment.     Patient's spiritual, cultural and educational needs considered and pt agreeable to plan of care and goals.     Anticipated barriers to physical therapy: RC Tears    Goals: Short Term Goals   Independent with Home Exercise Program   Increase Right Shoulder Flexion and Abduction Active Range of Motion " to 120 degrees   Increase Right Shoulder Internal Rotation and External Rotation Range of Motion to 60 degrees   Increase Right Shoulder Strength to 3+/5   Decrease complaints of Right Shoulder Pain to 4/10 with Activity      Long Term Goals :  1.Patient will perform 30 minutes of Activity with use of Right Shoulder with Pain Less than or Equal to 3/10  2. Increase Active Range of Motion to Within Normal Limits   3. Increase Right Shoulder and Scapular Strength to 4/5     Plan     Plan of care Certification: 2/8/2024 to 4/5/2024.     Outpatient Physical Therapy 2 times weekly for 8 weeks to include the following interventions: Electrical Stimulation to decrease pain and inflammation as able, Manual Therapy, Moist Heat/ Ice, Neuromuscular Re-ed, Patient Education, Therapeutic Activities, and Therapeutic Exercise.     Jewel Ballard, PTA

## 2024-02-27 ENCOUNTER — OFFICE VISIT (OUTPATIENT)
Dept: GASTROENTEROLOGY | Facility: CLINIC | Age: 39
End: 2024-02-27
Payer: COMMERCIAL

## 2024-02-27 ENCOUNTER — CLINICAL SUPPORT (OUTPATIENT)
Dept: REHABILITATION | Facility: HOSPITAL | Age: 39
End: 2024-02-27
Payer: COMMERCIAL

## 2024-02-27 VITALS
DIASTOLIC BLOOD PRESSURE: 75 MMHG | HEART RATE: 88 BPM | SYSTOLIC BLOOD PRESSURE: 111 MMHG | BODY MASS INDEX: 24.27 KG/M2 | RESPIRATION RATE: 18 BRPM | HEIGHT: 66 IN | WEIGHT: 151 LBS

## 2024-02-27 DIAGNOSIS — R11.0 NAUSEA: ICD-10-CM

## 2024-02-27 DIAGNOSIS — R29.898 WEAKNESS OF RIGHT ARM: ICD-10-CM

## 2024-02-27 DIAGNOSIS — Z80.0 FAMILY HISTORY OF COLON CANCER: ICD-10-CM

## 2024-02-27 DIAGNOSIS — K21.9 GASTROESOPHAGEAL REFLUX DISEASE, UNSPECIFIED WHETHER ESOPHAGITIS PRESENT: ICD-10-CM

## 2024-02-27 DIAGNOSIS — R19.8 ALTERNATING CONSTIPATION AND DIARRHEA: ICD-10-CM

## 2024-02-27 DIAGNOSIS — R13.10 DYSPHAGIA, UNSPECIFIED TYPE: Primary | ICD-10-CM

## 2024-02-27 DIAGNOSIS — Z12.11 SCREENING FOR COLON CANCER: ICD-10-CM

## 2024-02-27 DIAGNOSIS — M25.511 ACUTE PAIN OF RIGHT SHOULDER: Primary | ICD-10-CM

## 2024-02-27 DIAGNOSIS — M25.611 DECREASED RANGE OF MOTION OF RIGHT SHOULDER: ICD-10-CM

## 2024-02-27 DIAGNOSIS — Z80.0 FAMILY HISTORY OF COLORECTAL CANCER: ICD-10-CM

## 2024-02-27 DIAGNOSIS — R53.83 FATIGUE, UNSPECIFIED TYPE: ICD-10-CM

## 2024-02-27 PROCEDURE — 3008F BODY MASS INDEX DOCD: CPT | Mod: CPTII,,,

## 2024-02-27 PROCEDURE — 97112 NEUROMUSCULAR REEDUCATION: CPT | Mod: CQ

## 2024-02-27 PROCEDURE — 1159F MED LIST DOCD IN RCRD: CPT | Mod: CPTII,,,

## 2024-02-27 PROCEDURE — 97010 HOT OR COLD PACKS THERAPY: CPT | Mod: CQ

## 2024-02-27 PROCEDURE — 1160F RVW MEDS BY RX/DR IN RCRD: CPT | Mod: CPTII,,,

## 2024-02-27 PROCEDURE — 99214 OFFICE O/P EST MOD 30 MIN: CPT | Mod: PBBFAC

## 2024-02-27 PROCEDURE — 99215 OFFICE O/P EST HI 40 MIN: CPT | Mod: S$PBB,,,

## 2024-02-27 PROCEDURE — 3078F DIAST BP <80 MM HG: CPT | Mod: CPTII,,,

## 2024-02-27 PROCEDURE — 3074F SYST BP LT 130 MM HG: CPT | Mod: CPTII,,,

## 2024-02-27 RX ORDER — ONDANSETRON 4 MG/1
4 TABLET, ORALLY DISINTEGRATING ORAL EVERY 6 HOURS PRN
Qty: 20 TABLET | Refills: 1 | Status: SHIPPED | OUTPATIENT
Start: 2024-02-27

## 2024-02-27 RX ORDER — PANTOPRAZOLE SODIUM 40 MG/1
40 TABLET, DELAYED RELEASE ORAL DAILY
Qty: 90 TABLET | Refills: 3 | Status: SHIPPED | OUTPATIENT
Start: 2024-02-27 | End: 2025-02-26

## 2024-02-27 NOTE — PROGRESS NOTES
[facility]   Physical Therapy Treatment Note      Name: Keshia Shepherd  Clinic Number: [id number]  Physician: Servando Henao III, MD    Visit Date: 2/27/2024    Therapy Diagnosis: Right Shoulder Pain    Physician: Servando Henao III, MD     Physician Orders: PT Eval and Treat   Medical Diagnosis from Referral: Right Shoulder Pain   Evaluation Date: 2/8/2024  Authorization Period Expiration: [employer] Managed   Plan of Care Expiration: 4/5/2024   Progress Note Due: As Needed   Visit # / Visits authorized: 5 / [employer] Managed    FOTO: 41/100 ; Updated FOTO due near 3/10/2024  PTA Visit #: 4/5      Precautions: MRI shows tears to the Rotator Cuff       Time In: 11:30 am   Time Out: 12:16 pm   Total Appointment Time (timed & untimed codes): 29 billable minutes    Subjective     Patient reports: 6-7/10 pain today  She was compliant with home exercise program.  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 Pain limits her ability to perform ADLs and Activities    Objective      Objective Measures updated at progress report unless specified.     Treatment     Keshia received the treatments listed below:      therapeutic exercises to develop strength, endurance, ROM, and flexibility for 5 minutes including:  UBE  x 3 minutes   Pulleys  x 3 minutes   Corner Stretch   4x20 seconds   Cane Flexion on Wall   20x   Cane Flexion off Wall      Cybex Rows - Close      Cybex Rows - Wide           Active Range of Motion :      Flexion       Abduction       External Rotation       IR- Wand behind back   20x               Table Slides - Flex  with swiss ball, 10x10 sec   Wall Slides - Flex and Abd              Shoulder Ext/Scap Ret with band                   Serratus Punches   wall slides, 20x   Supine Cane Flexion                 manual therapy techniques: Joint mobilizations, Manual traction, and Myofacial release were applied to the: shoulder for 0 minutes, including:  PROM with End Range Stretch     Capsular Mobilizations      Scapular PNF     Deep Tissue/Myofascial                neuromuscular re-education activities to improve: Coordination, Kinesthetic, Sense, Proprioception, and Posture for 23 minutes. The following activities were included:        Shoulder Int Rot and Ext Rot  ER, green band 20x   Shoulder Flex and Abduction     Shoulder Ext/Scap Retraction  green band 30x   Horizontal Abduction  red 20x    Bilateral External Rotation  red 20x   Sidelying ER  0# 30x   Supine IR/ER  2# 20x   Prone T's  20x   Prone Retraction/Extension  20x   Prone Rows  3# 30x   Rows High/Low  3 plates 20x each         MHP x 10 mins at end of session      Patient Education and Home Exercises       Education provided:   - HEP    Written Home Exercises Provided: yes. Exercises were reviewed and Keshia was able to demonstrate them prior to the end of the session.  Keshia demonstrated good  understanding of the education provided. See Electronic Medical Record under Patient Instructions for exercises provided during therapy sessions    Assessment     Pt still has some mild pain in her R shoulder. RANGE OF MOTION is WFL but pt has pain throughout most exercises just able to push through it. Pt has worked hard in PT and diligently shows up for her appts. Will continue to progress towards goals. Educated pt to keep up with her HEP diligently. If no change when pt has completed 6 weeks of PT, will refer back to MD for further testing. Time billed reflects time spent one on one with pt       PMH:  Keshia is a 39 y.o. female referred to outpatient Physical Therapy with a medical diagnosis of Right Shoulder Pain. Keshia reports: She has been having Right Shoulder pain for about a  "year that has worsened over time. She did have an MRI which showed : "Moderate acromioclavicular joint osteoarthrosis.  Partial tearing supraspinatus, infraspinatus and subscapularis tendons.  Mild-to-moderate tendinosis intra-articular biceps tendon.  Small amount of fluid in the subacromial subdeltoid bursa, small nonvisualized full-thickness tear could be present." She saw Dr Servando Henao 1/30/2024 to discuss the results of the MRI. MD is treating it conservatively with anti-inflammatories (Celebrex) and Physical Therapy. She is here today for her Outpatient Physical Therapy Evaluation.    Patient presents with significantly decreased Right Shoulder Range of Motion and Strength.  Difficulty using the Right Upper Extremity for ADLs and activities. Unable to lift the Right arm above her head. Patient was positive for all impingement tests at this time due to increased swelling and inflammation inside the shoulder joint. She has pain in the typical "painful arc" pattern and has pain with attempts at shoulder elevation.   Patient will require Physical Therapy Intervention to address all deficits and work toward completion of all goals set. Therapist will refer patient back to MD upon completion of Therapy for further assessment and possible surgery if pain and dysfunction persist.      Keshia Is progressing well towards her goals.   Patient prognosis is Fair.     Patient will continue to benefit from skilled outpatient physical therapy to address the deficits listed in the problem list box on initial evaluation, provide pt/family education and to maximize pt's level of independence in the home and community environment.     Patient's spiritual, cultural and educational needs considered and pt agreeable to plan of care and goals.     Anticipated barriers to physical therapy: RC Tears    Goals: Short Term Goals   Independent with Home Exercise Program   Increase Right Shoulder Flexion and Abduction Active Range of " Motion to 120 degrees   Increase Right Shoulder Internal Rotation and External Rotation Range of Motion to 60 degrees   Increase Right Shoulder Strength to 3+/5   Decrease complaints of Right Shoulder Pain to 4/10 with Activity      Long Term Goals :  1.Patient will perform 30 minutes of Activity with use of Right Shoulder with Pain Less than or Equal to 3/10  2. Increase Active Range of Motion to Within Normal Limits   3. Increase Right Shoulder and Scapular Strength to 4/5     Plan     Plan of care Certification: 2/8/2024 to 4/5/2024.     Outpatient Physical Therapy 2 times weekly for 8 weeks to include the following interventions: Electrical Stimulation to decrease pain and inflammation as able, Manual Therapy, Moist Heat/ Ice, Neuromuscular Re-ed, Patient Education, Therapeutic Activities, and Therapeutic Exercise.     Jewel Ballard, PTA

## 2024-02-27 NOTE — PROGRESS NOTES
Gastroenterology Clinic Note    Patient ID: [id number]   Referring MD: Fauzia Ty FNP   Chief Complaint:   Chief Complaint   Patient presents with    ne     Nausea, feels like something is stuck in throat       History of Present Illness   Keshia Shepherd is an 39 y.o. AAF who is referred for nausea. She reports chronic nausea and GERD. Previously managed with Zofran and Protonix but has been out of her medications for long period of time. She reports now she is experiencing dysphagia and bloating. Hx of dysphagia responding well to EGD w/ dilation in the past. Last EGD 5 yrs ago. Also reports abdominal cramping and alternating constipation and diarrhea. Denies any hematochezia or melena. Her mother has hx of CRC (dx age 40). Patient has never had screening.     Review of Systems   Constitutional:  Negative for weight loss.   Gastrointestinal:  Positive for abdominal pain, constipation, diarrhea, heartburn and nausea. Negative for blood in stool and melena.       Past Medical History      Past Medical History:   Diagnosis Date    Asthma     Cervical radiculopathy     Chronic pain syndrome     Depressive disorder     Fibromyalgia     GERD (gastroesophageal reflux disease)     Hyperlipidemia     Osteoarthritis     Palpitations     Radiculopathy of cervical region        Past Surgical History     Past Surgical History:   Procedure Laterality Date    CARPAL TUNNEL RELEASE Bilateral     EPIDURAL STEROID INJECTION N/A 12/29/2022    Procedure: Injection, Steroid, Epidural, L3/4;  Surgeon: Vanessa Walters MD;  Location: [facility];  Service: Pain Management;  Laterality: N/A;    EPIDURAL STEROID INJECTION N/A 9/5/2023    Procedure: Injection, Steroid, Epidural, L3/4;  Surgeon: Vanessa Walters MD;  Location: [facility];  Service: Pain Management;  Laterality: N/A;    EPIDURAL STEROID INJECTION N/A 1/23/2024    Procedure: Injection, Steroid, Epidural, L3/4;  Surgeon: Vanessa Walters MD;  Location: [facility]  [facility];  Service: Pain Management;  Laterality: N/A;    HYSTERECTOMY      INJECTION OF ANESTHETIC AGENT INTO SACROILIAC JOINT Bilateral 5/9/2023    Procedure: BLOCK, SACROILIAC JOINT;  Surgeon: Vanessa Walters MD;  Location: [facility];  Service: Pain Management;  Laterality: Bilateral;    INJECTION OF ANESTHETIC AGENT INTO SACROILIAC JOINT Bilateral 6/6/2023    Procedure: BLOCK, SACROILIAC JOINT;  Surgeon: Vanessa Walters MD;  Location: [facility];  Service: Pain Management;  Laterality: Bilateral;       Allergies     Review of patient's allergies indicates:   Allergen Reactions    Opioids - morphine analogues     Pcn [penicillins]     Pineapple Rash       Immunization History     There is no immunization history on file for this patient.    Past Family History      Family History   Problem Relation Age of Onset    Arthritis Mother     Cancer Mother     Depression Mother     Diabetes Mother     Heart disease Mother     Hypertension Mother     Stroke Mother     Asthma Daughter     Arthritis Maternal Grandmother     Cancer Maternal Grandmother     Depression Maternal Grandmother     Diabetes Maternal Grandmother     Hypertension Maternal Grandmother     Heart disease Maternal Grandfather     Hypertension Maternal Grandfather     Cancer Paternal Grandmother     Depression Paternal Grandmother     Diabetes Paternal Grandmother        Past Social History      Social History     Socioeconomic History    Marital status: Single   Tobacco Use    Smoking status: Never    Smokeless tobacco: Never   Substance and Sexual Activity    Alcohol use: Never    Drug use: Not Currently    Sexual activity: Yes     Partners: Male       Current Medications     Outpatient Medications Marked as Taking for the 2/27/24 encounter (Office Visit) with Dipika Morin FNP   Medication Sig Dispense Refill    acetaminophen-codeine 300-30mg (TYLENOL #3) 300-30 mg Tab Take 1 tablet by mouth every 12 (twelve) hours as needed  "(pain). 60 tablet 0    albuterol (PROVENTIL/VENTOLIN HFA) 90 mcg/actuation inhaler Inhale 2 puffs into the lungs every 6 (six) hours as needed.      celecoxib (CELEBREX) 200 MG capsule Take 1 capsule (200 mg total) by mouth 2 (two) times daily. 60 capsule 1    mirtazapine (REMERON) 30 MG tablet Take 1 tablet (30 mg total) by mouth every evening. 30 tablet 0     Current Facility-Administered Medications for the 2/27/24 encounter (Office Visit) with Dipika Morin FNP   Medication Dose Route Frequency Provider Last Rate Last Admin    LIDOcaine-EPINEPHrine 1%-1:100,000 injection 1 mL  1 mL Intradermal 1 time in Clinic/HOD Erika Torres MD            I have reviewed the current medications, allergies, vital signs, past medical and surgical history, family medical history, and social history for this encounter and agree with all findings.    OBJECTIVE    Physical Exam    /75   Pulse 88   Resp 18   Ht 5' 6" (1.676 m)   Wt 68.5 kg (151 lb)   BMI 24.37 kg/m²   GEN: Well appearing, cooperative, NAD  NECK: Supple, no LAD  CV: Normal rate  RESP: Unlabored  ABD: ND, no guarding  EXT: No clubbing, cyanosis, or edema  SKIN: Warm and dry  NEURO: AAO x4.     LABS    CBC (with or without Differential):   Lab Results   Component Value Date    WBC 4.53 12/14/2023    HGB 12.0 12/14/2023    HCT 39.4 12/14/2023    MCV 74.8 (L) 12/14/2023    MCH 22.8 (L) 12/14/2023    MCHC 30.5 (L) 12/14/2023    RDW 13.6 12/14/2023     12/14/2023    MPV 10.0 12/14/2023    NEUTOPHILPCT 55.4 12/14/2023    DIFFTYPE Scan Smear 12/14/2023     BMP/CMP:   Lab Results   Component Value Date     11/03/2023    K 3.9 11/03/2023     11/03/2023    CO2 31 11/03/2023    BUN 7 11/03/2023    CREATININE 0.89 11/03/2023     (H) 11/03/2023    CALCIUM 9.6 11/03/2023    ALBUMIN 4.1 11/03/2023    AST 8 (L) 11/03/2023    ALT 20 11/03/2023    ALKPHOS 127 (H) 11/03/2023        IMAGING  No pertinent imaging.     ASSESSMENT  Keshia Shepherd is a " 39 y.o. AAF with history of asthma, depression, fibromyalgia, GERD, hyperlipidemia, and osteoarthritis who is referred for nausea.    1. Dysphagia, unspecified type    2. Gastroesophageal reflux disease, unspecified whether esophagitis present    3. Alternating constipation and diarrhea    4. Nausea    5. Family history of colon cancer    6. Fatigue, unspecified type    7. Family history of colorectal cancer    8. Screening for colon cancer           PLAN    - Schedule EGD with possible dilation for dysphagia   - Restart Protonix 40 mg daily  - Zofran PRN for nausea   - Schedule colonoscopy for CRC screening; + FMH of CRC in mother  Patient Instructions   - I recommend a bowel cleanse with a gatorade miralax prep: take 20 mg of dulcolax orally and then mix 238 grams of miralax in 64 oz of your favorite zero sugar gatorade and drink over a 4 hour period on a day when you will be at home  - After your bowel cleanse, I recommend starting a daily fiber supplement with Citrucel or Fibercon (can purchase at your local pharmacy)  - I recommend that you also use Miralax 17 grams daily-to-twice daily as needed to have a regular, soft BM without straining - you can adjust how often you need miralax, but start with daily dosing and go from there  - I recommend drinking at least 60-80 ounces of water daily unless you have a medical condition that requires fluid restriction        No orders of the defined types were placed in this encounter.        The risks and benefits of my recommendations, as well as other treatment options were discussed with the patient today. All questions were answered.    45 minutes of total time spent on the encounter, which includes face to face time and non-face to face time preparing to see the patient (eg, review of tests), obtaining and/or reviewing separately obtained history, documenting clinical information in the electronic or other health record, Independently interpreting results (not  separately reported) and communicating results to the patient/family/caregiver, or care coordination (not separately reported).        Dipika Morin, FNP/ACNP  [facility]

## 2024-03-05 ENCOUNTER — TELEPHONE (OUTPATIENT)
Dept: REHABILITATION | Facility: HOSPITAL | Age: 39
End: 2024-03-05
Payer: COMMERCIAL

## 2024-03-05 NOTE — TELEPHONE ENCOUNTER
Called pt and spoke to her about missed PT session; pt unable to make today or tomorrow's session due to work. Will reschedule to Thursday at 9:15 am

## 2024-03-07 ENCOUNTER — CLINICAL SUPPORT (OUTPATIENT)
Dept: REHABILITATION | Facility: HOSPITAL | Age: 39
End: 2024-03-07
Payer: COMMERCIAL

## 2024-03-07 DIAGNOSIS — M25.511 ACUTE PAIN OF RIGHT SHOULDER: Primary | ICD-10-CM

## 2024-03-07 DIAGNOSIS — M25.611 DECREASED RANGE OF MOTION OF RIGHT SHOULDER: ICD-10-CM

## 2024-03-07 DIAGNOSIS — R29.898 WEAKNESS OF RIGHT ARM: ICD-10-CM

## 2024-03-07 PROCEDURE — 97112 NEUROMUSCULAR REEDUCATION: CPT | Mod: CQ

## 2024-03-07 NOTE — PROGRESS NOTES
[facility]   Physical Therapy Treatment Note      Name: Keshia Shepherd  Clinic Number: [id number]  Physician: Servando Henao III, MD    Visit Date: 3/7/2024    Therapy Diagnosis: Right Shoulder Pain    Physician: Servando Henao III, MD     Physician Orders: PT Eval and Treat   Medical Diagnosis from Referral: Right Shoulder Pain   Evaluation Date: 2/8/2024  Authorization Period Expiration: [employer] Managed   Plan of Care Expiration: 4/5/2024   Progress Note Due: As Needed   Visit # / Visits authorized: 5 / [employer] Managed    FOTO: 41/100 ; Updated FOTO due near 3/10/2024  PTA Visit #: 4/5      Precautions: MRI shows tears to the Rotator Cuff       Time In: 8:44 am   Time Out: 9:30 am   Total Appointment Time (timed & untimed codes): 10 billable minutes    Subjective     Patient reports: 6-7/10 pain today  She was compliant with home exercise program.  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 Pain limits her ability to perform ADLs and Activities    Objective      Objective Measures updated at progress report unless specified.     Treatment     Keshia received the treatments listed below:      therapeutic exercises to develop strength, endurance, ROM, and flexibility for 2 minutes including:  UBE  x 3 minutes   Pulleys  x 3 minutes   Corner Stretch   4x20 seconds   Cane Flexion on Wall   20x   Cane Flexion off Wall      Cybex Rows - Close      Cybex Rows - Wide           Active Range of Motion :      Flexion       Abduction       External Rotation       IR- Wand behind back   20x               Table Slides - Flex  with swiss ball, 10x10 sec   Wall Slides - Flex and Abd              Shoulder Ext/Scap Ret with band                   Serratus Punches   wall slides, 20x   Supine Cane Flexion                 manual therapy techniques: Joint mobilizations, Manual traction, and Myofacial release were applied to the: shoulder for 0 minutes, including:  PROM with End Range Stretch     Capsular Mobilizations      Scapular PNF     Deep Tissue/Myofascial                neuromuscular re-education activities to improve: Coordination, Kinesthetic, Sense, Proprioception, and Posture for 8 minutes. The following activities were included:        Shoulder Int Rot and Ext Rot  ER, green band 20x   Shoulder Flex and Abduction     Shoulder Ext/Scap Retraction  green band 30x   Horizontal Abduction  red 20x    Bilateral External Rotation  red 20x   Sidelying ER  0# 30x   Supine IR/ER  2# 20x   Prone T's  20x   Prone Retraction/Extension  20x   Prone Rows  3# 30x   Rows High/Low  3 plates 20x each         MHP x 10 mins at end of session      Patient Education and Home Exercises       Education provided:   - HEP    Written Home Exercises Provided: yes. Exercises were reviewed and Keshia was able to demonstrate them prior to the end of the session.  Keshia demonstrated good  understanding of the education provided. See Electronic Medical Record under Patient Instructions for exercises provided during therapy sessions    Assessment     Pt has some increased pain today with tingling down in lateral arm and elbow. Pt worked the past few days so just a little more sore and swollen. Pt has completed 4 weeks of PT so far and is working hard. Still having pain but RANGE OF MOTION is back to normal. If no change in two weeks time, will refer back to MD for further evaluation. Time billed reflects time spent one on one with pt.       PMH:  Keshia is a 39 y.o. female referred to outpatient Physical Therapy with a medical diagnosis of Right Shoulder Pain. Keshia reports: She has been having Right Shoulder pain for about a year that has worsened over time. She did have an  "MRI which showed : "Moderate acromioclavicular joint osteoarthrosis.  Partial tearing supraspinatus, infraspinatus and subscapularis tendons.  Mild-to-moderate tendinosis intra-articular biceps tendon.  Small amount of fluid in the subacromial subdeltoid bursa, small nonvisualized full-thickness tear could be present." She saw Dr Servando Henao 1/30/2024 to discuss the results of the MRI. MD is treating it conservatively with anti-inflammatories (Celebrex) and Physical Therapy. She is here today for her Outpatient Physical Therapy Evaluation.    Patient presents with significantly decreased Right Shoulder Range of Motion and Strength.  Difficulty using the Right Upper Extremity for ADLs and activities. Unable to lift the Right arm above her head. Patient was positive for all impingement tests at this time due to increased swelling and inflammation inside the shoulder joint. She has pain in the typical "painful arc" pattern and has pain with attempts at shoulder elevation.   Patient will require Physical Therapy Intervention to address all deficits and work toward completion of all goals set. Therapist will refer patient back to MD upon completion of Therapy for further assessment and possible surgery if pain and dysfunction persist.      Keshia Is progressing well towards her goals.   Patient prognosis is Fair.     Patient will continue to benefit from skilled outpatient physical therapy to address the deficits listed in the problem list box on initial evaluation, provide pt/family education and to maximize pt's level of independence in the home and community environment.     Patient's spiritual, cultural and educational needs considered and pt agreeable to plan of care and goals.     Anticipated barriers to physical therapy: RC Tears    Goals: Short Term Goals   Independent with Home Exercise Program   Increase Right Shoulder Flexion and Abduction Active Range of Motion to 120 degrees   Increase Right Shoulder " Internal Rotation and External Rotation Range of Motion to 60 degrees   Increase Right Shoulder Strength to 3+/5   Decrease complaints of Right Shoulder Pain to 4/10 with Activity      Long Term Goals :  1.Patient will perform 30 minutes of Activity with use of Right Shoulder with Pain Less than or Equal to 3/10  2. Increase Active Range of Motion to Within Normal Limits   3. Increase Right Shoulder and Scapular Strength to 4/5     Plan     Plan of care Certification: 2/8/2024 to 4/5/2024.     Outpatient Physical Therapy 2 times weekly for 8 weeks to include the following interventions: Electrical Stimulation to decrease pain and inflammation as able, Manual Therapy, Moist Heat/ Ice, Neuromuscular Re-ed, Patient Education, Therapeutic Activities, and Therapeutic Exercise.     Jewel Ballard, PTA

## 2024-03-12 ENCOUNTER — ANESTHESIA (OUTPATIENT)
Dept: GASTROENTEROLOGY | Facility: HOSPITAL | Age: 39
End: 2024-03-12
Payer: COMMERCIAL

## 2024-03-12 ENCOUNTER — HOSPITAL ENCOUNTER (OUTPATIENT)
Dept: GASTROENTEROLOGY | Facility: HOSPITAL | Age: 39
Discharge: HOME | End: 2024-03-12
Admitting: INTERNAL MEDICINE
Payer: COMMERCIAL

## 2024-03-12 ENCOUNTER — ANESTHESIA EVENT (OUTPATIENT)
Dept: GASTROENTEROLOGY | Facility: HOSPITAL | Age: 39
End: 2024-03-12
Payer: COMMERCIAL

## 2024-03-12 VITALS
OXYGEN SATURATION: 100 % | HEART RATE: 68 BPM | RESPIRATION RATE: 11 BRPM | SYSTOLIC BLOOD PRESSURE: 137 MMHG | DIASTOLIC BLOOD PRESSURE: 86 MMHG

## 2024-03-12 DIAGNOSIS — R13.10 DYSPHAGIA, UNSPECIFIED TYPE: ICD-10-CM

## 2024-03-12 PROCEDURE — 27000284 HC CANNULA NASAL: Performed by: NURSE ANESTHETIST, CERTIFIED REGISTERED

## 2024-03-12 PROCEDURE — 63600175 PHARM REV CODE 636 W HCPCS: Performed by: NURSE ANESTHETIST, CERTIFIED REGISTERED

## 2024-03-12 PROCEDURE — 43248 EGD GUIDE WIRE INSERTION: CPT | Mod: ,,, | Performed by: INTERNAL MEDICINE

## 2024-03-12 PROCEDURE — 43248 EGD GUIDE WIRE INSERTION: CPT | Performed by: INTERNAL MEDICINE

## 2024-03-12 PROCEDURE — 88342 IMHCHEM/IMCYTCHM 1ST ANTB: CPT | Mod: 26,,, | Performed by: PATHOLOGY

## 2024-03-12 PROCEDURE — 88305 TISSUE EXAM BY PATHOLOGIST: CPT | Mod: TC,SUR | Performed by: INTERNAL MEDICINE

## 2024-03-12 PROCEDURE — 37000009 HC ANESTHESIA EA ADD 15 MINS

## 2024-03-12 PROCEDURE — 88305 TISSUE EXAM BY PATHOLOGIST: CPT | Mod: 26,59,, | Performed by: PATHOLOGY

## 2024-03-12 PROCEDURE — 25000003 PHARM REV CODE 250: Performed by: NURSE ANESTHETIST, CERTIFIED REGISTERED

## 2024-03-12 PROCEDURE — 43239 EGD BIOPSY SINGLE/MULTIPLE: CPT | Mod: 59,,, | Performed by: INTERNAL MEDICINE

## 2024-03-12 PROCEDURE — 27201423 OPTIME MED/SURG SUP & DEVICES STERILE SUPPLY

## 2024-03-12 PROCEDURE — D9220A PRA ANESTHESIA: Mod: ,,, | Performed by: NURSE ANESTHETIST, CERTIFIED REGISTERED

## 2024-03-12 PROCEDURE — 37000008 HC ANESTHESIA 1ST 15 MINUTES

## 2024-03-12 PROCEDURE — 43239 EGD BIOPSY SINGLE/MULTIPLE: CPT | Mod: 59 | Performed by: INTERNAL MEDICINE

## 2024-03-12 RX ORDER — SODIUM CHLORIDE 0.9 % (FLUSH) 0.9 %
10 SYRINGE (ML) INJECTION EVERY 6 HOURS PRN
Status: DISCONTINUED | OUTPATIENT
Start: 2024-03-12 | End: 2024-03-13 | Stop reason: HOSPADM

## 2024-03-12 RX ORDER — SODIUM CHLORIDE, SODIUM LACTATE, POTASSIUM CHLORIDE, CALCIUM CHLORIDE 600; 310; 30; 20 MG/100ML; MG/100ML; MG/100ML; MG/100ML
INJECTION, SOLUTION INTRAVENOUS CONTINUOUS
Status: DISCONTINUED | OUTPATIENT
Start: 2024-03-12 | End: 2024-03-13 | Stop reason: HOSPADM

## 2024-03-12 RX ORDER — PROPOFOL 10 MG/ML
VIAL (ML) INTRAVENOUS
Status: DISCONTINUED | OUTPATIENT
Start: 2024-03-12 | End: 2024-03-12

## 2024-03-12 RX ORDER — LIDOCAINE HYDROCHLORIDE 20 MG/ML
INJECTION, SOLUTION EPIDURAL; INFILTRATION; INTRACAUDAL; PERINEURAL
Status: DISCONTINUED | OUTPATIENT
Start: 2024-03-12 | End: 2024-03-12

## 2024-03-12 RX ADMIN — PROPOFOL 50 MG: 10 INJECTION, EMULSION INTRAVENOUS at 12:03

## 2024-03-12 RX ADMIN — LIDOCAINE HYDROCHLORIDE 100 MG: 20 INJECTION, SOLUTION INTRAVENOUS at 12:03

## 2024-03-12 RX ADMIN — PROPOFOL 110 MG: 10 INJECTION, EMULSION INTRAVENOUS at 12:03

## 2024-03-12 RX ADMIN — PROPOFOL 30 MG: 10 INJECTION, EMULSION INTRAVENOUS at 12:03

## 2024-03-12 RX ADMIN — SODIUM CHLORIDE: 9 INJECTION, SOLUTION INTRAVENOUS at 12:03

## 2024-03-12 NOTE — ANESTHESIA PREPROCEDURE EVALUATION
03/12/2024  Keshia Shepherd is a 39 y.o., female.    Past Medical History:   Diagnosis Date    Asthma     Cervical radiculopathy     Chronic pain syndrome     Depressive disorder     Fibromyalgia     GERD (gastroesophageal reflux disease)     Hyperlipidemia     Osteoarthritis     Palpitations     Radiculopathy of cervical region        Past Surgical History:   Procedure Laterality Date    CARPAL TUNNEL RELEASE Bilateral     EPIDURAL STEROID INJECTION N/A 12/29/2022    Procedure: Injection, Steroid, Epidural, L3/4;  Surgeon: Vanessa Walters MD;  Location: [facility];  Service: Pain Management;  Laterality: N/A;    EPIDURAL STEROID INJECTION N/A 9/5/2023    Procedure: Injection, Steroid, Epidural, L3/4;  Surgeon: Vanessa Walters MD;  Location: [facility];  Service: Pain Management;  Laterality: N/A;    EPIDURAL STEROID INJECTION N/A 1/23/2024    Procedure: Injection, Steroid, Epidural, L3/4;  Surgeon: Vanessa Walters MD;  Location: [facility];  Service: Pain Management;  Laterality: N/A;    HYSTERECTOMY      INJECTION OF ANESTHETIC AGENT INTO SACROILIAC JOINT Bilateral 5/9/2023    Procedure: BLOCK, SACROILIAC JOINT;  Surgeon: Vanessa Walters MD;  Location: [facility];  Service: Pain Management;  Laterality: Bilateral;    INJECTION OF ANESTHETIC AGENT INTO SACROILIAC JOINT Bilateral 6/6/2023    Procedure: BLOCK, SACROILIAC JOINT;  Surgeon: Vanessa Walters MD;  Location: [facility];  Service: Pain Management;  Laterality: Bilateral;       Family History   Problem Relation Age of Onset    Arthritis Mother     Cancer Mother     Depression Mother     Diabetes Mother     Heart disease Mother     Hypertension Mother     Stroke Mother     Asthma Daughter     Arthritis Maternal Grandmother     Cancer Maternal Grandmother     Depression Maternal Grandmother     Diabetes  Maternal Grandmother     Hypertension Maternal Grandmother     Heart disease Maternal Grandfather     Hypertension Maternal Grandfather     Cancer Paternal Grandmother     Depression Paternal Grandmother     Diabetes Paternal Grandmother        Social History     Socioeconomic History    Marital status: Single   Tobacco Use    Smoking status: Never    Smokeless tobacco: Never   Substance and Sexual Activity    Alcohol use: Never    Drug use: Not Currently    Sexual activity: Yes     Partners: Male       Current Outpatient Medications   Medication Sig Dispense Refill    acetaminophen-codeine 300-30mg (TYLENOL #3) 300-30 mg Tab Take 1 tablet by mouth every 12 (twelve) hours as needed (pain). 60 tablet 0    albuterol (PROVENTIL/VENTOLIN HFA) 90 mcg/actuation inhaler Inhale 2 puffs into the lungs every 6 (six) hours as needed.      celecoxib (CELEBREX) 200 MG capsule Take 1 capsule (200 mg total) by mouth 2 (two) times daily. 60 capsule 1    mirtazapine (REMERON) 30 MG tablet Take 1 tablet (30 mg total) by mouth every evening. 30 tablet 0    ondansetron (ZOFRAN-ODT) 4 MG TbDL Take 1 tablet (4 mg total) by mouth every 6 (six) hours as needed (nausea). 20 tablet 1    pantoprazole (PROTONIX) 40 MG tablet Take 1 tablet (40 mg total) by mouth once daily. 90 tablet 3    predniSONE (DELTASONE) 20 MG tablet Take 1 tablet (20 mg total) by mouth once daily. (Patient not taking: Reported on 2/27/2024) 5 tablet 0     Current Facility-Administered Medications   Medication Dose Route Frequency Provider Last Rate Last Admin    LIDOcaine-EPINEPHrine 1%-1:100,000 injection 1 mL  1 mL Intradermal 1 time in Clinic/HOD Erika Torres MD           Review of patient's allergies indicates:   Allergen Reactions    Opioids - morphine analogues     Pcn [penicillins]     Pineapple Rash        Pre-op Assessment    I have reviewed the Patient Summary Reports.     I have reviewed the Nursing Notes. I have reviewed the NPO Status.   I have reviewed  the Medications.     Review of Systems  Anesthesia Hx:  No problems with previous Anesthesia                Cardiovascular:                hyperlipidemia                             Pulmonary:    Asthma                    Hepatic/GI:     GERD             Musculoskeletal:  Arthritis               Neurological:    Neuromuscular Disease,  Headaches                                 Psych:  Psychiatric History                  Physical Exam    Airway:  Mallampati: II   Mouth Opening: Normal  TM Distance: Normal  Tongue: Normal  Neck ROM: Normal ROM        Anesthesia Plan  Type of Anesthesia, risks & benefits discussed:    Anesthesia Type: Gen Natural Airway, MAC  Intra-op Monitoring Plan: Standard ASA Monitors  Post Op Pain Control Plan: multimodal analgesia  Induction:  IV  Informed Consent: Informed consent signed with the Patient and all parties understand the risks and agree with anesthesia plan.  All questions answered. Patient consented to blood products? Yes  ASA Score: 3  Day of Surgery Review of History & Physical: H&P Update referred to the surgeon/provider.I have interviewed and examined the patient. I have reviewed the patient's H&P dated:     Ready For Surgery From Anesthesia Perspective.     .

## 2024-03-12 NOTE — TRANSFER OF CARE
Anesthesia Transfer of Care Note    Patient: Keshia Shepherd    Procedure(s) Performed: EGD    Patient location: GI    Anesthesia Type: general    Transport from OR: Transported from OR on room air with adequate spontaneous ventilation. Continuous ECG monitoring in transport. Continuous SpO2 monitoring in transport    Post pain: adequate analgesia    Post assessment: no apparent anesthetic complications and tolerated procedure well    Post vital signs: stable    Level of consciousness: responds to stimulation and sedated    Nausea/Vomiting: no nausea/vomiting    Complications: none    Transfer of care protocol was followed      Last vitals: Visit Vitals  /71 (BP Location: Left arm, Patient Position: Lying)   Pulse 75   Resp (!) 21   SpO2 100%   Breastfeeding No

## 2024-03-12 NOTE — ANESTHESIA POSTPROCEDURE EVALUATION
Anesthesia Post Evaluation    Patient: Keshia Shepherd    Procedure(s) Performed: EGD    Final Anesthesia Type: general      Patient location during evaluation: GI PACU  Patient participation: Yes- Able to Participate  Level of consciousness: awake and alert  Post-procedure vital signs: reviewed and stable  Pain management: adequate  Airway patency: patent  CRISTOBAL mitigation strategies: Multimodal analgesia  PONV status at discharge: No PONV  Anesthetic complications: no      Cardiovascular status: stable  Respiratory status: unassisted  Hydration status: euvolemic  Follow-up not needed.              Vitals Value Taken Time   /86 03/12/24 1310   Temp  03/12/24 1533   Pulse 68 03/12/24 1310   Resp 11 03/12/24 1310   SpO2 100 % 03/12/24 1310         Event Time   Out of Recovery 13:10:00         Pain/Jack Score: Jack Score: 9 (3/12/2024 12:45 PM)

## 2024-03-12 NOTE — H&P
History & Physical - Short Stay  Gastroenterology      SUBJECTIVE:     Procedure: EGD    Chief Complaint/Indication for Procedure: Dysphagia    (Not in a hospital admission)      Review of patient's allergies indicates:   Allergen Reactions    Opioids - morphine analogues     Pcn [penicillins]     Pineapple Rash        Past Medical History:   Diagnosis Date    Asthma     Cervical radiculopathy     Chronic pain syndrome     Depressive disorder     Fibromyalgia     GERD (gastroesophageal reflux disease)     Hyperlipidemia     Osteoarthritis     Palpitations     Radiculopathy of cervical region      Past Surgical History:   Procedure Laterality Date    CARPAL TUNNEL RELEASE Bilateral     EPIDURAL STEROID INJECTION N/A 12/29/2022    Procedure: Injection, Steroid, Epidural, L3/4;  Surgeon: Vanessa Walters MD;  Location: [facility];  Service: Pain Management;  Laterality: N/A;    EPIDURAL STEROID INJECTION N/A 9/5/2023    Procedure: Injection, Steroid, Epidural, L3/4;  Surgeon: Vanessa Walters MD;  Location: [facility];  Service: Pain Management;  Laterality: N/A;    EPIDURAL STEROID INJECTION N/A 1/23/2024    Procedure: Injection, Steroid, Epidural, L3/4;  Surgeon: Vanessa Walters MD;  Location: [facility];  Service: Pain Management;  Laterality: N/A;    HYSTERECTOMY      INJECTION OF ANESTHETIC AGENT INTO SACROILIAC JOINT Bilateral 5/9/2023    Procedure: BLOCK, SACROILIAC JOINT;  Surgeon: Vanessa Walters MD;  Location: [facility];  Service: Pain Management;  Laterality: Bilateral;    INJECTION OF ANESTHETIC AGENT INTO SACROILIAC JOINT Bilateral 6/6/2023    Procedure: BLOCK, SACROILIAC JOINT;  Surgeon: Vanessa Walters MD;  Location: [facility];  Service: Pain Management;  Laterality: Bilateral;     Family History   Problem Relation Age of Onset    Arthritis Mother     Cancer Mother     Depression Mother     Diabetes Mother     Heart disease Mother     Hypertension Mother      Stroke Mother     Asthma Daughter     Arthritis Maternal Grandmother     Cancer Maternal Grandmother     Depression Maternal Grandmother     Diabetes Maternal Grandmother     Hypertension Maternal Grandmother     Heart disease Maternal Grandfather     Hypertension Maternal Grandfather     Cancer Paternal Grandmother     Depression Paternal Grandmother     Diabetes Paternal Grandmother      Social History     Tobacco Use    Smoking status: Never    Smokeless tobacco: Never   Substance Use Topics    Alcohol use: Never    Drug use: Not Currently         OBJECTIVE:     Vital Signs (Most Recent)  Pulse: 70 (03/12/24 1214)  Resp: 16 (03/12/24 1214)  BP: (!) 118/58 (03/12/24 1214)  SpO2: 100 % (03/12/24 1214)    Physical Exam:                                                       GENERAL:  Comfortable, in no acute distress.                                 HEENT EXAM:  Nonicteric.  No adenopathy.  Oropharynx is clear.               NECK:  Supple.                                                               LUNGS:  Clear.                                                               CARDIAC:  Regular rate and rhythm.  S1, S2.  No murmur.                      ABDOMEN:  Soft, positive bowel sounds, nontender.  No hepatosplenomegaly or masses.  No rebound or guarding.                                             EXTREMITIES:  No edema.     MENTAL STATUS:  Normal, alert and oriented.      ASSESSMENT/PLAN:     Assessment: Dysphagia    Plan: EGD

## 2024-03-12 NOTE — DISCHARGE INSTRUCTIONS
Procedure Date  3/12/24     Impression  Overall Impression:   The esophagus appeared normal.  Performed forceps biopsies in the middle third of the esophagus to rule out eosinophilic esophagitis  Dilated in the esophagus with Savary-Puma dilator  Performed forceps biopsies in the stomach to rule out H. pylori  The duodenal bulb and 2nd part of the duodenum appeared normal.     Recommendation  - Discharge patient to home  - Advance diet as tolerated  - Continue present medications  - Await pathology results  - Patient has a contact number available for emergencies. The signs and symptoms of potential delayed complications were discussed with the patient. Return to normal activities tomorrow. Written discharge instructions were provided to the patient.    No driving today, no operating heavy machinery, no signing any legal documents until tomorrow.    Drink lots of fluids, resume regular diet.  Take your normal medications.     Please call our office for any nausea, vomiting or abdominal pain.

## 2024-03-13 LAB
ESTROGEN SERPL-MCNC: NORMAL PG/ML
INSULIN SERPL-ACNC: NORMAL U[IU]/ML
LAB AP GROSS DESCRIPTION: NORMAL
LAB AP LABORATORY NOTES: NORMAL
T3RU NFR SERPL: NORMAL %

## 2024-03-25 DIAGNOSIS — Z12.11 SCREEN FOR COLON CANCER: ICD-10-CM

## 2024-03-25 DIAGNOSIS — Z80.0 FAMILY HISTORY OF COLON CANCER: Primary | ICD-10-CM

## 2024-03-26 RX ORDER — POLYETHYLENE GLYCOL 3350, SODIUM SULFATE ANHYDROUS, SODIUM BICARBONATE, SODIUM CHLORIDE, POTASSIUM CHLORIDE 236; 22.74; 6.74; 5.86; 2.97 G/4L; G/4L; G/4L; G/4L; G/4L
4 POWDER, FOR SOLUTION ORAL ONCE
Qty: 4000 ML | Refills: 0 | Status: SHIPPED | OUTPATIENT
Start: 2024-03-26 | End: 2024-03-26

## 2024-04-01 ENCOUNTER — HOSPITAL ENCOUNTER (OUTPATIENT)
Dept: GASTROENTEROLOGY | Facility: HOSPITAL | Age: 39
Discharge: HOME | End: 2024-04-01
Payer: COMMERCIAL

## 2024-04-01 ENCOUNTER — ANESTHESIA EVENT (OUTPATIENT)
Dept: GASTROENTEROLOGY | Facility: HOSPITAL | Age: 39
End: 2024-04-01
Payer: COMMERCIAL

## 2024-04-01 ENCOUNTER — ANESTHESIA (OUTPATIENT)
Dept: GASTROENTEROLOGY | Facility: HOSPITAL | Age: 39
End: 2024-04-01
Payer: COMMERCIAL

## 2024-04-01 VITALS
BODY MASS INDEX: 24.21 KG/M2 | SYSTOLIC BLOOD PRESSURE: 112 MMHG | HEART RATE: 60 BPM | TEMPERATURE: 98 F | RESPIRATION RATE: 15 BRPM | WEIGHT: 150 LBS | OXYGEN SATURATION: 100 % | DIASTOLIC BLOOD PRESSURE: 72 MMHG

## 2024-04-01 DIAGNOSIS — Z80.0 FAMILY HISTORY OF COLORECTAL CANCER: ICD-10-CM

## 2024-04-01 DIAGNOSIS — Z12.11 SCREENING FOR COLON CANCER: ICD-10-CM

## 2024-04-01 PROCEDURE — 37000008 HC ANESTHESIA 1ST 15 MINUTES

## 2024-04-01 PROCEDURE — G0105 COLORECTAL SCRN; HI RISK IND: HCPCS | Mod: ,,, | Performed by: INTERNAL MEDICINE

## 2024-04-01 PROCEDURE — 25000003 PHARM REV CODE 250: Performed by: NURSE ANESTHETIST, CERTIFIED REGISTERED

## 2024-04-01 PROCEDURE — G0105 COLORECTAL SCRN; HI RISK IND: HCPCS | Performed by: INTERNAL MEDICINE

## 2024-04-01 PROCEDURE — 63600175 PHARM REV CODE 636 W HCPCS: Performed by: NURSE ANESTHETIST, CERTIFIED REGISTERED

## 2024-04-01 PROCEDURE — 37000009 HC ANESTHESIA EA ADD 15 MINS

## 2024-04-01 PROCEDURE — D9220A PRA ANESTHESIA: Mod: ,,, | Performed by: NURSE ANESTHETIST, CERTIFIED REGISTERED

## 2024-04-01 PROCEDURE — 27000284 HC CANNULA NASAL: Performed by: NURSE ANESTHETIST, CERTIFIED REGISTERED

## 2024-04-01 RX ORDER — LIDOCAINE HYDROCHLORIDE 20 MG/ML
INJECTION, SOLUTION EPIDURAL; INFILTRATION; INTRACAUDAL; PERINEURAL
Status: DISCONTINUED | OUTPATIENT
Start: 2024-04-01 | End: 2024-04-01

## 2024-04-01 RX ORDER — PROPOFOL 10 MG/ML
VIAL (ML) INTRAVENOUS CONTINUOUS PRN
Status: DISCONTINUED | OUTPATIENT
Start: 2024-04-01 | End: 2024-04-01

## 2024-04-01 RX ADMIN — SODIUM CHLORIDE: 9 INJECTION, SOLUTION INTRAVENOUS at 12:04

## 2024-04-01 RX ADMIN — LIDOCAINE HYDROCHLORIDE 80 MG: 20 INJECTION, SOLUTION INTRAVENOUS at 12:04

## 2024-04-01 RX ADMIN — PROPOFOL 125 MCG/KG/MIN: 10 INJECTION, EMULSION INTRAVENOUS at 12:04

## 2024-04-01 NOTE — ANESTHESIA PREPROCEDURE EVALUATION
04/01/2024  Keshia Shepherd is a 39 y.o., female.  Patient Active Problem List   Diagnosis    Chronic pain syndrome    Chronic bilateral low back pain without sciatica    Myalgia    Chronic pain of both knees    Radiculopathy of cervical region    Dysuria    Flank pain    Vaginal discharge    Acute cystitis without hematuria    H/O excision of epidermal inclusion cyst    Epidermal inclusion cyst    HSV (herpes simplex virus) infection    HPV (human papilloma virus) infection    Keloid of skin    Vaginal high risk HPV DNA test positive    Vitamin D deficiency    Polyarthralgia    Lumbosacral radiculopathy    Lumbar radiculopathy, chronic    Sacroiliitis    Chronic right shoulder pain    Abnormal CBC    Folliculitis    Bruising    Acute pain of right shoulder    Decreased range of motion of right shoulder    Weakness of right arm    Nonintractable headache    Sore throat    Acute non-recurrent sinusitis    Acute cough     Past Medical History:   Diagnosis Date    Asthma     Cervical radiculopathy     Chronic pain syndrome     Depressive disorder     Fibromyalgia     GERD (gastroesophageal reflux disease)     Hyperlipidemia     Osteoarthritis     Palpitations     Radiculopathy of cervical region        Past Surgical History:   Procedure Laterality Date    CARPAL TUNNEL RELEASE Bilateral     EPIDURAL STEROID INJECTION N/A 12/29/2022    Procedure: Injection, Steroid, Epidural, L3/4;  Surgeon: Vanessa Walters MD;  Location: [facility];  Service: Pain Management;  Laterality: N/A;    EPIDURAL STEROID INJECTION N/A 9/5/2023    Procedure: Injection, Steroid, Epidural, L3/4;  Surgeon: Vanessa Walters MD;  Location: [facility];  Service: Pain Management;  Laterality: N/A;    EPIDURAL STEROID INJECTION N/A 1/23/2024    Procedure: Injection, Steroid, Epidural, L3/4;  Surgeon: Vanessa Walters MD;   Location: [facility];  Service: Pain Management;  Laterality: N/A;    HYSTERECTOMY      INJECTION OF ANESTHETIC AGENT INTO SACROILIAC JOINT Bilateral 5/9/2023    Procedure: BLOCK, SACROILIAC JOINT;  Surgeon: Vanessa Walters MD;  Location: [facility];  Service: Pain Management;  Laterality: Bilateral;    INJECTION OF ANESTHETIC AGENT INTO SACROILIAC JOINT Bilateral 6/6/2023    Procedure: BLOCK, SACROILIAC JOINT;  Surgeon: Vanessa Walters MD;  Location: [facility];  Service: Pain Management;  Laterality: Bilateral;       Family History   Problem Relation Age of Onset    Arthritis Mother     Cancer Mother     Depression Mother     Diabetes Mother     Heart disease Mother     Hypertension Mother     Stroke Mother     Asthma Daughter     Arthritis Maternal Grandmother     Cancer Maternal Grandmother     Depression Maternal Grandmother     Diabetes Maternal Grandmother     Hypertension Maternal Grandmother     Heart disease Maternal Grandfather     Hypertension Maternal Grandfather     Cancer Paternal Grandmother     Depression Paternal Grandmother     Diabetes Paternal Grandmother        Social History     Socioeconomic History    Marital status: Single   Tobacco Use    Smoking status: Never    Smokeless tobacco: Never   Substance and Sexual Activity    Alcohol use: Never    Drug use: Not Currently    Sexual activity: Yes     Partners: Male       Current Outpatient Medications   Medication Sig Dispense Refill    acetaminophen-codeine 300-30mg (TYLENOL #3) 300-30 mg Tab Take 1 tablet by mouth every 12 (twelve) hours as needed (pain). 60 tablet 0    albuterol (PROVENTIL/VENTOLIN HFA) 90 mcg/actuation inhaler Inhale 2 puffs into the lungs every 6 (six) hours as needed.      celecoxib (CELEBREX) 200 MG capsule Take 1 capsule (200 mg total) by mouth 2 (two) times daily. 60 capsule 1    mirtazapine (REMERON) 30 MG tablet Take 1 tablet (30 mg total) by mouth every evening. 30 tablet 0    ondansetron  (ZOFRAN-ODT) 4 MG TbDL Take 1 tablet (4 mg total) by mouth every 6 (six) hours as needed (nausea). 20 tablet 1    pantoprazole (PROTONIX) 40 MG tablet Take 1 tablet (40 mg total) by mouth once daily. 90 tablet 3    predniSONE (DELTASONE) 20 MG tablet Take 1 tablet (20 mg total) by mouth once daily. (Patient not taking: Reported on 2/27/2024) 5 tablet 0     Current Facility-Administered Medications   Medication Dose Route Frequency Provider Last Rate Last Admin    LIDOcaine-EPINEPHrine 1%-1:100,000 injection 1 mL  1 mL Intradermal 1 time in Clinic/HOD Erika Torres MD           Review of patient's allergies indicates:   Allergen Reactions    Opioids - morphine analogues     Pcn [penicillins]     Pineapple Rash         Pre-op Assessment    I have reviewed the Patient Summary Reports.     I have reviewed the Nursing Notes. I have reviewed the NPO Status.   I have reviewed the Medications.     Review of Systems  Anesthesia Hx:  No problems with previous Anesthesia                Social:  Smoker       Pulmonary:    Asthma       Asthma:               Hepatic/GI:     GERD      Gerd          Musculoskeletal:  Arthritis        Arthritis          Neurological:    Neuromuscular Disease,  Headaches      Dx of Headaches   Arthritis                         Neuromuscular Disease   Psych:  Psychiatric History                  Physical Exam  General: Well nourished, Alert, Oriented and Cooperative    Airway:  Mallampati: III   Mouth Opening: Normal  Neck ROM: Normal ROM    Dental:  Intact    Chest/Lungs:  Normal Respiratory Rate    Heart:  Rate: Normal        Anesthesia Plan  Type of Anesthesia, risks & benefits discussed:    Anesthesia Type: Gen Natural Airway, MAC  Intra-op Monitoring Plan: Standard ASA Monitors  Post Op Pain Control Plan: multimodal analgesia and IV/PO Opioids PRN  Induction:  IV  Informed Consent: Informed consent signed with the Patient and all parties understand the risks and agree with anesthesia plan.  All  questions answered. Patient consented to blood products? Yes  ASA Score: 3  Day of Surgery Review of History & Physical: I have interviewed and examined the patient. I have reviewed the patient's H&P dated: There are no significant changes.     Ready For Surgery From Anesthesia Perspective.     .

## 2024-04-01 NOTE — ANESTHESIA POSTPROCEDURE EVALUATION
Anesthesia Post Evaluation    Patient: Keshia Shepherd    Procedure(s) Performed: * Colonoscopy*    Final Anesthesia Type: general      Patient location during evaluation: GI PACU  Patient participation: Yes- Able to Participate  Level of consciousness: awake and alert  Post-procedure vital signs: reviewed and stable  Pain management: adequate  Airway patency: patent    PONV status at discharge: No PONV  Anesthetic complications: no      Cardiovascular status: blood pressure returned to baseline and hemodynamically stable  Respiratory status: spontaneous ventilation  Hydration status: euvolemic  Follow-up not needed.  Comments: Refer to nursing notes for pain/jyoti score upon discharge from recovery.              Vitals Value Taken Time   BP 92/67 04/01/24 1312   Temp 36.7 °C (98 °F) 04/01/24 1236   Pulse 64 04/01/24 1312   Resp 14 04/01/24 1312   SpO2 99 % 04/01/24 1312   Vitals shown include unvalidated device data.      Event Time   Out of Recovery 13:12:20         Pain/Jyoti Score: Jyoti Score: 10 (4/1/2024 12:49 PM)

## 2024-04-01 NOTE — DISCHARGE INSTRUCTIONS
Procedure Date  4/1/24     Impression  Overall Impression:   The cecum, ascending colon, transverse colon, descending colon, sigmoid colon and rectum appeared normal.     Recommendation  - Repeat colonoscopy in 5 years due to family history of colon cancer  - Discharge patient to home  - Advance diet as tolerated  - Continue present medications  - Patient has a contact number available for emergencies. The signs and symptoms of potential delayed complications were discussed with the patient. Return to normal activities tomorrow. Written discharge instructions were provided to the patient.

## 2024-04-01 NOTE — TRANSFER OF CARE
Anesthesia Transfer of Care Note    Patient: Keshia Shepherd    Procedure(s) Performed: * Colonoscopy *    Patient location: GI    Anesthesia Type: general    Transport from OR: Transported from OR on room air with adequate spontaneous ventilation. Continuous ECG monitoring in transport. Continuous SpO2 monitoring in transport    Post pain: adequate analgesia    Post assessment: no apparent anesthetic complications    Post vital signs: stable    Level of consciousness: sedated and responds to stimulation    Nausea/Vomiting: no nausea/vomiting    Complications: none    Transfer of care protocol was followedComments: Good SV continue, NAD, VSS, RTRN      Last vitals: Visit Vitals  BP (!) 96/50   Pulse 85   Temp 36.7 °C (98 °F)   Resp 18   Wt 68 kg (150 lb)   SpO2 100%   Breastfeeding No   BMI 24.21 kg/m²

## 2024-04-10 ENCOUNTER — OFFICE VISIT (OUTPATIENT)
Dept: PAIN MEDICINE | Facility: CLINIC | Age: 39
End: 2024-04-10
Payer: COMMERCIAL

## 2024-04-10 VITALS
HEIGHT: 66 IN | SYSTOLIC BLOOD PRESSURE: 108 MMHG | WEIGHT: 156 LBS | HEART RATE: 66 BPM | RESPIRATION RATE: 18 BRPM | DIASTOLIC BLOOD PRESSURE: 77 MMHG | BODY MASS INDEX: 25.07 KG/M2

## 2024-04-10 DIAGNOSIS — M25.561 CHRONIC PAIN OF BOTH KNEES: Chronic | ICD-10-CM

## 2024-04-10 DIAGNOSIS — M54.16 LUMBAR RADICULOPATHY, CHRONIC: Primary | Chronic | ICD-10-CM

## 2024-04-10 DIAGNOSIS — M25.562 CHRONIC PAIN OF BOTH KNEES: Chronic | ICD-10-CM

## 2024-04-10 DIAGNOSIS — M46.1 SACROILIITIS: Chronic | ICD-10-CM

## 2024-04-10 DIAGNOSIS — G89.29 CHRONIC PAIN OF BOTH KNEES: Chronic | ICD-10-CM

## 2024-04-10 DIAGNOSIS — M25.50 POLYARTHRALGIA: Chronic | ICD-10-CM

## 2024-04-10 PROCEDURE — 99214 OFFICE O/P EST MOD 30 MIN: CPT | Mod: S$PBB,,, | Performed by: PHYSICIAN ASSISTANT

## 2024-04-10 PROCEDURE — 3074F SYST BP LT 130 MM HG: CPT | Mod: CPTII,,, | Performed by: PHYSICIAN ASSISTANT

## 2024-04-10 PROCEDURE — 3078F DIAST BP <80 MM HG: CPT | Mod: CPTII,,, | Performed by: PHYSICIAN ASSISTANT

## 2024-04-10 PROCEDURE — 99214 OFFICE O/P EST MOD 30 MIN: CPT | Mod: PBBFAC | Performed by: PHYSICIAN ASSISTANT

## 2024-04-10 PROCEDURE — 3008F BODY MASS INDEX DOCD: CPT | Mod: CPTII,,, | Performed by: PHYSICIAN ASSISTANT

## 2024-04-10 PROCEDURE — 1159F MED LIST DOCD IN RCRD: CPT | Mod: CPTII,,, | Performed by: PHYSICIAN ASSISTANT

## 2024-04-10 RX ORDER — ACETAMINOPHEN AND CODEINE PHOSPHATE 300; 30 MG/1; MG/1
1 TABLET ORAL EVERY 12 HOURS PRN
Qty: 60 TABLET | Refills: 1 | Status: SHIPPED | OUTPATIENT
Start: 2024-04-10 | End: 2024-06-05

## 2024-04-10 NOTE — PROGRESS NOTES
Subjective:         Patient ID: Keshia Shepherd is a 39 y.o. female.    Chief Complaint: Low-back Pain, Hip Pain, and Shoulder Pain        Pain  This is a chronic problem. The current episode started more than 1 year ago. The problem occurs daily. The problem has been unchanged. Associated symptoms include arthralgias, myalgias and neck pain. Pertinent negatives include no anorexia, chest pain, chills, coughing, diaphoresis, fever, sore throat, vertigo or vomiting.     Review of Systems   Constitutional:  Negative for activity change, appetite change, chills, diaphoresis, fever and unexpected weight change.   HENT:  Negative for drooling, ear discharge, ear pain, facial swelling, nosebleeds, sore throat, trouble swallowing, voice change and goiter.    Eyes:  Negative for photophobia, pain, discharge, redness and visual disturbance.   Respiratory:  Negative for apnea, cough, choking, chest tightness, shortness of breath, wheezing and stridor.    Cardiovascular:  Negative for chest pain, palpitations and leg swelling.   Gastrointestinal:  Negative for abdominal distention, anorexia, diarrhea, rectal pain, vomiting and fecal incontinence.   Endocrine: Negative for cold intolerance, heat intolerance, polydipsia, polyphagia and polyuria.   Genitourinary:  Negative for bladder incontinence, dysuria, flank pain, frequency, hematuria and hot flashes.   Musculoskeletal:  Positive for arthralgias, back pain, leg pain, myalgias, neck pain and neck stiffness. Negative for gait problem and joint deformity.   Integumentary:  Negative for color change and pallor.   Allergic/Immunologic: Negative for immunocompromised state.   Neurological:  Negative for dizziness, vertigo, seizures, syncope, facial asymmetry, speech difficulty, light-headedness, memory loss and coordination difficulties.   Hematological:  Negative for adenopathy. Does not bruise/bleed easily.   Psychiatric/Behavioral:  Negative for agitation, behavioral  problems, confusion, decreased concentration, dysphoric mood, hallucinations, self-injury and suicidal ideas. The patient is not nervous/anxious and is not hyperactive.            Past Medical History:   Diagnosis Date    Asthma     Cervical radiculopathy     Chronic pain syndrome     Depressive disorder     Fibromyalgia     GERD (gastroesophageal reflux disease)     Hyperlipidemia     Osteoarthritis     Palpitations     Radiculopathy of cervical region      Past Surgical History:   Procedure Laterality Date    CARPAL TUNNEL RELEASE Bilateral     EPIDURAL STEROID INJECTION N/A 12/29/2022    Procedure: Injection, Steroid, Epidural, L3/4;  Surgeon: Vanessa Walters MD;  Location: [facility];  Service: Pain Management;  Laterality: N/A;    EPIDURAL STEROID INJECTION N/A 9/5/2023    Procedure: Injection, Steroid, Epidural, L3/4;  Surgeon: Vanessa Walters MD;  Location: [facility];  Service: Pain Management;  Laterality: N/A;    EPIDURAL STEROID INJECTION N/A 1/23/2024    Procedure: Injection, Steroid, Epidural, L3/4;  Surgeon: Vanessa Walters MD;  Location: [facility];  Service: Pain Management;  Laterality: N/A;    HYSTERECTOMY      INJECTION OF ANESTHETIC AGENT INTO SACROILIAC JOINT Bilateral 5/9/2023    Procedure: BLOCK, SACROILIAC JOINT;  Surgeon: Vanessa Walters MD;  Location: [facility];  Service: Pain Management;  Laterality: Bilateral;    INJECTION OF ANESTHETIC AGENT INTO SACROILIAC JOINT Bilateral 6/6/2023    Procedure: BLOCK, SACROILIAC JOINT;  Surgeon: Vanessa Walters MD;  Location: [facility];  Service: Pain Management;  Laterality: Bilateral;     Social History     Socioeconomic History    Marital status: Single   Tobacco Use    Smoking status: Never    Smokeless tobacco: Never   Substance and Sexual Activity    Alcohol use: Never    Drug use: Not Currently    Sexual activity: Yes     Partners: Male     Family History   Problem Relation Age of Onset     "Arthritis Mother     Cancer Mother     Depression Mother     Diabetes Mother     Heart disease Mother     Hypertension Mother     Stroke Mother     Asthma Daughter     Arthritis Maternal Grandmother     Cancer Maternal Grandmother     Depression Maternal Grandmother     Diabetes Maternal Grandmother     Hypertension Maternal Grandmother     Heart disease Maternal Grandfather     Hypertension Maternal Grandfather     Cancer Paternal Grandmother     Depression Paternal Grandmother     Diabetes Paternal Grandmother      Review of patient's allergies indicates:   Allergen Reactions    Opioids - morphine analogues     Pcn [penicillins]     Pineapple Rash        Objective:  Vitals:    04/10/24 1258   BP: 108/77   Pulse: 66   Resp: 18   Weight: 70.8 kg (156 lb)   Height: 5' 6" (1.676 m)   PainSc:   8           Physical Exam  Vitals and nursing note reviewed. Exam conducted with a chaperone present.   Constitutional:       General: She is awake. She is not in acute distress.     Appearance: Normal appearance. She is not ill-appearing, toxic-appearing or diaphoretic.   HENT:      Head: Normocephalic and atraumatic.      Nose: Nose normal.      Mouth/Throat:      Mouth: Mucous membranes are moist.      Pharynx: Oropharynx is clear.   Eyes:      Conjunctiva/sclera: Conjunctivae normal.      Pupils: Pupils are equal, round, and reactive to light.   Cardiovascular:      Rate and Rhythm: Normal rate.   Pulmonary:      Effort: Pulmonary effort is normal. No respiratory distress.   Abdominal:      Palpations: Abdomen is soft.   Musculoskeletal:         General: No deformity or signs of injury. Normal range of motion.      Cervical back: Normal range of motion and neck supple.   Skin:     General: Skin is warm and dry.   Neurological:      General: No focal deficit present.      Mental Status: She is alert and oriented to person, place, and time. Mental status is at baseline.      Cranial Nerves: No cranial nerve deficit (II-XII). "   Psychiatric:         Mood and Affect: Mood normal.         Behavior: Behavior normal. Behavior is cooperative.         Thought Content: Thought content normal.           Colonoscopy  Narrative: Table formatting from the original result was not included.  Procedure Date  4/1/24    Impression  Overall   Impression:    The cecum, ascending colon, transverse colon, descending colon, sigmoid   colon and rectum appeared normal.    Recommendation  - Repeat colonoscopy in 5 years due to family history of colon cancer  - Discharge patient to home  - Advance diet as tolerated  - Continue present medications  - Patient has a contact number available for emergencies. The signs and   symptoms of potential delayed complications were discussed with the   patient. Return to normal activities tomorrow. Written discharge   instructions were provided to the patient.    Indication  Screening for colon cancer, Family history of colorectal cancer    Providers  Trino Castillo, Vickie Casanova CRNA Technician   Blanca Karimi Technician   Memo Nolasco CRNA CRNA Roberts, Sonya Yvette, RN Registered Nurse   Haroldo Victor MD Proceduralist     Medications  General anesthesia - See anesthesia record.    Preprocedure  A history and physical has been performed, and patient medication   allergies have been reviewed. The patient's tolerance of previous   anesthesia has been reviewed. The risks and benefits of the procedure and   the sedation options and risks were discussed with the patient. All   questions were answered and informed consent obtained.    ASA Score: ASA 2 - Patient with mild systemic disease with no functional   limitations  Mallampati Airway Score: II (hard and soft palate, upper portion of   tonsils anduvula visible)    Details of the Procedure  The patient underwent general anesthesia, which was administered by an   anesthesia professional. The patient's heart rate, blood pressure, level   of  consciousness, respirations, oxygen, ECG and ETCO2 were monitored   throughout the procedure. A digital rectal exam was performed. A perianal   exam was performed. The scope was introduced through the anus and advanced   to the cecum. Retroflexion was performed in the rectum. The quality of   bowel preparation was evaluated using the Burdett Bowel Preparation Scale   with scores of: right colon = 3, transverse colon = 3, left colon = 3. The   total BBPS score was 9. Bowel prep was adequate. The patient's estimated   blood loss was minimal (<5 mL). The procedure was not difficult. The   patient tolerated the procedure well. There were no apparent adverse   events.     Scope: Pediatric Colonoscope  Scope Serial: [id number]    Events    Procedure Events   Event Event Time     Procedure Events   Event Event Time   ENDO SCOPE IN TIME 4/1/2024 12:20 PM   ENDO CECUM REACHED 4/1/2024 12:25 PM   ENDO SCOPE OUT TIME 4/1/2024 12:35 PM     CECAL WITHDRAWAL TIME: 9m 44s    Findings  The cecum, ascending colon, transverse colon, descending colon, sigmoid   colon and rectum appeared normal.         Hospital Outpatient Visit on 03/12/2024   Component Date Value Ref Range Status    Case Report 03/12/2024    Final                    Value:Surgical Pathology                                Case: [id number]                                   Authorizing Provider:  Haroldo Victor MD     Collected:           03/12/2024 12:33 PM          Ordering Location:     [facility] -         Received:            03/12/2024 01:16 PM                                 Endoscopy                                                                    Pathologist:           Maicol Rausch III, MD                                                                           Specimens:   A) - Stomach, a. gastric bx, r/o H.Pylori                                                            B) - Esophagus, b. mid esophageal bx, r/o EOE                                              Final Diagnosis 03/12/2024    Final                    Value:This result contains rich text formatting which cannot be displayed here.    Gross Description 03/12/2024    Final                    Value:This result contains rich text formatting which cannot be displayed here.    Microscopic Description 03/12/2024    Final                    Value:This result contains rich text formatting which cannot be displayed here.    Laboratory Notes 03/12/2024    Final                    Value:This result contains rich text formatting which cannot be displayed here.   Office Visit on 02/12/2024   Component Date Value Ref Range Status    Rapid Influenza A Ag 02/12/2024 Negative  Negative Final    Rapid Influenza B Ag 02/12/2024 Negative  Negative Final     Acceptable 02/12/2024 Yes   Final    Rapid Strep A Screen 02/12/2024 Negative  Negative Final     Acceptable 02/12/2024 Yes   Final   Office Visit on 02/06/2024   Component Date Value Ref Range Status    POC Amphetamines 02/06/2024 Negative  Negative, Inconclusive Final    POC Barbiturates 02/06/2024 Negative  Negative, Inconclusive Final    POC Benzodiazepines 02/06/2024 Negative  Negative, Inconclusive Final    POC Cocaine 02/06/2024 Negative  Negative, Inconclusive Final    POC THC 02/06/2024 Negative  Negative, Inconclusive Final    POC Methadone 02/06/2024 Negative  Negative, Inconclusive Final    POC Methamphetamine 02/06/2024 Negative  Negative, Inconclusive Final    POC Opiates 02/06/2024 Presumptive Positive (A)  Negative, Inconclusive Final    POC Oxycodone 02/06/2024 Negative  Negative, Inconclusive Final    POC Phencyclidine 02/06/2024 Negative  Negative, Inconclusive Final    POC Methylenedioxymethamphetamine * 02/06/2024 Negative  Negative, Inconclusive Final    POC Tricyclic Antidepressants 02/06/2024 Negative  Negative, Inconclusive Final     POC Buprenorphine 02/06/2024 Negative   Final     Acceptable 02/06/2024 Yes   Final    POC Temperature (Urine) 02/06/2024 92   Final   Office Visit on 12/14/2023   Component Date Value Ref Range Status    Iron 12/14/2023 74  50 - 170 µg/dL Final    Iron Saturation 12/14/2023 27  14 - 50 % Final    TIBC 12/14/2023 279  250 - 450 µg/dL Final    WBC 12/14/2023 4.53  4.50 - 11.00 K/uL Final    RBC 12/14/2023 5.27  4.20 - 5.40 M/uL Final    Hemoglobin 12/14/2023 12.0  12.0 - 16.0 g/dL Final    Hematocrit 12/14/2023 39.4  38.0 - 47.0 % Final    MCV 12/14/2023 74.8 (L)  80.0 - 96.0 fL Final    MCH 12/14/2023 22.8 (L)  27.0 - 31.0 pg Final    MCHC 12/14/2023 30.5 (L)  32.0 - 36.0 g/dL Final    RDW 12/14/2023 13.6  11.5 - 14.5 % Final    Platelet Count 12/14/2023 360  150 - 400 K/uL Final    MPV 12/14/2023 10.0  9.4 - 12.4 fL Final    Neutrophils % 12/14/2023 55.4  53.0 - 65.0 % Final    Lymphocytes % 12/14/2023 37.3  27.0 - 41.0 % Final    Monocytes % 12/14/2023 6.2 (H)  2.0 - 6.0 % Final    Eosinophils % 12/14/2023 0.7 (L)  1.0 - 4.0 % Final    Basophils % 12/14/2023 0.2  0.0 - 1.0 % Final    Immature Granulocytes % 12/14/2023 0.2  0.0 - 0.4 % Final    nRBC, Auto 12/14/2023 0.0  <=0.0 % Final    Neutrophils, Abs 12/14/2023 2.51  1.80 - 7.70 K/uL Final    Lymphocytes, Absolute 12/14/2023 1.69  1.00 - 4.80 K/uL Final    Monocytes, Absolute 12/14/2023 0.28  0.00 - 0.80 K/uL Final    Eosinophils, Absolute 12/14/2023 0.03  0.00 - 0.50 K/uL Final    Basophils, Absolute 12/14/2023 0.01  0.00 - 0.20 K/uL Final    Immature Granulocytes, Absolute 12/14/2023 0.01  0.00 - 0.04 K/uL Final    nRBC, Absolute 12/14/2023 0.00  <=0.00 x10e3/uL Final    Diff Type 12/14/2023 Scan Smear   Final    Platelet Morphology 12/14/2023 Few Large Platelets (A)  Normal Final    Microcytosis 12/14/2023 1+   Final    Ovalocytes 12/14/2023 Few   Final    Hypochromic 12/14/2023 Few   Final   Office Visit on 11/07/2023   Component Date Value  Ref Range Status    POC Amphetamines 11/07/2023 Negative  Negative, Inconclusive Final    POC Barbiturates 11/07/2023 Negative  Negative, Inconclusive Final    POC Benzodiazepines 11/07/2023 Presumptive Positive (A)  Negative, Inconclusive Final    POC Cocaine 11/07/2023 Negative  Negative, Inconclusive Final    POC THC 11/07/2023 Negative  Negative, Inconclusive Final    POC Methadone 11/07/2023 Negative  Negative, Inconclusive Final    POC Methamphetamine 11/07/2023 Negative  Negative, Inconclusive Final    POC Opiates 11/07/2023 Negative  Negative, Inconclusive Final    POC Oxycodone 11/07/2023 Negative  Negative, Inconclusive Final    POC Phencyclidine 11/07/2023 Negative  Negative, Inconclusive Final    POC Methylenedioxymethamphetamine * 11/07/2023 Negative  Negative, Inconclusive Final    POC Tricyclic Antidepressants 11/07/2023 Negative  Negative, Inconclusive Final    POC Buprenorphine 11/07/2023 Negative   Final     Acceptable 11/07/2023 Yes   Final    POC Temperature (Urine) 11/07/2023 90   Final    pH, UA 11/07/2023 6.5  5.0 to 8.0 pH Units Final    Creatinine, Urine 11/07/2023 228 (H)  28 - 219 mg/dL Final    6-Acetylmorphine 11/07/2023 Negative  10 ng/mL Final    7-Aminoclonazepam 11/07/2023 Negative  Negative 25 ng/mL Final    a-Hydroxyalprazolam 11/07/2023 Negative  Negative 25 ng/mL Final    Acetyl Fentanyl 11/07/2023 Negative  Negative 2.5 ng/mL Final    Acetyl Norfentanyl Oxalate 11/07/2023 Negative  Negative 5 ng/mL Final    Benzoylecgonine 11/07/2023 Negative  Negative 100 ng/mL Final    Buprenorphine 11/07/2023 Negative  25 ng/mL Final    Codeine 11/07/2023 Negative  Negative 25 ng/mL Final    EDDP 11/07/2023 Negative  Negative 25 ng/mL Final    Fentanyl 11/07/2023 Negative  Negative 2.5 ng/mL Final    Hydrocodone 11/07/2023 Negative  Negative 25 ng/mL Final    Hydromorphone 11/07/2023 Negative  Negative 25 ng/mL Final    Lorazepam 11/07/2023 Negative  Negative 25 ng/mL Final     Morphine 11/07/2023 Negative  Negative 25 ng/mL Final    Norbuprenorphine 11/07/2023 Negative  Negative 25 ng/mL Final    Nordiazepam 11/07/2023 Negative  Negative 25 ng/mL Final    Norfentanyl Oxalate 11/07/2023 Negative  Negative 5 ng/mL Final    Norhydrocodone 11/07/2023 Negative  Negative 50 ng/mL Final    Noroxycodone HCL 11/07/2023 Negative  Negative 50 ng/mL Final    Oxazepam 11/07/2023 Negative  Negative 25 ng/mL Final    Oxymorphone 11/07/2023 Negative  Negative 25 ng/mL Final    Tapentadol 11/07/2023 Negative  Negative 25 ng/mL Final    Temazepam 11/07/2023 Negative  Negative 25 ng/mL Final    THC-COOH 11/07/2023 Negative  Negative 25 ng/mL Final    Tramadol 11/07/2023 Negative  Negative 100 ng/mL Final    Amphetamine, Urine 11/07/2023 Negative  Negative Final    Methamphetamines, Urine 11/07/2023 Negative  Negative Final    Methadone, Urine 11/07/2023 Negative  Negative 25 ng/mL Final    Oxycodone, Urine 11/07/2023 Negative  Negative 25 ng/mL Final    [facility], UA 11/07/2023 1.029  <=1.030 Final   Office Visit on 11/03/2023   Component Date Value Ref Range Status    POC Rapid COVID 11/03/2023 Negative  Negative Final     Acceptable 11/03/2023 Yes   Final    Rapid Influenza A Ag 11/03/2023 Negative  Negative Final    Rapid Influenza B Ag 11/03/2023 Negative  Negative Final     Acceptable 11/03/2023 Yes   Final    Color, UA 11/03/2023 Yellow   Final    Spec Grav UA 11/03/2023 1.030   Final    pH, UA 11/03/2023 6.5   Final    WBC, UA 11/03/2023 Negative   Final    Nitrite, UA 11/03/2023 Negative   Final    Protein, POC 11/03/2023 Negative   Final    Glucose, UA 11/03/2023 Negative   Final    Ketones, UA 11/03/2023 Negative   Final    Bilirubin, POC 11/03/2023 Negative   Final    Urobilinogen, UA 11/03/2023 0.2   Final    Blood, UA 11/03/2023 Negative   Final    POC Glucose 11/03/2023 131 (A)  70 - 110 MG/DL Final    Sodium 11/03/2023 141  136 - 145 mmol/L Final     Potassium 11/03/2023 3.9  3.5 - 5.1 mmol/L Final    Chloride 11/03/2023 107  98 - 107 mmol/L Final    CO2 11/03/2023 31  21 - 32 mmol/L Final    Anion Gap 11/03/2023 7  7 - 16 mmol/L Final    Glucose 11/03/2023 112 (H)  74 - 106 mg/dL Final    BUN 11/03/2023 7  7 - 18 mg/dL Final    Creatinine 11/03/2023 0.89  0.55 - 1.02 mg/dL Final    BUN/Creatinine Ratio 11/03/2023 8  6 - 20 Final    Calcium 11/03/2023 9.6  8.5 - 10.1 mg/dL Final    Total Protein 11/03/2023 8.0  6.4 - 8.2 g/dL Final    Albumin 11/03/2023 4.1  3.5 - 5.0 g/dL Final    Globulin 11/03/2023 3.9  2.0 - 4.0 g/dL Final    A/G Ratio 11/03/2023 1.1   Final    Bilirubin, Total 11/03/2023 0.1  >0.0 - 1.2 mg/dL Final    Alk Phos 11/03/2023 127 (H)  37 - 98 U/L Final    ALT 11/03/2023 20  13 - 56 U/L Final    AST 11/03/2023 8 (L)  15 - 37 U/L Final    eGFR 11/03/2023 85  >=60 mL/min/1.73m2 Final    Free T4 11/03/2023 0.87  0.76 - 1.46 ng/dL Final    TSH 11/03/2023 1.460  0.358 - 3.740 uIU/mL Final    Hemoglobin A1C 11/03/2023 5.8  4.5 - 6.6 % Final    Estimated Average Glucose 11/03/2023 107  mg/dL Final    Culture, Urine 11/03/2023 Skin/Urogenital Racheal Isolated, no further workup.   Final    WBC 11/03/2023 4.33 (L)  4.50 - 11.00 K/uL Final    RBC 11/03/2023 5.31  4.20 - 5.40 M/uL Final    Hemoglobin 11/03/2023 12.4  12.0 - 16.0 g/dL Final    Hematocrit 11/03/2023 41.3  38.0 - 47.0 % Final    MCV 11/03/2023 77.8 (L)  80.0 - 96.0 fL Final    MCH 11/03/2023 23.4 (L)  27.0 - 31.0 pg Final    MCHC 11/03/2023 30.0 (L)  32.0 - 36.0 g/dL Final    RDW 11/03/2023 14.1  11.5 - 14.5 % Final    Platelet Count 11/03/2023 342  150 - 400 K/uL Final    MPV 11/03/2023 9.9  9.4 - 12.4 fL Final    Neutrophils % 11/03/2023 45.8 (L)  53.0 - 65.0 % Final    Lymphocytes % 11/03/2023 46.4 (H)  27.0 - 41.0 % Final    Monocytes % 11/03/2023 6.5 (H)  2.0 - 6.0 % Final    Eosinophils % 11/03/2023 0.9 (L)  1.0 - 4.0 % Final    Basophils % 11/03/2023 0.2  0.0 - 1.0 % Final    Immature  Granulocytes % 11/03/2023 0.2  0.0 - 0.4 % Final    nRBC, Auto 11/03/2023 0.0  <=0.0 % Final    Neutrophils, Abs 11/03/2023 1.98  1.80 - 7.70 K/uL Final    Lymphocytes, Absolute 11/03/2023 2.01  1.00 - 4.80 K/uL Final    Monocytes, Absolute 11/03/2023 0.28  0.00 - 0.80 K/uL Final    Eosinophils, Absolute 11/03/2023 0.04  0.00 - 0.50 K/uL Final    Basophils, Absolute 11/03/2023 0.01  0.00 - 0.20 K/uL Final    Immature Granulocytes, Absolute 11/03/2023 0.01  0.00 - 0.04 K/uL Final    nRBC, Absolute 11/03/2023 0.00  <=0.00 x10e3/uL Final    Diff Type 11/03/2023 Auto   Final         No orders of the defined types were placed in this encounter.        Requested Prescriptions     Signed Prescriptions Disp Refills    acetaminophen-codeine 300-30mg (TYLENOL #3) 300-30 mg Tab 60 tablet 1     Sig: Take 1 tablet by mouth every 12 (twelve) hours as needed (pain).       Assessment:     1. Lumbar radiculopathy, chronic    2. Chronic pain of both knees    3. Polyarthralgia    4. Sacroiliitis         A's of Opioid Risk Assessment  Activity:Patient can perform ADL.   Analgesia:Patients pain is partially controlled by current medication. Patient has tried OTC medications such as Tylenol and Ibuprofen with out relief.   Adverse Effects: Patient denies constipation or sedation.  Aberrant Behavior:  reviewed with no aberrant drug seeking/taking behavior.  Overdose reversal drug naloxone discussed     Drug screen reviewed    MRI right shoulder [facility] November 21, 2023  Moderate acromioclavicular joint osteoarthrosis.  Partial tearing supraspinatus, infraspinatus and subscapularis tendons.  Mild-to-moderate tendinosis intra-articular biceps tendon.  Small amount of fluid in the subacromial subdeltoid bursa, small nonvisualized full-thickness tear could be present.       MRI lumbar spine [facility] November 30, 2022 L3/4 far lateral disc bulge right greater than left multiple level degenerative changes     X-ray bilateral  knees [facility] November 30, 2022 degenerative changes no fracture noted    X-ray lumbar spine [facility] November 30, 2022 degenerative changes no fracture noted    X-ray hips [facility] November 30, 2022 degenerative changes no fracture noted          Plan:    Narcan January 2023    Follows orthopedics [facility]      November 9, 2023 definitive drug screen returned negative no benzodiazepine no buprenorphine  Last refill date buprenorphine patch August 9, 2023 drug screen should have been negative      She had bilateral sacroiliac injection # 2, June 6, 2023  she states she had 50% relief after procedure, his procedure did help improve her level of function     She had lumbar L3/4 CARMEN # 2 January 23, 2024  She states she had 80% relief after procedure, procedure did help improve her level function    Today she would like to continue with current medication she states it does help with her discomfort    She is considering repeating procedure for chronic back pain sacroiliac discomfort    Continue current medication    Continue home exercise program as directed     Follow-up 2 months    Dr. Walters January 2024

## 2024-04-24 NOTE — H&P
History & Physical - Short Stay  Gastroenterology      SUBJECTIVE:     Procedure: Colonoscopy    Chief Complaint/Indication for Procedure: Family History of Colon Cancer    Current Outpatient Medications   Medication Sig Dispense Refill    acetaminophen-codeine 300-30mg (TYLENOL #3) 300-30 mg Tab Take 1 tablet by mouth every 12 (twelve) hours as needed (pain). 60 tablet 1    albuterol (PROVENTIL/VENTOLIN HFA) 90 mcg/actuation inhaler Inhale 2 puffs into the lungs every 6 (six) hours as needed.      celecoxib (CELEBREX) 200 MG capsule Take 1 capsule (200 mg total) by mouth 2 (two) times daily. 60 capsule 1    mirtazapine (REMERON) 30 MG tablet Take 1 tablet (30 mg total) by mouth every evening. 30 tablet 0    ondansetron (ZOFRAN-ODT) 4 MG TbDL Take 1 tablet (4 mg total) by mouth every 6 (six) hours as needed (nausea). 20 tablet 1    pantoprazole (PROTONIX) 40 MG tablet Take 1 tablet (40 mg total) by mouth once daily. 90 tablet 3    predniSONE (DELTASONE) 20 MG tablet Take 1 tablet (20 mg total) by mouth once daily. (Patient not taking: Reported on 2/27/2024) 5 tablet 0     Current Facility-Administered Medications   Medication Dose Route Frequency Provider Last Rate Last Admin    LIDOcaine-EPINEPHrine 1%-1:100,000 injection 1 mL  1 mL Intradermal 1 time in Clinic/HOD Erika Torres MD           Review of patient's allergies indicates:   Allergen Reactions    Opioids - morphine analogues     Pcn [penicillins]     Pineapple Rash        Past Medical History:   Diagnosis Date    Asthma     Cervical radiculopathy     Chronic pain syndrome     Depressive disorder     Fibromyalgia     GERD (gastroesophageal reflux disease)     Hyperlipidemia     Osteoarthritis     Palpitations     Radiculopathy of cervical region      Past Surgical History:   Procedure Laterality Date    CARPAL TUNNEL RELEASE Bilateral     EPIDURAL STEROID INJECTION N/A 12/29/2022    Procedure: Injection, Steroid, Epidural, L3/4;  Surgeon: Vanessa Walters,  MD;  Location: [facility];  Service: Pain Management;  Laterality: N/A;    EPIDURAL STEROID INJECTION N/A 9/5/2023    Procedure: Injection, Steroid, Epidural, L3/4;  Surgeon: Vanessa Walters MD;  Location: [facility];  Service: Pain Management;  Laterality: N/A;    EPIDURAL STEROID INJECTION N/A 1/23/2024    Procedure: Injection, Steroid, Epidural, L3/4;  Surgeon: Vanessa Waletrs MD;  Location: [facility];  Service: Pain Management;  Laterality: N/A;    HYSTERECTOMY      INJECTION OF ANESTHETIC AGENT INTO SACROILIAC JOINT Bilateral 5/9/2023    Procedure: BLOCK, SACROILIAC JOINT;  Surgeon: Vanessa Walters MD;  Location: [facility];  Service: Pain Management;  Laterality: Bilateral;    INJECTION OF ANESTHETIC AGENT INTO SACROILIAC JOINT Bilateral 6/6/2023    Procedure: BLOCK, SACROILIAC JOINT;  Surgeon: Vanessa Walters MD;  Location: [facility];  Service: Pain Management;  Laterality: Bilateral;     Family History   Problem Relation Name Age of Onset    Arthritis Mother      Cancer Mother      Depression Mother      Diabetes Mother      Heart disease Mother      Hypertension Mother      Stroke Mother      Asthma Daughter      Arthritis Maternal Grandmother      Cancer Maternal Grandmother      Depression Maternal Grandmother      Diabetes Maternal Grandmother      Hypertension Maternal Grandmother      Heart disease Maternal Grandfather      Hypertension Maternal Grandfather      Cancer Paternal Grandmother      Depression Paternal Grandmother      Diabetes Paternal Grandmother       Social History     Tobacco Use    Smoking status: Never    Smokeless tobacco: Never   Substance Use Topics    Alcohol use: Never    Drug use: Not Currently         OBJECTIVE:     Vital Signs (Most Recent)  Temp: 98 °F (36.7 °C) (04/01/24 1236)  Pulse: 60 (04/01/24 1311)  Resp: 15 (04/01/24 1311)  BP: 112/72 (04/01/24 1310)  SpO2: 100 % (04/01/24 1311)    Physical Exam:                                                        GENERAL:  Comfortable, in no acute distress.                                 HEENT EXAM:  Nonicteric.  No adenopathy.  Oropharynx is clear.               NECK:  Supple.                                                               LUNGS:  Clear.                                                               CARDIAC:  Regular rate and rhythm.  S1, S2.  No murmur.                      ABDOMEN:  Soft, positive bowel sounds, nontender.  No hepatosplenomegaly or masses.  No rebound or guarding.                                             EXTREMITIES:  No edema.     MENTAL STATUS:  Normal, alert and oriented.      ASSESSMENT/PLAN:     Assessment: Family History of Colon Cancer    Plan: Colonoscopy

## 2024-05-13 PROBLEM — J01.90 ACUTE NON-RECURRENT SINUSITIS: Status: RESOLVED | Noted: 2024-02-12 | Resolved: 2024-05-13

## 2024-05-29 NOTE — PROGRESS NOTES
Subjective:         Patient ID: Keshia Shepherd is a 39 y.o. female.    Chief Complaint: Shoulder Pain and Hand Pain        Pain  This is a chronic problem. The current episode started more than 1 year ago. The problem occurs daily. The problem has been waxing and waning. Associated symptoms include arthralgias, myalgias and neck pain. Pertinent negatives include no anorexia, chest pain, chills, coughing, diaphoresis, fever, sore throat, vertigo or vomiting.     Review of Systems   Constitutional:  Negative for activity change, appetite change, chills, diaphoresis, fever and unexpected weight change.   HENT:  Negative for drooling, ear discharge, ear pain, facial swelling, nosebleeds, sore throat, trouble swallowing, voice change and goiter.    Eyes:  Negative for photophobia, pain, discharge, redness and visual disturbance.   Respiratory:  Negative for apnea, cough, choking, chest tightness, shortness of breath, wheezing and stridor.    Cardiovascular:  Negative for chest pain, palpitations and leg swelling.   Gastrointestinal:  Negative for abdominal distention, anorexia, diarrhea, rectal pain, vomiting and fecal incontinence.   Endocrine: Negative for cold intolerance, heat intolerance, polydipsia, polyphagia and polyuria.   Genitourinary:  Negative for bladder incontinence, dysuria, flank pain, frequency, hematuria and hot flashes.   Musculoskeletal:  Positive for arthralgias, back pain, leg pain, myalgias, neck pain and neck stiffness. Negative for gait problem and joint deformity.   Integumentary:  Negative for color change and pallor.   Allergic/Immunologic: Negative for immunocompromised state.   Neurological:  Negative for dizziness, vertigo, seizures, syncope, facial asymmetry, speech difficulty, light-headedness, memory loss and coordination difficulties.   Hematological:  Negative for adenopathy. Does not bruise/bleed easily.   Psychiatric/Behavioral:  Negative for agitation, behavioral problems,  confusion, decreased concentration, dysphoric mood, hallucinations, self-injury and suicidal ideas. The patient is not nervous/anxious and is not hyperactive.            Past Medical History:   Diagnosis Date    Asthma     Cervical radiculopathy     Chronic pain syndrome     Depressive disorder     Fibromyalgia     GERD (gastroesophageal reflux disease)     Hyperlipidemia     Osteoarthritis     Palpitations     Radiculopathy of cervical region      Past Surgical History:   Procedure Laterality Date    CARPAL TUNNEL RELEASE Bilateral     EPIDURAL STEROID INJECTION N/A 12/29/2022    Procedure: Injection, Steroid, Epidural, L3/4;  Surgeon: Vanessa Walters MD;  Location: [facility];  Service: Pain Management;  Laterality: N/A;    EPIDURAL STEROID INJECTION N/A 9/5/2023    Procedure: Injection, Steroid, Epidural, L3/4;  Surgeon: Vanessa Walters MD;  Location: [facility];  Service: Pain Management;  Laterality: N/A;    EPIDURAL STEROID INJECTION N/A 1/23/2024    Procedure: Injection, Steroid, Epidural, L3/4;  Surgeon: Vanessa Walters MD;  Location: [facility];  Service: Pain Management;  Laterality: N/A;    HYSTERECTOMY      INJECTION OF ANESTHETIC AGENT INTO SACROILIAC JOINT Bilateral 5/9/2023    Procedure: BLOCK, SACROILIAC JOINT;  Surgeon: Vanessa Walters MD;  Location: [facility];  Service: Pain Management;  Laterality: Bilateral;    INJECTION OF ANESTHETIC AGENT INTO SACROILIAC JOINT Bilateral 6/6/2023    Procedure: BLOCK, SACROILIAC JOINT;  Surgeon: Vanessa Walters MD;  Location: [facility];  Service: Pain Management;  Laterality: Bilateral;     Social History     Socioeconomic History    Marital status: Single   Tobacco Use    Smoking status: Never    Smokeless tobacco: Never   Substance and Sexual Activity    Alcohol use: Never    Drug use: Not Currently    Sexual activity: Yes     Partners: Male     Family History   Problem Relation Name Age of Onset    Arthritis  "Mother      Cancer Mother      Depression Mother      Diabetes Mother      Heart disease Mother      Hypertension Mother      Stroke Mother      Asthma Daughter      Arthritis Maternal Grandmother      Cancer Maternal Grandmother      Depression Maternal Grandmother      Diabetes Maternal Grandmother      Hypertension Maternal Grandmother      Heart disease Maternal Grandfather      Hypertension Maternal Grandfather      Cancer Paternal Grandmother      Depression Paternal Grandmother      Diabetes Paternal Grandmother       Review of patient's allergies indicates:   Allergen Reactions    Opioids - morphine analogues     Pcn [penicillins]     Pineapple Rash        Objective:  Vitals:    06/05/24 1441   BP: 120/78   Pulse: 73   Resp: 18   Weight: 71.2 kg (157 lb)   Height: 5' 6" (1.676 m)   PainSc:   8             Physical Exam  Vitals and nursing note reviewed. Exam conducted with a chaperone present.   Constitutional:       General: She is awake. She is not in acute distress.     Appearance: Normal appearance. She is not ill-appearing, toxic-appearing or diaphoretic.   HENT:      Head: Normocephalic and atraumatic.      Nose: Nose normal.      Mouth/Throat:      Mouth: Mucous membranes are moist.      Pharynx: Oropharynx is clear.   Eyes:      Conjunctiva/sclera: Conjunctivae normal.      Pupils: Pupils are equal, round, and reactive to light.   Cardiovascular:      Rate and Rhythm: Normal rate.   Pulmonary:      Effort: Pulmonary effort is normal. No respiratory distress.   Abdominal:      Palpations: Abdomen is soft.   Musculoskeletal:         General: No deformity or signs of injury. Normal range of motion.      Cervical back: Normal range of motion and neck supple.   Skin:     General: Skin is warm and dry.   Neurological:      General: No focal deficit present.      Mental Status: She is alert and oriented to person, place, and time. Mental status is at baseline.      Cranial Nerves: No cranial nerve deficit " (II-XII).   Psychiatric:         Mood and Affect: Mood normal.         Behavior: Behavior normal. Behavior is cooperative.         Thought Content: Thought content normal.           Colonoscopy  Narrative: Table formatting from the original result was not included.  Procedure Date  4/1/24    Impression  Overall   Impression:    The cecum, ascending colon, transverse colon, descending colon, sigmoid   colon and rectum appeared normal.    Recommendation  - Repeat colonoscopy in 5 years due to family history of colon cancer  - Discharge patient to home  - Advance diet as tolerated  - Continue present medications  - Patient has a contact number available for emergencies. The signs and   symptoms of potential delayed complications were discussed with the   patient. Return to normal activities tomorrow. Written discharge   instructions were provided to the patient.    Indication  Screening for colon cancer, Family history of colorectal cancer    Providers  Trino Castillo, Vickie Casanova CRNA Technician   Blanca Karimi Technician   Memo Nolasco, Tisha Doshi CRNA, RN Registered Nurse   Haroldo Victor MD Proceduralist     Medications  General anesthesia - See anesthesia record.    Preprocedure  A history and physical has been performed, and patient medication   allergies have been reviewed. The patient's tolerance of previous   anesthesia has been reviewed. The risks and benefits of the procedure and   the sedation options and risks were discussed with the patient. All   questions were answered and informed consent obtained.    ASA Score: ASA 2 - Patient with mild systemic disease with no functional   limitations  Mallampati Airway Score: II (hard and soft palate, upper portion of   tonsils anduvula visible)    Details of the Procedure  The patient underwent general anesthesia, which was administered by an   anesthesia professional. The patient's heart rate, blood pressure, level   of  consciousness, respirations, oxygen, ECG and ETCO2 were monitored   throughout the procedure. A digital rectal exam was performed. A perianal   exam was performed. The scope was introduced through the anus and advanced   to the cecum. Retroflexion was performed in the rectum. The quality of   bowel preparation was evaluated using the Pella Bowel Preparation Scale   with scores of: right colon = 3, transverse colon = 3, left colon = 3. The   total BBPS score was 9. Bowel prep was adequate. The patient's estimated   blood loss was minimal (<5 mL). The procedure was not difficult. The   patient tolerated the procedure well. There were no apparent adverse   events.     Scope: Pediatric Colonoscope  Scope Serial: [id number]    Events    Procedure Events   Event Event Time     Procedure Events   Event Event Time   ENDO SCOPE IN TIME 4/1/2024 12:20 PM   ENDO CECUM REACHED 4/1/2024 12:25 PM   ENDO SCOPE OUT TIME 4/1/2024 12:35 PM     CECAL WITHDRAWAL TIME: 9m 44s    Findings  The cecum, ascending colon, transverse colon, descending colon, sigmoid   colon and rectum appeared normal.         Hospital Outpatient Visit on 03/12/2024   Component Date Value Ref Range Status    Case Report 03/12/2024    Final                    Value:Surgical Pathology                                Case: [id number]                                   Authorizing Provider:  Haroldo Victor MD     Collected:           03/12/2024 12:33 PM          Ordering Location:     [facility] -         Received:            03/12/2024 01:16 PM                                 Endoscopy                                                                    Pathologist:           Maicol Rausch III, MD                                                                           Specimens:   A) - Stomach, a. gastric bx, r/o H.Pylori                                                            B) - Esophagus, b. mid esophageal bx, r/o EOE                                              Final Diagnosis 03/12/2024    Final                    Value:This result contains rich text formatting which cannot be displayed here.    Gross Description 03/12/2024    Final                    Value:This result contains rich text formatting which cannot be displayed here.    Microscopic Description 03/12/2024    Final                    Value:This result contains rich text formatting which cannot be displayed here.    Laboratory Notes 03/12/2024    Final                    Value:This result contains rich text formatting which cannot be displayed here.   Office Visit on 02/12/2024   Component Date Value Ref Range Status    Rapid Influenza A Ag 02/12/2024 Negative  Negative Final    Rapid Influenza B Ag 02/12/2024 Negative  Negative Final     Acceptable 02/12/2024 Yes   Final    Rapid Strep A Screen 02/12/2024 Negative  Negative Final     Acceptable 02/12/2024 Yes   Final   Office Visit on 02/06/2024   Component Date Value Ref Range Status    POC Amphetamines 02/06/2024 Negative  Negative, Inconclusive Final    POC Barbiturates 02/06/2024 Negative  Negative, Inconclusive Final    POC Benzodiazepines 02/06/2024 Negative  Negative, Inconclusive Final    POC Cocaine 02/06/2024 Negative  Negative, Inconclusive Final    POC THC 02/06/2024 Negative  Negative, Inconclusive Final    POC Methadone 02/06/2024 Negative  Negative, Inconclusive Final    POC Methamphetamine 02/06/2024 Negative  Negative, Inconclusive Final    POC Opiates 02/06/2024 Presumptive Positive (A)  Negative, Inconclusive Final    POC Oxycodone 02/06/2024 Negative  Negative, Inconclusive Final    POC Phencyclidine 02/06/2024 Negative  Negative, Inconclusive Final    POC Methylenedioxymethamphetamine * 02/06/2024 Negative  Negative, Inconclusive Final    POC Tricyclic Antidepressants 02/06/2024 Negative  Negative, Inconclusive Final     POC Buprenorphine 02/06/2024 Negative   Final     Acceptable 02/06/2024 Yes   Final    POC Temperature (Urine) 02/06/2024 92   Final   Office Visit on 12/14/2023   Component Date Value Ref Range Status    Iron 12/14/2023 74  50 - 170 µg/dL Final    Iron Saturation 12/14/2023 27  14 - 50 % Final    TIBC 12/14/2023 279  250 - 450 µg/dL Final    WBC 12/14/2023 4.53  4.50 - 11.00 K/uL Final    RBC 12/14/2023 5.27  4.20 - 5.40 M/uL Final    Hemoglobin 12/14/2023 12.0  12.0 - 16.0 g/dL Final    Hematocrit 12/14/2023 39.4  38.0 - 47.0 % Final    MCV 12/14/2023 74.8 (L)  80.0 - 96.0 fL Final    MCH 12/14/2023 22.8 (L)  27.0 - 31.0 pg Final    MCHC 12/14/2023 30.5 (L)  32.0 - 36.0 g/dL Final    RDW 12/14/2023 13.6  11.5 - 14.5 % Final    Platelet Count 12/14/2023 360  150 - 400 K/uL Final    MPV 12/14/2023 10.0  9.4 - 12.4 fL Final    Neutrophils % 12/14/2023 55.4  53.0 - 65.0 % Final    Lymphocytes % 12/14/2023 37.3  27.0 - 41.0 % Final    Monocytes % 12/14/2023 6.2 (H)  2.0 - 6.0 % Final    Eosinophils % 12/14/2023 0.7 (L)  1.0 - 4.0 % Final    Basophils % 12/14/2023 0.2  0.0 - 1.0 % Final    Immature Granulocytes % 12/14/2023 0.2  0.0 - 0.4 % Final    nRBC, Auto 12/14/2023 0.0  <=0.0 % Final    Neutrophils, Abs 12/14/2023 2.51  1.80 - 7.70 K/uL Final    Lymphocytes, Absolute 12/14/2023 1.69  1.00 - 4.80 K/uL Final    Monocytes, Absolute 12/14/2023 0.28  0.00 - 0.80 K/uL Final    Eosinophils, Absolute 12/14/2023 0.03  0.00 - 0.50 K/uL Final    Basophils, Absolute 12/14/2023 0.01  0.00 - 0.20 K/uL Final    Immature Granulocytes, Absolute 12/14/2023 0.01  0.00 - 0.04 K/uL Final    nRBC, Absolute 12/14/2023 0.00  <=0.00 x10e3/uL Final    Diff Type 12/14/2023 Scan Smear   Final    Platelet Morphology 12/14/2023 Few Large Platelets (A)  Normal Final    Microcytosis 12/14/2023 1+   Final    Ovalocytes 12/14/2023 Few   Final    Hypochromic 12/14/2023 Few   Final         Orders Placed This Encounter   Procedures     X-Ray Shoulder 2 or More Views Left     Standing Status:   Future     Number of Occurrences:   1     Standing Expiration Date:   9/5/2024     Order Specific Question:   Does the patient have a splint or a brace?     Answer:   No     Order Specific Question:   Does the patient have a cast?     Answer:   No     Order Specific Question:   Reason for Exam:     Answer:   shoulder pain     Order Specific Question:   Is the patient pregnant?     Answer:   No     Order Specific Question:   May the Radiologist modify the order per protocol to meet the clinical needs of the patient?     Answer:   Yes     Order Specific Question:   Release to patient     Answer:   Immediate    Ambulatory referral/consult to Orthopedics     Standing Status:   Future     Standing Expiration Date:   7/5/2025     Referral Priority:   Routine     Referral Type:   Consultation     Referred to Provider:   Jeison De Leon MD     Requested Specialty:   Orthopedic Surgery     Number of Visits Requested:   1    POCT Urine Drug Screen Presump     Interpretive Information:     Negative:  No drug detected at the cut off level.   Positive:  This result represents presumptive positive for the   tested drug, other substances may yield a positive response other   than the analyte of interest. This result should be utilized for   diagnostic purpose only. Confirmation testing will be performed upon physician request only.            Requested Prescriptions     Signed Prescriptions Disp Refills    HYDROcodone-acetaminophen (NORCO) 5-325 mg per tablet 60 tablet 0     Sig: Take 1 tablet by mouth every 12 (twelve) hours as needed for Pain.       Assessment:     1. Lumbar radiculopathy, chronic    2. Chronic pain of both knees    3. Polyarthralgia    4. Sacroiliitis    5. Encounter for long-term (current) use of other medications    6. Carpal tunnel syndrome, bilateral    7. Chronic left shoulder pain           A's of Opioid Risk Assessment  Activity:Patient can perform  ADL.   Analgesia:Patients pain is partially controlled by current medication. Patient has tried OTC medications such as Tylenol and Ibuprofen with out relief.   Adverse Effects: Patient denies constipation or sedation.  Aberrant Behavior:  reviewed with no aberrant drug seeking/taking behavior.  Overdose reversal drug naloxone discussed     Drug screen reviewed    MRI right shoulder [facility] November 21, 2023  Moderate acromioclavicular joint osteoarthrosis.  Partial tearing supraspinatus, infraspinatus and subscapularis tendons.  Mild-to-moderate tendinosis intra-articular biceps tendon.  Small amount of fluid in the subacromial subdeltoid bursa, small nonvisualized full-thickness tear could be present.       MRI lumbar spine [facility] November 30, 2022 L3/4 far lateral disc bulge right greater than left multiple level degenerative changes     X-ray bilateral knees [facility] November 30, 2022 degenerative changes no fracture noted    X-ray lumbar spine [facility] November 30, 2022 degenerative changes no fracture noted    X-ray hips [facility] November 30, 2022 degenerative changes no fracture noted          Plan:    Narcan January 2023    Follows orthopedics [facility]    History bilateral carpal tunnel release 2005??    Complaining increasing numbness tingling left thumb left fingers 2nd 3rd finger left hand she is concerned her carpal tunnel may be returning requesting referral orthopedics [facility]    She had bilateral sacroiliac injection # 2, June 6, 2023  she states she had 50% relief after procedure, his procedure did help improve her level of function     She had lumbar L3/4 CARMEN # 2 January 23, 2024  She states she had 80% relief after procedure, procedure did help improve her level function      Requesting referral orthopedics she is requesting Dr. Jeison De Leon bilateral carpal tunnel syndrome chronic left shoulder pain    X-ray left shoulder  .  Requesting different medication  Tylenol 3 is no longer helping    Hydrocodone 5 1 p.o. q.12 hours short-term     Resume Tylenol 3 at next visit    Continue home exercise program as directed     Follow-up 2 months    Dr. Walters January 2024

## 2024-06-05 ENCOUNTER — HOSPITAL ENCOUNTER (OUTPATIENT)
Dept: RADIOLOGY | Facility: HOSPITAL | Age: 39
Discharge: HOME | End: 2024-06-05
Attending: PHYSICIAN ASSISTANT
Payer: COMMERCIAL

## 2024-06-05 ENCOUNTER — OFFICE VISIT (OUTPATIENT)
Dept: PAIN MEDICINE | Facility: CLINIC | Age: 39
End: 2024-06-05
Payer: COMMERCIAL

## 2024-06-05 VITALS
DIASTOLIC BLOOD PRESSURE: 78 MMHG | BODY MASS INDEX: 25.23 KG/M2 | HEIGHT: 66 IN | SYSTOLIC BLOOD PRESSURE: 120 MMHG | HEART RATE: 73 BPM | WEIGHT: 157 LBS | RESPIRATION RATE: 18 BRPM

## 2024-06-05 DIAGNOSIS — G89.29 CHRONIC LEFT SHOULDER PAIN: Chronic | ICD-10-CM

## 2024-06-05 DIAGNOSIS — M25.561 CHRONIC PAIN OF BOTH KNEES: Chronic | ICD-10-CM

## 2024-06-05 DIAGNOSIS — G89.29 CHRONIC PAIN OF BOTH KNEES: Chronic | ICD-10-CM

## 2024-06-05 DIAGNOSIS — M25.50 POLYARTHRALGIA: Chronic | ICD-10-CM

## 2024-06-05 DIAGNOSIS — M54.16 LUMBAR RADICULOPATHY, CHRONIC: Primary | Chronic | ICD-10-CM

## 2024-06-05 DIAGNOSIS — M25.512 CHRONIC LEFT SHOULDER PAIN: Chronic | ICD-10-CM

## 2024-06-05 DIAGNOSIS — G56.03 CARPAL TUNNEL SYNDROME, BILATERAL: Chronic | ICD-10-CM

## 2024-06-05 DIAGNOSIS — M46.1 SACROILIITIS: Chronic | ICD-10-CM

## 2024-06-05 DIAGNOSIS — M25.562 CHRONIC PAIN OF BOTH KNEES: Chronic | ICD-10-CM

## 2024-06-05 DIAGNOSIS — Z79.899 ENCOUNTER FOR LONG-TERM (CURRENT) USE OF OTHER MEDICATIONS: ICD-10-CM

## 2024-06-05 LAB

## 2024-06-05 PROCEDURE — 3074F SYST BP LT 130 MM HG: CPT | Mod: CPTII,,, | Performed by: PHYSICIAN ASSISTANT

## 2024-06-05 PROCEDURE — 99214 OFFICE O/P EST MOD 30 MIN: CPT | Mod: S$PBB,,, | Performed by: PHYSICIAN ASSISTANT

## 2024-06-05 PROCEDURE — 3078F DIAST BP <80 MM HG: CPT | Mod: CPTII,,, | Performed by: PHYSICIAN ASSISTANT

## 2024-06-05 PROCEDURE — 73030 X-RAY EXAM OF SHOULDER: CPT | Mod: 26,LT,, | Performed by: RADIOLOGY

## 2024-06-05 PROCEDURE — 99215 OFFICE O/P EST HI 40 MIN: CPT | Mod: PBBFAC,25 | Performed by: PHYSICIAN ASSISTANT

## 2024-06-05 PROCEDURE — 73030 X-RAY EXAM OF SHOULDER: CPT | Mod: TC,LT

## 2024-06-05 PROCEDURE — 80305 DRUG TEST PRSMV DIR OPT OBS: CPT | Mod: PBBFAC | Performed by: PHYSICIAN ASSISTANT

## 2024-06-05 PROCEDURE — 1159F MED LIST DOCD IN RCRD: CPT | Mod: CPTII,,, | Performed by: PHYSICIAN ASSISTANT

## 2024-06-05 PROCEDURE — 3008F BODY MASS INDEX DOCD: CPT | Mod: CPTII,,, | Performed by: PHYSICIAN ASSISTANT

## 2024-06-05 PROCEDURE — 99999PBSHW POCT URINE DRUG SCREEN PRESUMP: Mod: PBBFAC,,,

## 2024-06-05 RX ORDER — ACETAMINOPHEN AND CODEINE PHOSPHATE 300; 30 MG/1; MG/1
1 TABLET ORAL EVERY 12 HOURS PRN
Qty: 60 TABLET | Refills: 1 | Status: CANCELLED | OUTPATIENT
Start: 2024-06-05

## 2024-06-05 RX ORDER — HYDROCODONE BITARTRATE AND ACETAMINOPHEN 5; 325 MG/1; MG/1
1 TABLET ORAL EVERY 12 HOURS PRN
Qty: 60 TABLET | Refills: 0 | Status: SHIPPED | OUTPATIENT
Start: 2024-06-05 | End: 2024-07-05

## 2024-06-06 ENCOUNTER — OFFICE VISIT (OUTPATIENT)
Dept: FAMILY MEDICINE | Facility: CLINIC | Age: 39
End: 2024-06-06
Payer: COMMERCIAL

## 2024-06-06 VITALS
SYSTOLIC BLOOD PRESSURE: 122 MMHG | HEIGHT: 66 IN | OXYGEN SATURATION: 96 % | HEART RATE: 67 BPM | WEIGHT: 156 LBS | DIASTOLIC BLOOD PRESSURE: 76 MMHG | TEMPERATURE: 98 F | BODY MASS INDEX: 25.07 KG/M2

## 2024-06-06 DIAGNOSIS — Z20.828 EXPOSURE TO VIRAL DISEASE: ICD-10-CM

## 2024-06-06 DIAGNOSIS — R06.02 SOB (SHORTNESS OF BREATH): ICD-10-CM

## 2024-06-06 DIAGNOSIS — K59.00 CONSTIPATION, UNSPECIFIED CONSTIPATION TYPE: ICD-10-CM

## 2024-06-06 DIAGNOSIS — R11.2 NAUSEA AND VOMITING, UNSPECIFIED VOMITING TYPE: Primary | ICD-10-CM

## 2024-06-06 DIAGNOSIS — E04.9 GOITER: ICD-10-CM

## 2024-06-06 DIAGNOSIS — R11.0 NAUSEA: ICD-10-CM

## 2024-06-06 DIAGNOSIS — M54.2 NECK PAIN: ICD-10-CM

## 2024-06-06 LAB
ALBUMIN SERPL BCP-MCNC: 4 G/DL (ref 3.5–5)
ALBUMIN/GLOB SERPL: 1 {RATIO}
ALP SERPL-CCNC: 109 U/L (ref 37–98)
ALT SERPL W P-5'-P-CCNC: 17 U/L (ref 13–56)
ANION GAP SERPL CALCULATED.3IONS-SCNC: 7 MMOL/L (ref 7–16)
AST SERPL W P-5'-P-CCNC: 12 U/L (ref 15–37)
BASOPHILS # BLD AUTO: 0.02 K/UL (ref 0–0.2)
BASOPHILS NFR BLD AUTO: 0.4 % (ref 0–1)
BILIRUB SERPL-MCNC: 0.2 MG/DL (ref ?–1.2)
BILIRUB SERPL-MCNC: NEGATIVE MG/DL
BLOOD URINE, POC: NEGATIVE
BUN SERPL-MCNC: 6 MG/DL (ref 7–18)
BUN/CREAT SERPL: 7 (ref 6–20)
CALCIUM SERPL-MCNC: 9.9 MG/DL (ref 8.5–10.1)
CHLORIDE SERPL-SCNC: 108 MMOL/L (ref 98–107)
CO2 SERPL-SCNC: 30 MMOL/L (ref 21–32)
COLOR, POC UA: YELLOW
CREAT SERPL-MCNC: 0.81 MG/DL (ref 0.55–1.02)
CTP QC/QA: YES
DIFFERENTIAL METHOD BLD: ABNORMAL
EGFR (NO RACE VARIABLE) (RUSH/TITUS): 95 ML/MIN/1.73M2
EOSINOPHIL # BLD AUTO: 0.05 K/UL (ref 0–0.5)
EOSINOPHIL NFR BLD AUTO: 1.1 % (ref 1–4)
ERYTHROCYTE [DISTWIDTH] IN BLOOD BY AUTOMATED COUNT: 13.2 % (ref 11.5–14.5)
GLOBULIN SER-MCNC: 3.9 G/DL (ref 2–4)
GLUCOSE SERPL-MCNC: 99 MG/DL (ref 74–106)
GLUCOSE UR QL STRIP: NEGATIVE
HCT VFR BLD AUTO: 36.9 % (ref 38–47)
HGB BLD-MCNC: 10.8 G/DL (ref 12–16)
IMM GRANULOCYTES # BLD AUTO: 0 K/UL (ref 0–0.04)
IMM GRANULOCYTES NFR BLD: 0 % (ref 0–0.4)
KETONES UR QL STRIP: NEGATIVE
LEUKOCYTE ESTERASE URINE, POC: ABNORMAL
LYMPHOCYTES # BLD AUTO: 1.75 K/UL (ref 1–4.8)
LYMPHOCYTES NFR BLD AUTO: 37.2 % (ref 27–41)
MCH RBC QN AUTO: 22.5 PG (ref 27–31)
MCHC RBC AUTO-ENTMCNC: 29.3 G/DL (ref 32–36)
MCV RBC AUTO: 76.9 FL (ref 80–96)
MONOCYTES # BLD AUTO: 0.3 K/UL (ref 0–0.8)
MONOCYTES NFR BLD AUTO: 6.4 % (ref 2–6)
MPC BLD CALC-MCNC: 9.7 FL (ref 9.4–12.4)
NEUTROPHILS # BLD AUTO: 2.58 K/UL (ref 1.8–7.7)
NEUTROPHILS NFR BLD AUTO: 54.9 % (ref 53–65)
NITRITE, POC UA: NEGATIVE
NRBC # BLD AUTO: 0 X10E3/UL
NRBC, AUTO (.00): 0 %
PH, POC UA: 7
PLATELET # BLD AUTO: 350 K/UL (ref 150–400)
POC MOLECULAR INFLUENZA A AGN: NEGATIVE
POC MOLECULAR INFLUENZA B AGN: NEGATIVE
POTASSIUM SERPL-SCNC: 4.5 MMOL/L (ref 3.5–5.1)
PROT SERPL-MCNC: 7.9 G/DL (ref 6.4–8.2)
PROTEIN, POC: NEGATIVE
RBC # BLD AUTO: 4.8 M/UL (ref 4.2–5.4)
SODIUM SERPL-SCNC: 140 MMOL/L (ref 136–145)
SPECIFIC GRAVITY, POC UA: 1.02
TSH SERPL DL<=0.005 MIU/L-ACNC: 1.37 UIU/ML (ref 0.36–3.74)
UROBILINOGEN, POC UA: 0.2
WBC # BLD AUTO: 4.7 K/UL (ref 4.5–11)

## 2024-06-06 PROCEDURE — 99214 OFFICE O/P EST MOD 30 MIN: CPT | Mod: 25,,, | Performed by: NURSE PRACTITIONER

## 2024-06-06 PROCEDURE — 1159F MED LIST DOCD IN RCRD: CPT | Mod: CPTII,,, | Performed by: NURSE PRACTITIONER

## 2024-06-06 PROCEDURE — 96372 THER/PROPH/DIAG INJ SC/IM: CPT | Mod: ,,, | Performed by: NURSE PRACTITIONER

## 2024-06-06 PROCEDURE — 1160F RVW MEDS BY RX/DR IN RCRD: CPT | Mod: CPTII,,, | Performed by: NURSE PRACTITIONER

## 2024-06-06 PROCEDURE — 81003 URINALYSIS AUTO W/O SCOPE: CPT | Mod: QW,,, | Performed by: NURSE PRACTITIONER

## 2024-06-06 PROCEDURE — 3008F BODY MASS INDEX DOCD: CPT | Mod: CPTII,,, | Performed by: NURSE PRACTITIONER

## 2024-06-06 PROCEDURE — 3078F DIAST BP <80 MM HG: CPT | Mod: CPTII,,, | Performed by: NURSE PRACTITIONER

## 2024-06-06 PROCEDURE — 80050 GENERAL HEALTH PANEL: CPT | Mod: ,,, | Performed by: CLINICAL MEDICAL LABORATORY

## 2024-06-06 PROCEDURE — 3074F SYST BP LT 130 MM HG: CPT | Mod: CPTII,,, | Performed by: NURSE PRACTITIONER

## 2024-06-06 PROCEDURE — 87086 URINE CULTURE/COLONY COUNT: CPT | Mod: ,,, | Performed by: CLINICAL MEDICAL LABORATORY

## 2024-06-06 PROCEDURE — 87502 INFLUENZA DNA AMP PROBE: CPT | Mod: QW,,, | Performed by: NURSE PRACTITIONER

## 2024-06-06 RX ORDER — ONDANSETRON HYDROCHLORIDE 2 MG/ML
4 INJECTION, SOLUTION INTRAVENOUS
Status: DISCONTINUED | OUTPATIENT
Start: 2024-06-06 | End: 2024-06-06

## 2024-06-06 RX ORDER — ONDANSETRON 4 MG/1
4 TABLET, ORALLY DISINTEGRATING ORAL EVERY 8 HOURS PRN
Qty: 15 TABLET | Refills: 0 | Status: SHIPPED | OUTPATIENT
Start: 2024-06-06

## 2024-06-06 RX ORDER — POLYETHYLENE GLYCOL 3350 17 G/17G
17 POWDER, FOR SOLUTION ORAL DAILY
Qty: 595 G | Refills: 5 | Status: SHIPPED | OUTPATIENT
Start: 2024-06-06

## 2024-06-06 RX ORDER — DEXAMETHASONE SODIUM PHOSPHATE 4 MG/ML
4 INJECTION, SOLUTION INTRA-ARTICULAR; INTRALESIONAL; INTRAMUSCULAR; INTRAVENOUS; SOFT TISSUE
Status: COMPLETED | OUTPATIENT
Start: 2024-06-06 | End: 2024-06-06

## 2024-06-06 RX ORDER — ONDANSETRON 2 MG/ML
4 INJECTION INTRAMUSCULAR; INTRAVENOUS
Status: COMPLETED | OUTPATIENT
Start: 2024-06-06 | End: 2024-06-06

## 2024-06-06 RX ORDER — POLYETHYLENE GLYCOL 3350, SODIUM SULFATE ANHYDROUS, SODIUM BICARBONATE, SODIUM CHLORIDE, POTASSIUM CHLORIDE 236; 22.74; 6.74; 5.86; 2.97 G/4L; G/4L; G/4L; G/4L; G/4L
POWDER, FOR SOLUTION ORAL
COMMUNITY
Start: 2024-03-26

## 2024-06-06 RX ADMIN — DEXAMETHASONE SODIUM PHOSPHATE 4 MG: 4 INJECTION, SOLUTION INTRA-ARTICULAR; INTRALESIONAL; INTRAMUSCULAR; INTRAVENOUS; SOFT TISSUE at 06:06

## 2024-06-06 RX ADMIN — ONDANSETRON 4 MG: 2 INJECTION INTRAMUSCULAR; INTRAVENOUS at 06:06

## 2024-06-06 NOTE — PATIENT INSTRUCTIONS
Given a dose of Zofran (ondansetron) in the clinic. One hour after taking Zofran, start sipping Gatorade slowly (1-2 tsp) every 15 min for an hour. If holds this down, you can continue to go up on intake slowly. Advance diet to bland foods when tolerating liquids (dry toast, crackers, soup, grits, mashed potatoes without gravy.   Follow-up with us if no one has called you regarding an appointment for ultrasound of thyroid within 1 week. [phone]   Go to ER if vomiting does not stop or new symptoms.

## 2024-06-06 NOTE — LETTER
June 6, 2024      [facility]  [address] [place] [address]  Phone: [phone]  Fax: [phone]       Patient: Keshia Shepherd   YOB: 1985  Date of Visit: 06/06/2024    To Whom It May Concern:    Taylor Shepherd  was at [facility] on 06/06/2024. The patient may return to work/school on 06/08/2024 with no restrictions. If you have any questions or concerns, or if I can be of further assistance, please do not hesitate to contact me.    Sincerely,    Fauzia MINOR

## 2024-06-06 NOTE — PROGRESS NOTES
"Subjective:       Patient ID: Keshia Shepherd is a 39 y.o. female.    Chief Complaint: Emesis, Headache (Pt states symptoms started a week ago and not getting any better), Chills, Nausea, and Neck Pain (Pt states throbbing pain for about a week)    Presents to clinic as above. States HA frontal for a week. Has chronic sinus congestion. Not worst HA of life. No change in vision, slurred speech, facial drooping or weakness. States N/V started yesterday evening at around 7:30. Vomited three times yesterday and three times so far today. Denies diarrhea. Also c/o soreness to anterior neck. Has had thyroid evaluated in the past and everything normal per patient. Hx of hysterectomy.         Review of Systems   Constitutional:  Positive for malaise/fatigue. Negative for chills and fever.   HENT:  Positive for congestion and sinus pain.    Respiratory:  Positive for shortness of breath. Negative for cough, hemoptysis, sputum production and wheezing.    Cardiovascular: Negative.    Gastrointestinal:  Positive for abdominal pain, nausea and vomiting. Negative for blood in stool, constipation, diarrhea and melena.   Genitourinary: Negative.    Neurological:  Positive for headaches. Negative for dizziness, tingling, tremors, sensory change, speech change, focal weakness, seizures, loss of consciousness and weakness.          Reviewed family, medical, surgical, and social history.    Objective:      /76 (BP Location: Left arm, Patient Position: Sitting, BP Method: Large (Automatic))   Pulse 67   Temp 97.7 °F (36.5 °C) (Oral)   Ht 5' 6" (1.676 m)   Wt 70.8 kg (156 lb)   SpO2 96%   BMI 25.18 kg/m²   Physical Exam  Vitals and nursing note reviewed.   Constitutional:       General: She is not in acute distress.     Appearance: Normal appearance. She is normal weight. She is not ill-appearing, toxic-appearing or diaphoretic.   HENT:      Head: Normocephalic.      Right Ear: Tympanic membrane, ear canal and external ear " normal.      Left Ear: Tympanic membrane, ear canal and external ear normal.      Nose: Congestion present. No rhinorrhea.      Mouth/Throat:      Mouth: Mucous membranes are moist.      Pharynx: No oropharyngeal exudate or posterior oropharyngeal erythema.   Neck:      Vascular: No carotid bruit.      Comments: Thyroid appears large with soreness.   Cardiovascular:      Rate and Rhythm: Normal rate and regular rhythm.      Heart sounds: Normal heart sounds.   Pulmonary:      Effort: Pulmonary effort is normal.      Breath sounds: Normal breath sounds.   Abdominal:      General: Abdomen is flat. Bowel sounds are normal. There is no distension.      Palpations: Abdomen is soft. There is no mass.      Tenderness: There is abdominal tenderness. There is no right CVA tenderness, left CVA tenderness, guarding or rebound.      Hernia: No hernia is present.      Comments: Generalized abd soreness.    Musculoskeletal:      Cervical back: Normal range of motion and neck supple. Tenderness present. No rigidity.   Lymphadenopathy:      Cervical: No cervical adenopathy.   Skin:     General: Skin is warm and dry.      Capillary Refill: Capillary refill takes less than 2 seconds.   Neurological:      Mental Status: She is alert and oriented to person, place, and time.   Psychiatric:         Mood and Affect: Mood normal.         Behavior: Behavior normal.         Thought Content: Thought content normal.         Judgment: Judgment normal.            Office Visit on 06/06/2024   Component Date Value Ref Range Status    POC Molecular Influenza A Ag 06/06/2024 Negative  Negative Final    POC Molecular Influenza B Ag 06/06/2024 Negative  Negative Final     Acceptable 06/06/2024 Yes   Final    Color, UA 06/06/2024 Yellow   Final    Spec Grav UA 06/06/2024 1.020   Final    pH, UA 06/06/2024 7.0   Final    WBC, UA 06/06/2024 Small   Final    Nitrite, UA 06/06/2024 Negative   Final    Protein, POC 06/06/2024 Negative   Final     Glucose, UA 06/06/2024 Negative   Final    Ketones, UA 06/06/2024 Negative   Final    Bilirubin, POC 06/06/2024 Negative   Final    Urobilinogen, UA 06/06/2024 0.2   Final    Blood, UA 06/06/2024 Negative   Final    WBC 06/06/2024 4.70  4.50 - 11.00 K/uL Final    RBC 06/06/2024 4.80  4.20 - 5.40 M/uL Final    Hemoglobin 06/06/2024 10.8 (L)  12.0 - 16.0 g/dL Final    Hematocrit 06/06/2024 36.9 (L)  38.0 - 47.0 % Final    MCV 06/06/2024 76.9 (L)  80.0 - 96.0 fL Final    MCH 06/06/2024 22.5 (L)  27.0 - 31.0 pg Final    MCHC 06/06/2024 29.3 (L)  32.0 - 36.0 g/dL Final    RDW 06/06/2024 13.2  11.5 - 14.5 % Final    Platelet Count 06/06/2024 350  150 - 400 K/uL Final    MPV 06/06/2024 9.7  9.4 - 12.4 fL Final    Neutrophils % 06/06/2024 54.9  53.0 - 65.0 % Final    Lymphocytes % 06/06/2024 37.2  27.0 - 41.0 % Final    Monocytes % 06/06/2024 6.4 (H)  2.0 - 6.0 % Final    Eosinophils % 06/06/2024 1.1  1.0 - 4.0 % Final    Basophils % 06/06/2024 0.4  0.0 - 1.0 % Final    Immature Granulocytes % 06/06/2024 0.0  0.0 - 0.4 % Final    nRBC, Auto 06/06/2024 0.0  <=0.0 % Final    Neutrophils, Abs 06/06/2024 2.58  1.80 - 7.70 K/uL Final    Lymphocytes, Absolute 06/06/2024 1.75  1.00 - 4.80 K/uL Final    Monocytes, Absolute 06/06/2024 0.30  0.00 - 0.80 K/uL Final    Eosinophils, Absolute 06/06/2024 0.05  0.00 - 0.50 K/uL Final    Basophils, Absolute 06/06/2024 0.02  0.00 - 0.20 K/uL Final    Immature Granulocytes, Absolute 06/06/2024 0.00  0.00 - 0.04 K/uL Final    nRBC, Absolute 06/06/2024 0.00  <=0.00 x10e3/uL Final    Diff Type 06/06/2024 Auto   Final      Assessment:       1. Nausea and vomiting, unspecified vomiting type    2. Exposure to viral disease    3. Goiter    4. SOB (shortness of breath)    5. Nausea    6. Constipation, unspecified constipation type    7. Neck pain        Plan:       Nausea and vomiting, unspecified vomiting type  -     POCT URINALYSIS W/O SCOPE  -     X-Ray Abdomen AP 1 View; Future; Expected date:  06/06/2024  -     CBC Auto Differential; Future; Expected date: 06/06/2024  -     Comprehensive Metabolic Panel; Future; Expected date: 06/06/2024  -     Urine culture; Future; Expected date: 06/06/2024  -     ondansetron (ZOFRAN-ODT) 4 MG TbDL; Take 1 tablet (4 mg total) by mouth every 8 (eight) hours as needed (N/V).  Dispense: 15 tablet; Refill: 0  -     Discontinue: ondansetron injection 4 mg  -     ondansetron injection 4 mg    Exposure to viral disease  -     POCT Influenza A/B Molecular  -     Cancel: POCT COVID-19 Rapid Screening    Goiter  -     TSH; Future; Expected date: 06/06/2024  -     US Thyroid; Future; Expected date: 06/06/2024    SOB (shortness of breath)  -     X-Ray Chest PA And Lateral; Future; Expected date: 06/06/2024  -     dexAMETHasone injection 4 mg    Nausea    Constipation, unspecified constipation type  -     polyethylene glycol (GLYCOLAX) 17 gram/dose powder; Take 17 g by mouth once daily.  Dispense: 595 g; Refill: 5    Neck pain  -     dexAMETHasone injection 4 mg    Given a dose of Zofran (ondansetron) in the clinic. One hour after taking Zofran, start sipping Gatorade slowly (1-2 tsp) every 15 min for an hour. If holds this down, you can continue to go up on intake slowly. Advance diet to bland foods when tolerating liquids (dry toast, crackers, soup, grits, mashed potatoes without gravy.   Follow-up with us if no one has called you regarding an appointment for ultrasound of thyroid within 1 week. [phone]   Go to ER if vomiting does not stop or new symptoms.    RTC PRN          Risks, benefits, and side effects were discussed with the patient. All questions were answered to the fullest satisfaction of the patient, and pt verbalized understanding and agreement to treatment plan. Pt was to call with any new or worsening symptoms, or present to the ER.

## 2024-06-07 NOTE — PROGRESS NOTES
Notify labs look good overall. Mild anemia. Encourage Iron rich foods. Mildly elevated Alk phos. This is a liver enzyme. It has been elevated for last several times she has had it checked. Limit Tylenol and alcohol. F/U with pcp.

## 2024-06-08 LAB — UA COMPLETE W REFLEX CULTURE PNL UR: NORMAL

## 2024-06-11 ENCOUNTER — TELEPHONE (OUTPATIENT)
Dept: ORTHOPEDICS | Facility: CLINIC | Age: 39
End: 2024-06-11
Payer: COMMERCIAL

## 2024-06-12 ENCOUNTER — TELEPHONE (OUTPATIENT)
Dept: ORTHOPEDICS | Facility: CLINIC | Age: 39
End: 2024-06-12
Payer: COMMERCIAL

## 2024-06-12 NOTE — TELEPHONE ENCOUNTER
----- Message from Nina Delcid sent at 6/12/2024  8:31 AM CDT -----  Subha- Pt returned your call from yesterday. I could not reach you. Told pt she would recv call later.    Who Called: Keshia THELMA Shepherd    Patient is returning phone call    Who Left Message for Patient:Subha  Does the patient know what this is regarding?:Yes      Preferred Method of Contact: Phone Call  Patient's Preferred Phone Number on File: [phone]   Best Call Back Number, if different:  Additional Information:

## 2024-06-20 ENCOUNTER — OFFICE VISIT (OUTPATIENT)
Dept: ORTHOPEDICS | Facility: CLINIC | Age: 39
End: 2024-06-20
Payer: COMMERCIAL

## 2024-06-20 DIAGNOSIS — G89.29 CHRONIC LEFT SHOULDER PAIN: Chronic | ICD-10-CM

## 2024-06-20 DIAGNOSIS — M25.511 CHRONIC RIGHT SHOULDER PAIN: Primary | ICD-10-CM

## 2024-06-20 DIAGNOSIS — G89.29 CHRONIC RIGHT SHOULDER PAIN: Primary | ICD-10-CM

## 2024-06-20 DIAGNOSIS — M25.512 CHRONIC LEFT SHOULDER PAIN: Chronic | ICD-10-CM

## 2024-06-20 DIAGNOSIS — G56.03 CARPAL TUNNEL SYNDROME, BILATERAL: Chronic | ICD-10-CM

## 2024-06-20 PROCEDURE — 99999 PR PBB SHADOW E&M-EST. PATIENT-LVL II: CPT | Mod: PBBFAC,,, | Performed by: NURSE PRACTITIONER

## 2024-06-20 PROCEDURE — 20610 DRAIN/INJ JOINT/BURSA W/O US: CPT | Mod: PBBFAC | Performed by: NURSE PRACTITIONER

## 2024-06-20 PROCEDURE — 99999PBSHW PR PBB SHADOW TECHNICAL ONLY FILED TO HB: Mod: PBBFAC,,,

## 2024-06-20 PROCEDURE — 99212 OFFICE O/P EST SF 10 MIN: CPT | Mod: PBBFAC,25 | Performed by: NURSE PRACTITIONER

## 2024-06-20 RX ORDER — TRIAMCINOLONE ACETONIDE 40 MG/ML
80 INJECTION, SUSPENSION INTRA-ARTICULAR; INTRAMUSCULAR
Status: DISCONTINUED | OUTPATIENT
Start: 2024-06-20 | End: 2024-06-20 | Stop reason: HOSPADM

## 2024-06-20 RX ADMIN — TRIAMCINOLONE ACETONIDE 80 MG: 40 INJECTION, SUSPENSION INTRA-ARTICULAR; INTRAMUSCULAR at 10:06

## 2024-06-20 NOTE — PROGRESS NOTES
39 y.o. Female returns to clinic for a follow up visit regarding     ICD-10-CM ICD-9-CM   1. Carpal tunnel syndrome, bilateral  G56.03 354.0   2. Chronic left shoulder pain  M25.512 719.41    G89.29 338.29        Patient is here today for recheck of right shoulder pain.  She is also complaining of left shoulder pain.  Reports her left shoulder feels the same as her right has been feeling.  She has not had a recent injury.  She saw Dr. Henao for her right shoulder back in January, was put on an anti-inflammatory and completed 6 weeks of formal PT .  Reports she did not get any better.  On today she reports she is having pain in her left shoulder.  Reports it hurts daily.  Affects her activities.  Most of the pain is in the back of her shoulder.  Painful to fully lift overhead.  She has not had any treatment.       Past Medical History:   Diagnosis Date    Asthma     Cervical radiculopathy     Chronic pain syndrome     Depressive disorder     Fibromyalgia     GERD (gastroesophageal reflux disease)     Hyperlipidemia     Osteoarthritis     Palpitations     Radiculopathy of cervical region      Past Surgical History:   Procedure Laterality Date    CARPAL TUNNEL RELEASE Bilateral     EPIDURAL STEROID INJECTION N/A 12/29/2022    Procedure: Injection, Steroid, Epidural, L3/4;  Surgeon: Vanessa Walters MD;  Location: [facility];  Service: Pain Management;  Laterality: N/A;    EPIDURAL STEROID INJECTION N/A 9/5/2023    Procedure: Injection, Steroid, Epidural, L3/4;  Surgeon: Vanessa Walters MD;  Location: [facility];  Service: Pain Management;  Laterality: N/A;    EPIDURAL STEROID INJECTION N/A 1/23/2024    Procedure: Injection, Steroid, Epidural, L3/4;  Surgeon: Vanessa Walters MD;  Location: [facility];  Service: Pain Management;  Laterality: N/A;    HYSTERECTOMY      INJECTION OF ANESTHETIC AGENT INTO SACROILIAC JOINT Bilateral 5/9/2023    Procedure: BLOCK, SACROILIAC JOINT;  Surgeon: Vanessa  ANGELO Walters MD;  Location: [facility];  Service: Pain Management;  Laterality: Bilateral;    INJECTION OF ANESTHETIC AGENT INTO SACROILIAC JOINT Bilateral 2023    Procedure: BLOCK, SACROILIAC JOINT;  Surgeon: Vanessa Walters MD;  Location: [facility];  Service: Pain Management;  Laterality: Bilateral;         PHYSICAL EXAMINATION:    General    Constitutional: She is oriented to person, place, and time. She appears well-nourished.   HENT:   Head: Normocephalic and atraumatic.   Eyes: Pupils are equal, round, and reactive to light.   Neck: Neck supple.   Cardiovascular:  Normal rate and regular rhythm.            Pulmonary/Chest: Effort normal. No respiratory distress.   Abdominal: There is no abdominal tenderness. There is no guarding.   Neurological: She is alert and oriented to person, place, and time. She has normal reflexes.   Psychiatric: She has a normal mood and affect. Her behavior is normal. Judgment and thought content normal.         Back (L-Spine & T-Spine) / Neck (C-Spine) Exam     Tenderness   The patient is tender to palpation of the right scapular and left scapular.   Right Shoulder Exam     Inspection/Observation   Swelling: absent  Bruising: absent    Tenderness   The patient is tender to palpation of the acromioclavicular joint.    Range of Motion   Passive abduction:  normal   Extension:  normal     Tests & Signs   Cross arm: positive  Impingement: positive    Left Shoulder Exam     Inspection/Observation   Swelling: absent  Bruising: absent    Tenderness   The patient is tender to palpation of the acromioclavicular joint.    Range of Motion   Passive abduction:  normal   Extension:  normal     Tests & Signs   Cross arm: positive  Impingement: positive       Vascular Exam     Right Pulses      Radial:                    2+      Left Pulses      Radial:                    2+      IMAGIN:43    X-Ray Shoulder 2 or More Views Left    Result Date: 2024  EXAMINATION: XR  SHOULDER COMPLETE 2 OR MORE VIEWS LEFT CLINICAL HISTORY: shoulder pain; Pain in left shoulder COMPARISON: None available TECHNIQUE: XR SHOULDER 2 VIEWS LEFT FINDINGS: No evidence of fracture seen.  The alignment of the joints appears normal.  Mild shoulder degenerative change is present.  No soft tissue abnormality is seen.     Mild shoulder osteoarthrosis. Electronically signed by: Anthony Yanez Date:    06/05/2024 Time:    15:41    ASSESSMENT:      ICD-10-CM ICD-9-CM   1. Carpal tunnel syndrome, bilateral  G56.03 354.0   2. Chronic left shoulder pain  M25.512 719.41    G89.29 338.29       PLAN:     -Findings and treatment options were discussed with the patient  -All questions answered      We will set up 6 weeks of formal PT for her left shoulder.  A leave twice a day.  Bilateral shoulder injections today.  Patient reports she would like to see Dr. De Leon a return visit.  We will have her scheduled to come back in 8 weeks.    There are no Patient Instructions on file for this visit.      Orders Placed This Encounter   Procedures    Ambulatory referral/consult to Physical/Occupational Therapy         Large Joint Aspiration/Injection: R subacromial bursa    Date/Time: 6/20/2024 10:00 AM    Performed by: Kellye Walker FNP  Authorized by: Kelley Walker FNP    Consent Done?:  Yes (Verbal)  Indications:  Pain  Site marked: the procedure site was marked    Local anesthetic:  Bupivacaine 0.25% without epinephrine (4 cc's of 0.25% bupivicaine)    Details:  Needle Size:  22 G  Approach:  Posterior  Location:  Shoulder  Site:  R subacromial bursa  Medications:  80 mg triamcinolone acetonide 40 mg/mL  Patient tolerance:  Patient tolerated the procedure well with no immediate complications  Large Joint Aspiration/Injection: L subacromial bursa    Date/Time: 6/20/2024 10:00 AM    Performed by: Kelley Walker FNP  Authorized by: Kelley Walker FNP    Consent Done?:  Yes (Verbal)  Indications:  Pain  Site marked:  the procedure site was marked    Local anesthetic:  Bupivacaine 0.25% without epinephrine    Details:  Needle Size:  22 G  Approach:  Posterior  Location:  Shoulder  Site:  L subacromial bursa  Medications:  80 mg triamcinolone acetonide 40 mg/mL  Patient tolerance:  Patient tolerated the procedure well with no immediate complications

## 2024-06-21 ENCOUNTER — TELEPHONE (OUTPATIENT)
Dept: ORTHOPEDICS | Facility: CLINIC | Age: 39
End: 2024-06-21
Payer: COMMERCIAL

## 2024-06-21 DIAGNOSIS — G89.29 CHRONIC LEFT SHOULDER PAIN: Primary | ICD-10-CM

## 2024-06-21 DIAGNOSIS — M25.512 CHRONIC LEFT SHOULDER PAIN: Primary | ICD-10-CM

## 2024-06-21 NOTE — TELEPHONE ENCOUNTER
----- Message from Nina Delcid sent at 6/21/2024 11:53 AM CDT -----  Pts ins will only cover certain PT providers. She wants to use [facility] at [facility]. She had an appt here for July 1st but canceled it due to ins wouldn't cover.     Who Called: Keshia Shepherd    Does the patient already have the specialty appointment scheduled?:no  If yes, what is the date of that appointment?:  Referral to What Specialty:PT      Preferred Method of Contact: Phone Call  Patient's Preferred Phone Number on File: [phone]   Best Call Back Number, if different:  Additional Information:

## 2024-08-28 ENCOUNTER — OFFICE VISIT (OUTPATIENT)
Dept: PAIN MEDICINE | Facility: CLINIC | Age: 39
End: 2024-08-28
Payer: COMMERCIAL

## 2024-08-28 VITALS
SYSTOLIC BLOOD PRESSURE: 129 MMHG | BODY MASS INDEX: 24.59 KG/M2 | HEART RATE: 68 BPM | HEIGHT: 66 IN | WEIGHT: 153 LBS | DIASTOLIC BLOOD PRESSURE: 89 MMHG | RESPIRATION RATE: 18 BRPM

## 2024-08-28 DIAGNOSIS — M25.50 POLYARTHRALGIA: Chronic | ICD-10-CM

## 2024-08-28 DIAGNOSIS — G89.29 CHRONIC LEFT SHOULDER PAIN: Chronic | ICD-10-CM

## 2024-08-28 DIAGNOSIS — Z79.899 ENCOUNTER FOR LONG-TERM (CURRENT) USE OF OTHER MEDICATIONS: Primary | ICD-10-CM

## 2024-08-28 DIAGNOSIS — M25.512 CHRONIC LEFT SHOULDER PAIN: Chronic | ICD-10-CM

## 2024-08-28 DIAGNOSIS — M54.16 LUMBAR RADICULOPATHY, CHRONIC: Chronic | ICD-10-CM

## 2024-08-28 LAB

## 2024-08-28 PROCEDURE — 3008F BODY MASS INDEX DOCD: CPT | Mod: CPTII,,, | Performed by: PAIN MEDICINE

## 2024-08-28 PROCEDURE — 99214 OFFICE O/P EST MOD 30 MIN: CPT | Mod: S$PBB,,, | Performed by: PAIN MEDICINE

## 2024-08-28 PROCEDURE — 99215 OFFICE O/P EST HI 40 MIN: CPT | Mod: PBBFAC | Performed by: PAIN MEDICINE

## 2024-08-28 PROCEDURE — 99999PBSHW POCT URINE DRUG SCREEN PRESUMP: Mod: PBBFAC,,,

## 2024-08-28 PROCEDURE — 3079F DIAST BP 80-89 MM HG: CPT | Mod: CPTII,,, | Performed by: PAIN MEDICINE

## 2024-08-28 PROCEDURE — 3074F SYST BP LT 130 MM HG: CPT | Mod: CPTII,,, | Performed by: PAIN MEDICINE

## 2024-08-28 PROCEDURE — 80305 DRUG TEST PRSMV DIR OPT OBS: CPT | Mod: PBBFAC | Performed by: PAIN MEDICINE

## 2024-08-28 PROCEDURE — 1159F MED LIST DOCD IN RCRD: CPT | Mod: CPTII,,, | Performed by: PAIN MEDICINE

## 2024-08-28 PROCEDURE — 99999 PR PBB SHADOW E&M-EST. PATIENT-LVL V: CPT | Mod: PBBFAC,,, | Performed by: PAIN MEDICINE

## 2024-08-28 RX ORDER — ACETAMINOPHEN AND CODEINE PHOSPHATE 300; 30 MG/1; MG/1
1 TABLET ORAL EVERY 4 HOURS PRN
Qty: 60 TABLET | Refills: 0 | Status: SHIPPED | OUTPATIENT
Start: 2024-08-28 | End: 2024-09-27

## 2024-08-28 NOTE — PROGRESS NOTES
She Disclaimer: This note has been generated using voice-recognition software. There may be typographical errors that have been missed during proof-reading        Patient ID: Keshia Shepherd is a 39 y.o. female.      Chief Complaint: Low-back Pain, Knee Pain (bilateral), and Shoulder Pain (bilateral)      39-year-old female returns for re-evaluation of intractable lower back pain and radiculopathy.  She received an L3-4 epidural steroid injection January 23, 2024 and experienced greater than 80% pain relief for several months.  Her pain has gradually returned and now radiates to both hips, right greater than left.  She notes paresthesia of the lower extremities but denies weakness, bowel or bladder involvement.  Her pain is exacerbated with prolonged sitting and standing and improves slightly with medication management.  She is currently involved in physical therapy and has a pending appointment with Dr. Jeison De Leon for bilateral shoulder pain.  MRI of the lumbar spine from December 13, 2022 revealed L3-4 right disc bulge, facet degenerative changes, mild-to-moderate right foraminal stenosis, L4-5 disc bulge and facet degenerative changes and L5-S1 facet degenerative changes.            Pain Assessment  Pain Assessment: 0-10  Pain Score:   7  Pain Location: Back  Pain Orientation: Left, Right  Pain Radiating Towards: knees and shoulders  Pain Descriptors: Aching, Dull, Sharp  Pain Frequency: Constant/continuous  Pain Onset: Awakened from sleep  Clinical Progression: Gradually worsening  Aggravating Factors: Standing, Other (Comment) (lying down)  Pain Intervention(s): Home medication, Medication (See eMAR)      A's of Opioid Risk Assessment  Activity:Patient can perform ADL.   Analgesia:Patients pain is not controlled by current medication.   Adverse Effects: Patient denies constipation or sedation.  Aberrant Behavior:  reviewed with no aberrant drug seeking/taking behavior.      Patient denies any suicidal or  homicidal ideations    Physical Therapy/Home Exercise: yes, currently involved in physical therapy  X-Ray Chest PA And Lateral  Narrative: EXAMINATION:  XR CHEST PA AND LATERAL    CLINICAL HISTORY:  Shortness of breath    TECHNIQUE:  PA and lateral chest    COMPARISON:  02/04/2014, 02/06/2018, 07/30/2021, and 08/01/2023    FINDINGS:  Hyperinflation is present but no infiltrates or effusions are seen.  The cardiac size is normal.  Bony structures are normal.  Impression: There is hyperinflation but no evidence of acute disease.    Place of service: [facility]    Electronically signed by: Cinthia Lamb  Date:    06/06/2024  Time:    17:44  X-Ray Abdomen AP 1 View  Narrative: EXAMINATION:  XR ABDOMEN AP 1 VIEW    CLINICAL HISTORY:  Nausea with vomiting, unspecified    TECHNIQUE:  Abdomen, 2 supine views    COMPARISON:  None.    FINDINGS:  Abundant stool is present in the colon and rectosigmoid region but there is no suggestion of an obstruction.  No calcifications are seen overlying the kidneys.  Nonspecific bilateral pelvic calcifications are present.  Bony structures are normal.  Lung bases are clear.  Impression: Nonobstructive bowel gas pattern.    Place of service: [facility]    Electronically signed by: Cinthia Lamb  Date:    06/06/2024  Time:    17:43      Review of Systems   Constitutional: Negative.    HENT: Negative.     Eyes: Negative.    Respiratory: Negative.     Cardiovascular: Negative.    Gastrointestinal: Negative.    Endocrine: Negative.    Genitourinary: Negative.    Musculoskeletal:  Positive for arthralgias (Both shoulders), back pain, gait problem and leg pain (Bilateral lower extremities).   Integumentary:  Negative.   Neurological:  Positive for numbness (Bilateral lower extremities).   Hematological: Negative.    Psychiatric/Behavioral: Negative.               Past Medical History:   Diagnosis Date    Asthma     Cervical radiculopathy     Chronic pain syndrome      Depressive disorder     Fibromyalgia     GERD (gastroesophageal reflux disease)     Hyperlipidemia     Osteoarthritis     Palpitations     Radiculopathy of cervical region      Past Surgical History:   Procedure Laterality Date    CARPAL TUNNEL RELEASE Bilateral     EPIDURAL STEROID INJECTION N/A 12/29/2022    Procedure: Injection, Steroid, Epidural, L3/4;  Surgeon: Vanessa Walters MD;  Location: [facility];  Service: Pain Management;  Laterality: N/A;    EPIDURAL STEROID INJECTION N/A 9/5/2023    Procedure: Injection, Steroid, Epidural, L3/4;  Surgeon: Vanessa Walters MD;  Location: [facility];  Service: Pain Management;  Laterality: N/A;    EPIDURAL STEROID INJECTION N/A 1/23/2024    Procedure: Injection, Steroid, Epidural, L3/4;  Surgeon: Vanessa Walters MD;  Location: [facility];  Service: Pain Management;  Laterality: N/A;    HYSTERECTOMY      INJECTION OF ANESTHETIC AGENT INTO SACROILIAC JOINT Bilateral 5/9/2023    Procedure: BLOCK, SACROILIAC JOINT;  Surgeon: Vanessa Walters MD;  Location: [facility];  Service: Pain Management;  Laterality: Bilateral;    INJECTION OF ANESTHETIC AGENT INTO SACROILIAC JOINT Bilateral 6/6/2023    Procedure: BLOCK, SACROILIAC JOINT;  Surgeon: Vanessa Walters MD;  Location: [facility];  Service: Pain Management;  Laterality: Bilateral;     Social History     Socioeconomic History    Marital status: Single   Tobacco Use    Smoking status: Never     Passive exposure: Past    Smokeless tobacco: Never   Substance and Sexual Activity    Alcohol use: Never    Drug use: Not Currently    Sexual activity: Yes     Partners: Male     Family History   Problem Relation Name Age of Onset    Arthritis Mother      Cancer Mother      Depression Mother      Diabetes Mother      Heart disease Mother      Hypertension Mother      Stroke Mother      Asthma Daughter      Arthritis Maternal Grandmother      Cancer Maternal Grandmother      Depression  Maternal Grandmother      Diabetes Maternal Grandmother      Hypertension Maternal Grandmother      Heart disease Maternal Grandfather      Hypertension Maternal Grandfather      Cancer Paternal Grandmother      Depression Paternal Grandmother      Diabetes Paternal Grandmother       Review of patient's allergies indicates:   Allergen Reactions    Opioids - morphine analogues     Pcn [penicillins]     Pineapple Rash     has a current medication list which includes the following prescription(s): albuterol, celecoxib, mirtazapine, pantoprazole, polyethylene glycol, acetaminophen-codeine 300-30mg, ondansetron, ondansetron, and polyethylene glycol, and the following Facility-Administered Medications: lidocaine-epinephrine 1%-1:100,000.      Objective:  Vitals:    08/28/24 1035   BP: 129/89   Pulse: 68   Resp: 18        Physical Exam  Vitals and nursing note reviewed.   Constitutional:       General: She is not in acute distress.     Appearance: Normal appearance. She is not ill-appearing, toxic-appearing or diaphoretic.   HENT:      Head: Normocephalic and atraumatic.      Nose: Nose normal.      Mouth/Throat:      Mouth: Mucous membranes are moist.   Eyes:      Extraocular Movements: Extraocular movements intact.      Pupils: Pupils are equal, round, and reactive to light.   Cardiovascular:      Rate and Rhythm: Normal rate and regular rhythm.      Heart sounds: Normal heart sounds.   Pulmonary:      Effort: Pulmonary effort is normal. No respiratory distress.      Breath sounds: Normal breath sounds. No stridor. No wheezing or rhonchi.   Abdominal:      General: Bowel sounds are normal.      Palpations: Abdomen is soft.   Musculoskeletal:         General: No swelling or deformity.      Cervical back: Normal and normal range of motion. No spasms or tenderness. No pain with movement. Normal range of motion.      Thoracic back: Normal.      Lumbar back: Tenderness and bony tenderness present. No spasms. Decreased range  of motion. Negative right straight leg raise test and negative left straight leg raise test. No scoliosis.      Right lower leg: No edema.      Left lower leg: No edema.      Comments: Lumbar pain with flexion, extension and lateral rotation.  Lumbar spinous and paraspinous process tenderness from L4-S1.   Skin:     General: Skin is warm.   Neurological:      General: No focal deficit present.      Mental Status: She is alert and oriented to person, place, and time. Mental status is at baseline.      Cranial Nerves: No cranial nerve deficit.      Sensory: Sensation is intact. No sensory deficit.      Motor: No weakness.      Coordination: Coordination normal.      Gait: Gait abnormal.      Deep Tendon Reflexes: Reflexes are normal and symmetric.      Reflex Scores:       Patellar reflexes are 2+ on the right side and 2+ on the left side.       Achilles reflexes are 2+ on the left side.  Psychiatric:         Mood and Affect: Mood normal.         Behavior: Behavior normal.           Assessment:      1. Encounter for long-term (current) use of other medications    2. Lumbar radiculopathy, chronic    3. Chronic left shoulder pain    4. Polyarthralgia          Plan:  1. reviewed  2.Addiction, Dependency, Tolerance, Opioid abuse-misuse, Death, Diversion Discussed. Overdose reversal drug Naloxone discussed. Patient is prescribed opiates for chronic nonmalignant pain pathology.  Patient is receiving opiates which require greater than a 72 hour supply of therapy.  Patient was educated on potential dependency associated with long-term opioid use as well as decreasing efficacy with prolonged use.  Patient was advised of risks, benefits and side effects and how to utilize each medication.  Patient was also informed that any deviation from therapy protocol will  lead to discontinuation of opiates.  It is reasonable to prescribe opioid analgesics for patient based on positive response to opioid medications, lack of side  effects and  limited aberrant behavior.    3.Refill/Continue medications for pain control and function       Requested Prescriptions     Signed Prescriptions Disp Refills    acetaminophen-codeine 300-30mg (TYLENOL #3) 300-30 mg Tab 60 tablet 0     Sig: Take 1 tablet by mouth every 4 (four) hours as needed (pain).     4.Urine drug screen point of care obtained and consistent with prescribed medications and medication refill date.  Drug screen is to monitor compliance with prescribed opiates and to ensure that there was no misuse/abuse of other non-prescribed opioids or illicit drugs.  From this information I will determine if patient is suitable for opioid therapy  5. Schedule L3-4 epidural steroid injection and epidurogram under fluoroscopy  Orders Placed This Encounter   Procedures    POCT Urine Drug Screen ([facility])     Interpretive Information:     Negative:  No drug detected at the cut off level.   Positive:  This result represents presumptive positive for the   tested drug, other substances may yield a positive response other   than the analyte of interest. This result should be utilized for   diagnostic purpose only. Confirmation testing will be performed upon physician request only.       Case Request Operating Room: L3-4 CARMEN     Order Specific Question:   Medical Necessity:     Answer:   Medically Non-Urgent [100]     Order Specific Question:   CPT Code:     Answer:   [facility] INJ LUMBAR/SACRAL, W/IMAGING GUIDANCE [89138]     Order Specific Question:   Case classification     Answer:   E - Elective [90]     Order Specific Question:   Positioning:     Answer:   Prone [1003]     Order Specific Question:   Post-Procedure Disposition:     Answer:   PACU [1]     Order Specific Question:   Estimated Length of Stay:     Answer:   0 midnight     Order Specific Question:   Implant Required:     Answer:   No [1001]     Order Specific Question:   Is an on-site pathologist required for this procedure?     Answer:   N/A       6.Indications for this procedure for this specific patient include the following   -History, physical examination and concordant radiological image based diagnostic testing supports medical necessity for an epidural steroid injection  -neurogenic claudication due to central disc pathology, osteophyte complexes, severe degenerative disc disease producing foraminal or central stenosis  -repeat epidural steroid injection is medically necessary.  The 1st injection significantly provided improvement of at least 80 % sustained improvement in pain relief, improvement in function from baseline for least 3 months.  - Pt has been in pain for at least 6 weeks and has failed conservative care (e.g. Exercise, physical methods, NSAID/ and or muscle relaxants)   - Pt has no major risk factors for spinal cancer or contraindicated condition   - Neurogenic claudication and Radicular pain are interfering with functional activity  - Pain is associated with symptoms of nerve root irritation   - Any numbness documented is accompanied with paresthesia   - No evidence of systemic or local infection, bleeding tendency or unstable medical condition   - Epidural provided as part of a comprehensive pain management program  - All repeat injections have at least 80% pain relief and increase functional gain and physical activity, and reduction in reliance on the use of medication and or physical therapy  - Pt has significant functional limitation resulting in diminished quality of life and impaired age appropriate ADL's.   - Diagnostic evaluation has ruled out other causes of pain  - Pt participating in an active rehabilitation program or home exercise program which has been discussed with the patient including heat ice and rest  - No more than 3 epidurals will be done in a 6 month period at the same level with at least 7 days between injections  - MAC is only offered in cases of needle phobia   - Injection done at L3-4 level  which is  consistent with patient's dermatomal pain complaint    7.Monitored Anesthesia Care medical necessity authorization request:    Monitor anesthesia request is medically indicated for the scheduled nerve block procedure due to:  - needle phobia and anxiety, placing  the patient at risk during the provided service.  -patient has an ASA class greater than 3 and requires constant presence of an anesthesiologist during the procedure:  -patient has severe problems with muscles and muscle spasticity that makes it hard to lie still  -patient suffers from chronic pain and is unable to function due to  diminished ADLs    8.The planned medically necessary  surgical procedure is performed in a hospital outpatient department and not in an ambulatory surgical center due to:     -there is no geographically assessable ambulatory surgery center that has the  necessary equipment and fluoroscopy needed for the procedure     -there is no geographically assessable ambulatory surgical center available at which the physician has privileges     -an ASC's  specific  guideline regarding the individuals weight or health conditions that prevent the use of an ASC       -injections must be performed under fluoroscopy image guidance with contrast unless the patient has a documented contrast allergy or pregnancy          report:  Reviewed and consistent with medication use as prescribed.      The total time spent for evaluation and management on 08/29/2024 including reviewing separately obtained history, performing a medically appropriate exam and evaluation, documenting clinical information in the health record, independently interpreting results and communicating them to the patient/family/caregiver, and ordering medications/tests/procedures was between 15-29 minutes.    The above plan and management options were discussed at length with patient. Patient is in agreement with the above and verbalized understanding. It will be communicated with  the referring physician via electronic record, fax, or mail.

## 2024-08-28 NOTE — PATIENT INSTRUCTIONS
YOU ARE SCHEDULED ON 09/12/2024 AT 7:45 AM FOR YOUR PROCEDURE- L3-4 CARMEN WITH  .      HOLD CELEBREX FOR 3 DAYS BEFORE YOUR PROCEDURE        Procedure Instructions:    Nothing to eat or drink for 8 hours or after midnight including gum, candy, mints, or tobacco products.  If you are scheduled for 1:30 or later nothing to eat or drink after 5 a.m. the morning of the procedure, including gum, candy, mints, or tobacco products.  Must have a  at least 18 yrs of age to stay present at all times  No Diabetic medications the morning of procedure, check blood sugar the morning of procedure, if it is greater than 200 call the office at [phone]  If you are started on antibiotics or have been prescribed antibiotics, have a fever, or have any other type of infection call to reschedule procedure.  If you take blood pressure medications you can take it at your regular scheduled time with a small sip of WATER!  Dentures are to be removed prior to procedure or we can provide you with a denture cup to remove them prior to being taken back for procedure.   False eyelashes are to be removed before the morning of procedure.    Contacts will have to be removed prior to procedure.  No jewelry is to be worn to the procedure.     HOLD ASPIRIN AND ASPIRIN PRODUCTS  (ASPIRIN, BC POWDER ETC. ) FOR 7 DAYS  PRIOR TO PROCEDURE  HOLD NSAIDS( ibuprofen, mobic, meloxicam, advil, diclofenac, naproxen, relafen, celebrex,  methotrexate, aleve etc....)  FOR 3 DAYS   PRIOR TO PROCEDURE        SIGNATURE:__________________________________________________________________________________________________

## 2024-10-01 ENCOUNTER — ANESTHESIA EVENT (OUTPATIENT)
Dept: PAIN MEDICINE | Facility: HOSPITAL | Age: 39
End: 2024-10-01
Payer: COMMERCIAL

## 2024-10-01 ENCOUNTER — ANESTHESIA (OUTPATIENT)
Dept: PAIN MEDICINE | Facility: HOSPITAL | Age: 39
End: 2024-10-01
Payer: COMMERCIAL

## 2024-10-01 ENCOUNTER — HOSPITAL ENCOUNTER (OUTPATIENT)
Facility: HOSPITAL | Age: 39
Discharge: HOME | End: 2024-10-01
Attending: PAIN MEDICINE | Admitting: PAIN MEDICINE
Payer: COMMERCIAL

## 2024-10-01 VITALS
DIASTOLIC BLOOD PRESSURE: 82 MMHG | RESPIRATION RATE: 18 BRPM | HEIGHT: 66 IN | OXYGEN SATURATION: 100 % | SYSTOLIC BLOOD PRESSURE: 135 MMHG | WEIGHT: 157 LBS | BODY MASS INDEX: 25.23 KG/M2 | TEMPERATURE: 98 F | HEART RATE: 74 BPM

## 2024-10-01 DIAGNOSIS — M54.16 LUMBAR RADICULOPATHY, CHRONIC: Primary | Chronic | ICD-10-CM

## 2024-10-01 DIAGNOSIS — M54.16 LUMBAR RADICULOPATHY: ICD-10-CM

## 2024-10-01 PROCEDURE — 25500020 PHARM REV CODE 255: Performed by: PAIN MEDICINE

## 2024-10-01 PROCEDURE — 63600175 PHARM REV CODE 636 W HCPCS: Performed by: PAIN MEDICINE

## 2024-10-01 PROCEDURE — 62323 NJX INTERLAMINAR LMBR/SAC: CPT | Performed by: PAIN MEDICINE

## 2024-10-01 PROCEDURE — 62323 NJX INTERLAMINAR LMBR/SAC: CPT | Mod: ,,, | Performed by: PAIN MEDICINE

## 2024-10-01 PROCEDURE — 63600175 PHARM REV CODE 636 W HCPCS: Performed by: NURSE ANESTHETIST, CERTIFIED REGISTERED

## 2024-10-01 PROCEDURE — 37000008 HC ANESTHESIA 1ST 15 MINUTES: Performed by: PAIN MEDICINE

## 2024-10-01 PROCEDURE — 25000003 PHARM REV CODE 250: Performed by: PAIN MEDICINE

## 2024-10-01 RX ORDER — SODIUM CHLORIDE 9 MG/ML
INJECTION, SOLUTION INTRAVENOUS CONTINUOUS
Status: DISCONTINUED | OUTPATIENT
Start: 2024-10-01 | End: 2024-10-01 | Stop reason: HOSPADM

## 2024-10-01 RX ORDER — LIDOCAINE HYDROCHLORIDE 10 MG/ML
INJECTION, SOLUTION EPIDURAL; INFILTRATION; INTRACAUDAL; PERINEURAL
Status: DISCONTINUED | OUTPATIENT
Start: 2024-10-01 | End: 2024-10-01

## 2024-10-01 RX ORDER — PROPOFOL 10 MG/ML
INJECTION, EMULSION INTRAVENOUS
Status: DISCONTINUED | OUTPATIENT
Start: 2024-10-01 | End: 2024-10-01

## 2024-10-01 RX ORDER — TRIAMCINOLONE ACETONIDE 40 MG/ML
INJECTION, SUSPENSION INTRA-ARTICULAR; INTRAMUSCULAR CODE/TRAUMA/SEDATION MEDICATION
Status: DISCONTINUED | OUTPATIENT
Start: 2024-10-01 | End: 2024-10-01 | Stop reason: HOSPADM

## 2024-10-01 RX ORDER — IOPAMIDOL 612 MG/ML
INJECTION, SOLUTION INTRATHECAL CODE/TRAUMA/SEDATION MEDICATION
Status: DISCONTINUED | OUTPATIENT
Start: 2024-10-01 | End: 2024-10-01 | Stop reason: HOSPADM

## 2024-10-01 RX ORDER — LIDOCAINE HYDROCHLORIDE 10 MG/ML
INJECTION, SOLUTION INFILTRATION; PERINEURAL CODE/TRAUMA/SEDATION MEDICATION
Status: DISCONTINUED | OUTPATIENT
Start: 2024-10-01 | End: 2024-10-01 | Stop reason: HOSPADM

## 2024-10-01 RX ADMIN — PROPOFOL 70 MG: 10 INJECTION, EMULSION INTRAVENOUS at 08:10

## 2024-10-01 RX ADMIN — PROPOFOL 40 MG: 10 INJECTION, EMULSION INTRAVENOUS at 08:10

## 2024-10-01 RX ADMIN — LIDOCAINE HYDROCHLORIDE 50 MG: 10 SOLUTION INTRAVENOUS at 08:10

## 2024-10-01 RX ADMIN — PROPOFOL 30 MG: 10 INJECTION, EMULSION INTRAVENOUS at 08:10

## 2024-10-01 RX ADMIN — SODIUM CHLORIDE: 9 INJECTION, SOLUTION INTRAVENOUS at 08:10

## 2024-10-01 NOTE — OP NOTE
"Procedure Note    Procedure Date: 10/1/2024    Procedure Performed:  Lumbar interlaminar epidural steroid injection under fluoroscopy at L L3-4    Indications: Patient failed conservative therapy.      Pre-op diagnosis: Lumbar Radiculopathy    Post-op diagnosis: same    Physician: Vanessa Walters MD    Anesthesia: MAC    Medications injected: Kenalog 40mg,  2 mL sterile preservative-free normal saline.    Local anesthetic used: 1% Lidocaine, 3 ml    Estimated Blood Loss:  None    Complications:  None    Technique:  The patient was interviewed in the holding area and Risks/Benefits were discussed, including, but not limited to, the possibility of new or different pain, bleeding or infection.   All questions were answered.  The patient understood and accepted risks.  Consent was verified and signed.   A time-out was taken to identify patient and procedure prior to starting the procedure. The patient was placed in the prone position on the fluoroscopy table. The area of the lumbar spine was prepped with Chloraprep and draped in a sterile manner. The L L3-4  interspace was identified and marked under AP fluoroscopy. The skin and subcutaneous tissues overlying the targeted interspace were anesthetized with 3-5 mL of 1% lidocaine using a 25G 1.5" needle.  A 20G  3.5" Tuohy epidural needle was directed toward the interspace under fluoroscopic guidance until the ligamentum flavum was engaged. From this point, a loss of resistance technique with a pulsator syringe a was used to identify entrance of the needle into the epidural space. Once loss of resistance was observed 3mL of Isovue contrast solution was injected. An appropriate epidurogram was noted.  A 3mL mixture consisting of saline and 40 mg of kenalog was injected slowly and without resistance.  The needle was  removed and a sterile Band-Aid dressing was applied to the puncture site.  The patient tolerated the procedure well and was transferred to the .AC. in stable " condition.  The patient was monitored after the procedure and was given post-procedure and discharge instructions to follow at home. The patient was discharged in a stable condition and accompanied by an adult .    Epidurogram:  5 mL allotment of Isovue M 300 contrast revealed excellent delineation from L 2-4. There were no filling defects or obstruction to dye flow noted.  There was no  intravascular or intrathecal spread noted with dye flow.

## 2024-10-01 NOTE — ANESTHESIA PREPROCEDURE EVALUATION
10/01/2024  Keshia Shepherd is a 39 y.o., female.      Pre-op Assessment    I have reviewed the Patient Summary Reports.     I have reviewed the Nursing Notes. I have reviewed the NPO Status.   I have reviewed the Medications.     Review of Systems  Anesthesia Hx:  No problems with previous Anesthesia                Social:  Smoker       Pulmonary:    Asthma       Asthma:               Hepatic/GI:     GERD             Musculoskeletal:  Arthritis               Neurological:    Neuromuscular Disease,  Headaches      Dx of Headaches                         Neuromuscular Disease   Psych:  Psychiatric History                  Physical Exam  General: Well nourished, Alert, Oriented and Cooperative    Airway:  Mallampati: III   Mouth Opening: Normal  Neck ROM: Normal ROM    Dental:  Intact        Anesthesia Plan  Type of Anesthesia, risks & benefits discussed:    Anesthesia Type: Gen Natural Airway, MAC  Intra-op Monitoring Plan: Standard ASA Monitors  Post Op Pain Control Plan: multimodal analgesia and IV/PO Opioids PRN  Induction:  IV  Informed Consent: Informed consent signed with the Patient and all parties understand the risks and agree with anesthesia plan.  All questions answered. Patient consented to blood products? Yes  ASA Score: 2  Day of Surgery Review of History & Physical: I have interviewed and examined the patient. I have reviewed the patient's H&P dated: There are no significant changes.     Ready For Surgery From Anesthesia Perspective.     .

## 2024-10-01 NOTE — H&P
She Disclaimer: This note has been generated using voice-recognition software. There may be typographical errors that have been missed during proof-reading        Patient ID: Keshia Shepherd is a 39 y.o. female.      Chief Complaint: No chief complaint on file.      39-year-old female returns for re-evaluation of intractable lower back pain and radiculopathy.  She received an L3-4 epidural steroid injection January 23, 2024 and experienced greater than 80% pain relief for several months.  Her pain has gradually returned and now radiates to both hips, right greater than left.  She notes paresthesia of the lower extremities but denies weakness, bowel or bladder involvement.  Her pain is exacerbated with prolonged sitting and standing and improves slightly with medication management.  She is currently involved in physical therapy and has a pending appointment with Dr. Jeison De Leon for bilateral shoulder pain.  MRI of the lumbar spine from December 13, 2022 revealed L3-4 right disc bulge, facet degenerative changes, mild-to-moderate right foraminal stenosis, L4-5 disc bulge and facet degenerative changes and L5-S1 facet degenerative changes.                   A's of Opioid Risk Assessment  Activity:Patient can perform ADL.   Analgesia:Patients pain is not controlled by current medication.   Adverse Effects: Patient denies constipation or sedation.  Aberrant Behavior:  reviewed with no aberrant drug seeking/taking behavior.      Patient denies any suicidal or homicidal ideations    Physical Therapy/Home Exercise: yes, currently involved in physical therapy  X-Ray Chest PA And Lateral  Narrative: EXAMINATION:  XR CHEST PA AND LATERAL    CLINICAL HISTORY:  Shortness of breath    TECHNIQUE:  PA and lateral chest    COMPARISON:  02/04/2014, 02/06/2018, 07/30/2021, and 08/01/2023    FINDINGS:  Hyperinflation is present but no infiltrates or effusions are seen.  The cardiac size is normal.  Bony structures are  normal.  Impression: There is hyperinflation but no evidence of acute disease.    Place of service: [facility]    Electronically signed by: Cinthia Lamb  Date:    06/06/2024  Time:    17:44  X-Ray Abdomen AP 1 View  Narrative: EXAMINATION:  XR ABDOMEN AP 1 VIEW    CLINICAL HISTORY:  Nausea with vomiting, unspecified    TECHNIQUE:  Abdomen, 2 supine views    COMPARISON:  None.    FINDINGS:  Abundant stool is present in the colon and rectosigmoid region but there is no suggestion of an obstruction.  No calcifications are seen overlying the kidneys.  Nonspecific bilateral pelvic calcifications are present.  Bony structures are normal.  Lung bases are clear.  Impression: Nonobstructive bowel gas pattern.    Place of service: [facility]    Electronically signed by: Cinthia Lamb  Date:    06/06/2024  Time:    17:43      Review of Systems   Constitutional: Negative.    HENT: Negative.     Eyes: Negative.    Respiratory: Negative.     Cardiovascular: Negative.    Gastrointestinal: Negative.    Endocrine: Negative.    Genitourinary: Negative.    Musculoskeletal:  Positive for arthralgias (Both shoulders), back pain, gait problem and leg pain (Bilateral lower extremities).   Integumentary:  Negative.   Neurological:  Positive for numbness (Bilateral lower extremities).   Hematological: Negative.    Psychiatric/Behavioral: Negative.               Past Medical History:   Diagnosis Date    Asthma     Cervical radiculopathy     Chronic pain syndrome     Depressive disorder     Fibromyalgia     GERD (gastroesophageal reflux disease)     Hyperlipidemia     Osteoarthritis     Palpitations     Radiculopathy of cervical region      Past Surgical History:   Procedure Laterality Date    CARPAL TUNNEL RELEASE Bilateral     EPIDURAL STEROID INJECTION N/A 12/29/2022    Procedure: Injection, Steroid, Epidural, L3/4;  Surgeon: Vanessa Walters MD;  Location: [facility];  Service: Pain Management;   Laterality: N/A;    EPIDURAL STEROID INJECTION N/A 9/5/2023    Procedure: Injection, Steroid, Epidural, L3/4;  Surgeon: Vanessa Walters MD;  Location: [facility];  Service: Pain Management;  Laterality: N/A;    EPIDURAL STEROID INJECTION N/A 1/23/2024    Procedure: Injection, Steroid, Epidural, L3/4;  Surgeon: Vanessa Walters MD;  Location: [facility];  Service: Pain Management;  Laterality: N/A;    HYSTERECTOMY      INJECTION OF ANESTHETIC AGENT INTO SACROILIAC JOINT Bilateral 5/9/2023    Procedure: BLOCK, SACROILIAC JOINT;  Surgeon: Vanessa Walters MD;  Location: [facility];  Service: Pain Management;  Laterality: Bilateral;    INJECTION OF ANESTHETIC AGENT INTO SACROILIAC JOINT Bilateral 6/6/2023    Procedure: BLOCK, SACROILIAC JOINT;  Surgeon: Vanessa Walters MD;  Location: [facility];  Service: Pain Management;  Laterality: Bilateral;     Social History     Socioeconomic History    Marital status: Single   Tobacco Use    Smoking status: Never     Passive exposure: Past    Smokeless tobacco: Never   Substance and Sexual Activity    Alcohol use: Never    Drug use: Not Currently    Sexual activity: Yes     Partners: Male     Family History   Problem Relation Name Age of Onset    Arthritis Mother      Cancer Mother      Depression Mother      Diabetes Mother      Heart disease Mother      Hypertension Mother      Stroke Mother      Asthma Daughter      Arthritis Maternal Grandmother      Cancer Maternal Grandmother      Depression Maternal Grandmother      Diabetes Maternal Grandmother      Hypertension Maternal Grandmother      Heart disease Maternal Grandfather      Hypertension Maternal Grandfather      Cancer Paternal Grandmother      Depression Paternal Grandmother      Diabetes Paternal Grandmother       Review of patient's allergies indicates:   Allergen Reactions    Opioids - morphine analogues     Pcn [penicillins]     Pineapple Rash     has a current medication list  which includes the following prescription(s): albuterol, celecoxib, mirtazapine, pantoprazole, polyethylene glycol, acetaminophen-codeine 300-30mg, ondansetron, ondansetron, and polyethylene glycol, and the following Facility-Administered Medications: lidocaine-epinephrine 1%-1:100,000.      Objective:  Vitals:    10/01/24 0736   BP: 117/79   Pulse: 71   Resp: 16   Temp: 97.7 °F (36.5 °C)        Physical Exam  Vitals and nursing note reviewed.   Constitutional:       General: She is not in acute distress.     Appearance: Normal appearance. She is not ill-appearing, toxic-appearing or diaphoretic.   HENT:      Head: Normocephalic and atraumatic.      Nose: Nose normal.      Mouth/Throat:      Mouth: Mucous membranes are moist.   Eyes:      Extraocular Movements: Extraocular movements intact.      Pupils: Pupils are equal, round, and reactive to light.   Cardiovascular:      Rate and Rhythm: Normal rate and regular rhythm.      Heart sounds: Normal heart sounds.   Pulmonary:      Effort: Pulmonary effort is normal. No respiratory distress.      Breath sounds: Normal breath sounds. No stridor. No wheezing or rhonchi.   Abdominal:      General: Bowel sounds are normal.      Palpations: Abdomen is soft.   Musculoskeletal:         General: No swelling or deformity.      Cervical back: Normal and normal range of motion. No spasms or tenderness. No pain with movement. Normal range of motion.      Thoracic back: Normal.      Lumbar back: Tenderness and bony tenderness present. No spasms. Decreased range of motion. Negative right straight leg raise test and negative left straight leg raise test. No scoliosis.      Right lower leg: No edema.      Left lower leg: No edema.      Comments: Lumbar pain with flexion, extension and lateral rotation.  Lumbar spinous and paraspinous process tenderness from L4-S1.   Skin:     General: Skin is warm.   Neurological:      General: No focal deficit present.      Mental Status: She is alert  and oriented to person, place, and time. Mental status is at baseline.      Cranial Nerves: No cranial nerve deficit.      Sensory: Sensation is intact. No sensory deficit.      Motor: No weakness.      Coordination: Coordination normal.      Gait: Gait abnormal.      Deep Tendon Reflexes: Reflexes are normal and symmetric.      Reflex Scores:       Patellar reflexes are 2+ on the right side and 2+ on the left side.       Achilles reflexes are 2+ on the left side.  Psychiatric:         Mood and Affect: Mood normal.         Behavior: Behavior normal.           Assessment:      1. Lumbar radiculopathy, chronic    2. Lumbar radiculopathy          Plan:  1. reviewed  2.Addiction, Dependency, Tolerance, Opioid abuse-misuse, Death, Diversion Discussed. Overdose reversal drug Naloxone discussed. Patient is prescribed opiates for chronic nonmalignant pain pathology.  Patient is receiving opiates which require greater than a 72 hour supply of therapy.  Patient was educated on potential dependency associated with long-term opioid use as well as decreasing efficacy with prolonged use.  Patient was advised of risks, benefits and side effects and how to utilize each medication.  Patient was also informed that any deviation from therapy protocol will  lead to discontinuation of opiates.  It is reasonable to prescribe opioid analgesics for patient based on positive response to opioid medications, lack of side effects and  limited aberrant behavior.    3.Refill/Continue medications for pain control and function       Requested Prescriptions     Signed Prescriptions Disp Refills    acetaminophen-codeine 300-30mg (TYLENOL #3) 300-30 mg Tab 60 tablet 0     Sig: Take 1 tablet by mouth every 4 (four) hours as needed (pain).     4.Urine drug screen point of care obtained and consistent with prescribed medications and medication refill date.  Drug screen is to monitor compliance with prescribed opiates and to ensure that there was no  misuse/abuse of other non-prescribed opioids or illicit drugs.  From this information I will determine if patient is suitable for opioid therapy  5. Schedule L3-4 epidural steroid injection and epidurogram under fluoroscopy  Orders Placed This Encounter   Procedures    [place] Fluoro for Pain Management     Standing Status:   Standing     Number of Occurrences:   1     Order Specific Question:   Reason for exam:     Answer:   L3-4 CARMEN     Order Specific Question:   Is the patient pregnant?     Answer:   No     Order Specific Question:   May the Radiologist modify the order per protocol to meet the clinical needs of the patient?     Answer:   Yes     Order Specific Question:   Release to patient     Answer:   Immediate    Diet NPO     6 hours prior to case     Standing Status:   Standing     Number of Occurrences:   1    Vital signs     Standing Status:   Standing     Number of Occurrences:   1    Verify informed consent     Standing Status:   Standing     Number of Occurrences:   1    Saline lock IV     Standing Status:   Standing     Number of Occurrences:   1    Notify physician      Standing Status:   Standing     Number of Occurrences:   1     Order Specific Question:   Systolic Blood Pressure SBP greater than or equal to     Answer:   200     Order Specific Question:   Diastolic Blood Pressure DBP greater than or equal to     Answer:   100    Pulse Oximetry Continuous     Standing Status:   Standing     Number of Occurrences:   1    POCT urine pregnancy     Standing Status:   Standing     Number of Occurrences:   1    POCT glucose     On arrival for diabetics     Standing Status:   Standing     Number of Occurrences:   1    Place 18-22 gauage peripheral IV      Can place 24 gauge IV for difficult IV access     Standing Status:   Standing     Number of Occurrences:   1    Place in Outpatient     Standing Status:   Standing     Number of Occurrences:   1     Order Specific Question:   Admitting Provider     Answer:    ZOYA ARAGON [[id number]]     Order Specific Question:   Diagnosis     Answer:   Lumbar radiculopathy [[id number]]     Order Specific Question:   Is the Future Attending Known?     Answer:   Yes     Order Specific Question:   Future Attending Provider     Answer:   ZOYA ARAGON [[id number]]     Order Specific Question:   Special Needs:     Answer:   No Special Needs [1]      6.Indications for this procedure for this specific patient include the following   -History, physical examination and concordant radiological image based diagnostic testing supports medical necessity for an epidural steroid injection  -neurogenic claudication due to central disc pathology, osteophyte complexes, severe degenerative disc disease producing foraminal or central stenosis  -repeat epidural steroid injection is medically necessary.  The 1st injection significantly provided improvement of at least 80 % sustained improvement in pain relief, improvement in function from baseline for least 3 months.  - Pt has been in pain for at least 6 weeks and has failed conservative care (e.g. Exercise, physical methods, NSAID/ and or muscle relaxants)   - Pt has no major risk factors for spinal cancer or contraindicated condition   - Neurogenic claudication and Radicular pain are interfering with functional activity  - Pain is associated with symptoms of nerve root irritation   - Any numbness documented is accompanied with paresthesia   - No evidence of systemic or local infection, bleeding tendency or unstable medical condition   - Epidural provided as part of a comprehensive pain management program  - All repeat injections have at least 80% pain relief and increase functional gain and physical activity, and reduction in reliance on the use of medication and or physical therapy  - Pt has significant functional limitation resulting in diminished quality of life and impaired age appropriate ADL's.   - Diagnostic evaluation has ruled out other causes of  pain  - Pt participating in an active rehabilitation program or home exercise program which has been discussed with the patient including heat ice and rest  - No more than 3 epidurals will be done in a 6 month period at the same level with at least 7 days between injections  - MAC is only offered in cases of needle phobia   - Injection done at L3-4 level  which is consistent with patient's dermatomal pain complaint    7.Monitored Anesthesia Care medical necessity authorization request:    Monitor anesthesia request is medically indicated for the scheduled nerve block procedure due to:  - needle phobia and anxiety, placing  the patient at risk during the provided service.  -patient has an ASA class greater than 3 and requires constant presence of an anesthesiologist during the procedure:  -patient has severe problems with muscles and muscle spasticity that makes it hard to lie still  -patient suffers from chronic pain and is unable to function due to  diminished ADLs    8.The planned medically necessary  surgical procedure is performed in a hospital outpatient department and not in an ambulatory surgical center due to:     -there is no geographically assessable ambulatory surgery center that has the  necessary equipment and fluoroscopy needed for the procedure     -there is no geographically assessable ambulatory surgical center available at which the physician has privileges     -an ASC's  specific  guideline regarding the individuals weight or health conditions that prevent the use of an ASC       -injections must be performed under fluoroscopy image guidance with contrast unless the patient has a documented contrast allergy or pregnancy          report:  Reviewed and consistent with medication use as prescribed.      The total time spent for evaluation and management on 10/01/2024 including reviewing separately obtained history, performing a medically appropriate exam and evaluation, documenting clinical  information in the health record, independently interpreting results and communicating them to the patient/family/caregiver, and ordering medications/tests/procedures was between 15-29 minutes.    The above plan and management options were discussed at length with patient. Patient is in agreement with the above and verbalized understanding. It will be communicated with the referring physician via electronic record, fax, or mail.    Review of Systems   Constitutional: Negative.    HENT: Negative.     Eyes: Negative.    Respiratory: Negative.     Cardiovascular: Negative.    Gastrointestinal: Negative.    Genitourinary: Negative.    Musculoskeletal:  Positive for arthralgias (Both shoulders), back pain and gait problem.   Skin: Negative.    Neurological:  Positive for numbness (Bilateral lower extremities).   Psychiatric/Behavioral: Negative.

## 2024-10-01 NOTE — PLAN OF CARE
Plan:  D/c pt via wheelchair at 0915  Informed pt if does not void in 8 hours to go to ER. Notify if redness, drainage, from injection site or fever over next 3-4 days. Rest and drink plenty of fluids for the remainder of the day. No lifting over 5 lbs. For the remainder of the day. Continue regular medications as prescribed. May take pain medications as prescribed.     Pain improved 100%  Pre-procedure pain: 8  Post-procedure pain: 0

## 2024-10-01 NOTE — ANESTHESIA POSTPROCEDURE EVALUATION
Anesthesia Post Evaluation    Patient: Keshia Shepherd    Procedure(s) Performed: Procedure(s) (LRB):  L3-4 CARMEN (Bilateral)    Final Anesthesia Type: MAC      Patient location during evaluation: PACU  Patient participation: Yes- Able to Participate  Level of consciousness: awake and alert and oriented  Post-procedure vital signs: reviewed and stable  Pain management: adequate  Airway patency: patent    PONV status at discharge: No PONV  Anesthetic complications: no      Cardiovascular status: blood pressure returned to baseline and hemodynamically stable  Respiratory status: unassisted  Hydration status: euvolemic  Follow-up not needed.  Comments: Refer to nursing note for pain/jyoti score upon discharge from recovery.              Vitals Value Taken Time   /88 10/01/24 0841   Temp  10/01/24 0844   Pulse 84 10/01/24 0843   Resp 21 10/01/24 0843   SpO2 100 % 10/01/24 0842   Vitals shown include unfiled device data.      No case tracking events are documented in the log.      Pain/Jyoti Score: Jyoti Score: 9 (10/1/2024  8:40 AM)

## 2024-10-01 NOTE — DISCHARGE SUMMARY
[facility] - Pain Management  Discharge Note  Short Stay    Procedure(s) (LRB):  L3-4 CARMEN (Bilateral)      OUTCOME: Patient tolerated treatment/procedure well without complication and is now ready for discharge.    DISPOSITION: Home or Self Care    FINAL DIAGNOSIS:  Lumbar radiculopathy    FOLLOWUP: In clinic    DISCHARGE INSTRUCTIONS:  See nurse's notes     TIME SPENT ON DISCHARGE: 5 minutes

## 2024-10-01 NOTE — TRANSFER OF CARE
"Anesthesia Transfer of Care Note    Patient: Keshia Shepherd    Procedure(s) Performed: Procedure(s) (LRB):  L3-4 CARMEN (Bilateral)    Patient location: PACU    Anesthesia Type: MAC    Transport from OR: Transported from OR on room air with adequate spontaneous ventilation    Post pain: adequate analgesia    Post assessment: no apparent anesthetic complications and tolerated procedure well    Post vital signs: stable    Level of consciousness: alert and responds to stimulation    Nausea/Vomiting: no nausea/vomiting    Complications: none    Transfer of care protocol was followed      Last vitals: Visit Vitals  BP (!) 96/57   Pulse 81   Temp 36.5 °C (97.7 °F) (Oral)   Resp 12   Ht 5' 6" (1.676 m)   Wt 71.2 kg (157 lb)   SpO2 100%   BMI 25.34 kg/m²     "

## 2024-10-01 NOTE — BRIEF OP NOTE
The  Discharge Note  Short Stay    Admit Date: 10/1/2024    Discharge Date: 10/1/2024    Attending Physician: Vanessa Walters     Discharge Provider: Vanessa Walters    Diagnosis:  Lumbar radiculopathy    Procedure performed:  L3-4 epidural steroid injection and epidurogram under fluoroscopy    Findings: Procedure tolerated well and without complications. Consistent with diagnosis.    EBL: 0cc    Specimens: None    Discharged Condition: Good    Final Diagnoses: Lumbar radiculopathy, chronic [M54.16]    Disposition: Home or Self Care    Hospital Course: No complications, uneventful    Outcome of Hospitalization, Treatment, Procedure, or Surgery:  Patient was admitted for outpatient interventional pain management procedure. The patient tolerated the procedure well with no complications.    Follow up/Patient Instructions:  Follow up as scheduled in Pain Management office in 3-4 weeks.  Patient has received instructions and follow up date and time.    Medications:  Continue previous medications

## 2024-10-01 NOTE — LETTER
"Keshia Verdina" Joao was seen and treated in our department on 10/1/2024. Please excuse her from work on 10/1/2024 and 10/2/2024.  She may return to work on 10/3/2024.    If you have any questions or concerns, please don't hesitate to call.      Thanks,      [facility]      "

## 2024-10-08 RX ORDER — ACETAMINOPHEN AND CODEINE PHOSPHATE 300; 30 MG/1; MG/1
1 TABLET ORAL EVERY 4 HOURS PRN
Qty: 60 TABLET | Refills: 0 | Status: CANCELLED | OUTPATIENT
Start: 2024-10-08 | End: 2024-11-07

## 2024-10-08 NOTE — PROGRESS NOTES
Subjective:         Patient ID: Keshia Shepherd is a 39 y.o. female.    Chief Complaint: Back Pain and Shoulder Pain (Right )        Pain  This is a chronic problem. The current episode started more than 1 year ago. The problem occurs daily. The problem has been gradually improving. Associated symptoms include arthralgias, myalgias and neck pain. Pertinent negatives include no anorexia, chest pain, chills, coughing, diaphoresis, fever, sore throat, vertigo or vomiting.     Review of Systems   Constitutional:  Negative for activity change, appetite change, chills, diaphoresis, fever and unexpected weight change.   HENT:  Negative for drooling, ear discharge, ear pain, facial swelling, nosebleeds, sore throat, trouble swallowing, voice change and goiter.    Eyes:  Negative for photophobia, pain, discharge, redness and visual disturbance.   Respiratory:  Negative for apnea, cough, choking, chest tightness, shortness of breath, wheezing and stridor.    Cardiovascular:  Negative for chest pain, palpitations and leg swelling.   Gastrointestinal:  Negative for abdominal distention, anorexia, diarrhea, rectal pain, vomiting and fecal incontinence.   Endocrine: Negative for cold intolerance, heat intolerance, polydipsia, polyphagia and polyuria.   Genitourinary:  Negative for bladder incontinence, dysuria, flank pain, frequency, hematuria and hot flashes.   Musculoskeletal:  Positive for arthralgias, back pain, leg pain, myalgias, neck pain and neck stiffness. Negative for gait problem and joint deformity.   Integumentary:  Negative for color change and pallor.   Allergic/Immunologic: Negative for immunocompromised state.   Neurological:  Negative for dizziness, vertigo, seizures, syncope, facial asymmetry, speech difficulty, light-headedness, memory loss and coordination difficulties.   Hematological:  Negative for adenopathy. Does not bruise/bleed easily.   Psychiatric/Behavioral:  Negative for agitation, behavioral  problems, confusion, decreased concentration, dysphoric mood, hallucinations, self-injury and suicidal ideas. The patient is not nervous/anxious and is not hyperactive.            Past Medical History:   Diagnosis Date    Asthma     Cervical radiculopathy     Chronic pain syndrome     Depressive disorder     Fibromyalgia     GERD (gastroesophageal reflux disease)     Hyperlipidemia     Osteoarthritis     Palpitations     Radiculopathy of cervical region      Past Surgical History:   Procedure Laterality Date    CARPAL TUNNEL RELEASE Bilateral     EPIDURAL STEROID INJECTION N/A 12/29/2022    Procedure: Injection, Steroid, Epidural, L3/4;  Surgeon: Vanessa Walters MD;  Location: [facility];  Service: Pain Management;  Laterality: N/A;    EPIDURAL STEROID INJECTION N/A 9/5/2023    Procedure: Injection, Steroid, Epidural, L3/4;  Surgeon: Vanessa Walters MD;  Location: [facility];  Service: Pain Management;  Laterality: N/A;    EPIDURAL STEROID INJECTION N/A 1/23/2024    Procedure: Injection, Steroid, Epidural, L3/4;  Surgeon: Vanessa Walters MD;  Location: [facility];  Service: Pain Management;  Laterality: N/A;    EPIDURAL STEROID INJECTION INTO LUMBAR SPINE Bilateral 10/1/2024    Procedure: L3-4 CARMEN;  Surgeon: Vanessa Walters MD;  Location: [facility];  Service: Pain Management;  Laterality: Bilateral;    HYSTERECTOMY      INJECTION OF ANESTHETIC AGENT INTO SACROILIAC JOINT Bilateral 5/9/2023    Procedure: BLOCK, SACROILIAC JOINT;  Surgeon: Vanessa Walters MD;  Location: [facility];  Service: Pain Management;  Laterality: Bilateral;    INJECTION OF ANESTHETIC AGENT INTO SACROILIAC JOINT Bilateral 6/6/2023    Procedure: BLOCK, SACROILIAC JOINT;  Surgeon: Vanessa Walters MD;  Location: [facility];  Service: Pain Management;  Laterality: Bilateral;     Social History     Socioeconomic History    Marital status: Single   Tobacco Use    Smoking status: Never      "Passive exposure: Past    Smokeless tobacco: Never   Substance and Sexual Activity    Alcohol use: Never    Drug use: Not Currently    Sexual activity: Yes     Partners: Male     Family History   Problem Relation Name Age of Onset    Arthritis Mother      Cancer Mother      Depression Mother      Diabetes Mother      Heart disease Mother      Hypertension Mother      Stroke Mother      Asthma Daughter      Arthritis Maternal Grandmother      Cancer Maternal Grandmother      Depression Maternal Grandmother      Diabetes Maternal Grandmother      Hypertension Maternal Grandmother      Heart disease Maternal Grandfather      Hypertension Maternal Grandfather      Cancer Paternal Grandmother      Depression Paternal Grandmother      Diabetes Paternal Grandmother       Review of patient's allergies indicates:   Allergen Reactions    Opioids - morphine analogues     Pcn [penicillins]     Pineapple Rash        Objective:  Vitals:    10/15/24 0845 10/15/24 0846   BP: 123/75    Pulse: 63    Resp: 16    Weight: 71.2 kg (156 lb 15.5 oz)    Height: 5' 6" (1.676 m)    PainSc:   8   8               Physical Exam  Vitals and nursing note reviewed. Exam conducted with a chaperone present.   Constitutional:       General: She is awake. She is not in acute distress.     Appearance: Normal appearance. She is not ill-appearing, toxic-appearing or diaphoretic.   HENT:      Head: Normocephalic and atraumatic.      Nose: Nose normal.      Mouth/Throat:      Mouth: Mucous membranes are moist.      Pharynx: Oropharynx is clear.   Eyes:      Conjunctiva/sclera: Conjunctivae normal.      Pupils: Pupils are equal, round, and reactive to light.   Cardiovascular:      Rate and Rhythm: Normal rate.   Pulmonary:      Effort: Pulmonary effort is normal. No respiratory distress.   Abdominal:      Palpations: Abdomen is soft.   Musculoskeletal:         General: No deformity or signs of injury. Normal range of motion.      Cervical back: Normal range " of motion and neck supple.   Skin:     General: Skin is warm and dry.   Neurological:      General: No focal deficit present.      Mental Status: She is alert and oriented to person, place, and time. Mental status is at baseline.      Cranial Nerves: No cranial nerve deficit (II-XII).   Psychiatric:         Mood and Affect: Mood normal.         Behavior: Behavior normal. Behavior is cooperative.         Thought Content: Thought content normal.           [place] Fluoro for Pain Management  See OP Notes for results.     IMPRESSION: See OP Notes for results.     This procedure was auto-finalized by: Virtual Radiologist         Office Visit on 08/28/2024   Component Date Value Ref Range Status    POC Amphetamines 08/28/2024 Negative  Negative, Inconclusive Final    POC Barbiturates 08/28/2024 Negative  Negative, Inconclusive Final    POC Benzodiazepines 08/28/2024 Negative  Negative, Inconclusive Final    POC Cocaine 08/28/2024 Negative  Negative, Inconclusive Final    POC THC 08/28/2024 Negative  Negative, Inconclusive Final    POC Methadone 08/28/2024 Negative  Negative, Inconclusive Final    POC Methamphetamine 08/28/2024 Negative  Negative, Inconclusive Final    POC Opiates 08/28/2024 Negative  Negative, Inconclusive Final    POC Oxycodone 08/28/2024 Negative  Negative, Inconclusive Final    POC Phencyclidine 08/28/2024 Negative  Negative, Inconclusive Final    POC Methylenedioxymethamphetamine * 08/28/2024 Negative  Negative, Inconclusive Final    POC Tricyclic Antidepressants 08/28/2024 Negative  Negative, Inconclusive Final    POC Buprenorphine 08/28/2024 Negative   Final     Acceptable 08/28/2024 Yes   Final    POC Temperature (Urine) 08/28/2024 92   Final   Office Visit on 06/06/2024   Component Date Value Ref Range Status    POC Molecular Influenza A Ag 06/06/2024 Negative  Negative Final    POC Molecular Influenza B Ag 06/06/2024 Negative  Negative Final     Acceptable 06/06/2024 Yes    Final    Color, UA 06/06/2024 Yellow   Final    Spec Grav UA 06/06/2024 1.020   Final    pH, UA 06/06/2024 7.0   Final    WBC, UA 06/06/2024 Small   Final    Nitrite, UA 06/06/2024 Negative   Final    Protein, POC 06/06/2024 Negative   Final    Glucose, UA 06/06/2024 Negative   Final    Ketones, UA 06/06/2024 Negative   Final    Bilirubin, POC 06/06/2024 Negative   Final    Urobilinogen, UA 06/06/2024 0.2   Final    Blood, UA 06/06/2024 Negative   Final    Sodium 06/06/2024 140  136 - 145 mmol/L Final    Potassium 06/06/2024 4.5  3.5 - 5.1 mmol/L Final    Chloride 06/06/2024 108 (H)  98 - 107 mmol/L Final    CO2 06/06/2024 30  21 - 32 mmol/L Final    Anion Gap 06/06/2024 7  7 - 16 mmol/L Final    Glucose 06/06/2024 99  74 - 106 mg/dL Final    BUN 06/06/2024 6 (L)  7 - 18 mg/dL Final    Creatinine 06/06/2024 0.81  0.55 - 1.02 mg/dL Final    BUN/Creatinine Ratio 06/06/2024 7  6 - 20 Final    Calcium 06/06/2024 9.9  8.5 - 10.1 mg/dL Final    Total Protein 06/06/2024 7.9  6.4 - 8.2 g/dL Final    Albumin 06/06/2024 4.0  3.5 - 5.0 g/dL Final    Globulin 06/06/2024 3.9  2.0 - 4.0 g/dL Final    A/G Ratio 06/06/2024 1.0   Final    Bilirubin, Total 06/06/2024 0.2  >0.0 - 1.2 mg/dL Final    Alk Phos 06/06/2024 109 (H)  37 - 98 U/L Final    ALT 06/06/2024 17  13 - 56 U/L Final    AST 06/06/2024 12 (L)  15 - 37 U/L Final    eGFR 06/06/2024 95  >=60 mL/min/1.73m2 Final    TSH 06/06/2024 1.370  0.358 - 3.740 uIU/mL Final    WBC 06/06/2024 4.70  4.50 - 11.00 K/uL Final    RBC 06/06/2024 4.80  4.20 - 5.40 M/uL Final    Hemoglobin 06/06/2024 10.8 (L)  12.0 - 16.0 g/dL Final    Hematocrit 06/06/2024 36.9 (L)  38.0 - 47.0 % Final    MCV 06/06/2024 76.9 (L)  80.0 - 96.0 fL Final    MCH 06/06/2024 22.5 (L)  27.0 - 31.0 pg Final    MCHC 06/06/2024 29.3 (L)  32.0 - 36.0 g/dL Final    RDW 06/06/2024 13.2  11.5 - 14.5 % Final    Platelet Count 06/06/2024 350  150 - 400 K/uL Final    MPV 06/06/2024 9.7  9.4 - 12.4 fL Final    Neutrophils %  06/06/2024 54.9  53.0 - 65.0 % Final    Lymphocytes % 06/06/2024 37.2  27.0 - 41.0 % Final    Monocytes % 06/06/2024 6.4 (H)  2.0 - 6.0 % Final    Eosinophils % 06/06/2024 1.1  1.0 - 4.0 % Final    Basophils % 06/06/2024 0.4  0.0 - 1.0 % Final    Immature Granulocytes % 06/06/2024 0.0  0.0 - 0.4 % Final    nRBC, Auto 06/06/2024 0.0  <=0.0 % Final    Neutrophils, Abs 06/06/2024 2.58  1.80 - 7.70 K/uL Final    Lymphocytes, Absolute 06/06/2024 1.75  1.00 - 4.80 K/uL Final    Monocytes, Absolute 06/06/2024 0.30  0.00 - 0.80 K/uL Final    Eosinophils, Absolute 06/06/2024 0.05  0.00 - 0.50 K/uL Final    Basophils, Absolute 06/06/2024 0.02  0.00 - 0.20 K/uL Final    Immature Granulocytes, Absolute 06/06/2024 0.00  0.00 - 0.04 K/uL Final    nRBC, Absolute 06/06/2024 0.00  <=0.00 x10e3/uL Final    Diff Type 06/06/2024 Auto   Final    Culture, Urine 06/06/2024 Skin/Urogenital Racheal Isolated, no further workup.   Final   Office Visit on 06/05/2024   Component Date Value Ref Range Status    POC Amphetamines 06/05/2024 Negative  Negative, Inconclusive Final    POC Barbiturates 06/05/2024 Negative  Negative, Inconclusive Final    POC Benzodiazepines 06/05/2024 Negative  Negative, Inconclusive Final    POC Cocaine 06/05/2024 Negative  Negative, Inconclusive Final    POC THC 06/05/2024 Negative  Negative, Inconclusive Final    POC Methadone 06/05/2024 Negative  Negative, Inconclusive Final    POC Methamphetamine 06/05/2024 Negative  Negative, Inconclusive Final    POC Opiates 06/05/2024 Negative  Negative, Inconclusive Final    POC Oxycodone 06/05/2024 Negative  Negative, Inconclusive Final    POC Phencyclidine 06/05/2024 Negative  Negative, Inconclusive Final    POC Methylenedioxymethamphetamine * 06/05/2024 Negative  Negative, Inconclusive Final    POC Tricyclic Antidepressants 06/05/2024 Negative  Negative, Inconclusive Final    POC Buprenorphine 06/05/2024 Negative   Final     Acceptable 06/05/2024 Yes   Final     POC Temperature (Urine) 06/05/2024 92   Final         Orders Placed This Encounter   Procedures    MRI Lumbar Spine Without Contrast     Standing Status:   Future     Standing Expiration Date:   10/15/2025     Order Specific Question:   Does the patient have or ever had a pacemaker or a defibrillator (Note: Some facilities may not be able to schedule an MRI for patients with pacemakers and defibrillators. You should contact your local radiology dept to determine if this is the case.)?     Answer:   No     Order Specific Question:   Does the patient have an aneurysm or surgical clip, pump, nerve/brain stimulator, middle/inner ear prosthesis, or other metal implant or foreign object (bullet, shrapnel)? If they have a card related to their implant, ask them to bring it. Issues related to the implant may cause the MRI to be delayed.     Answer:   No     Order Specific Question:   Is the patient claustrophobic?     Answer:   No     Order Specific Question:   Will the patient require po anxiolysis or sedation?     Answer:   No     Order Specific Question:   May the Radiologist modify the order per protocol to meet the clinical needs of the patient?     Answer:   Yes     Order Specific Question:   Is this part of a Research Study?     Answer:   No     Order Specific Question:   Recist criteria?     Answer:   No     Order Specific Question:   Will this service be billed to a Worker's Comp policy?     Answer:   No     Order Specific Question:   Does the patient have on a skin patch for medication with aluminized backing?     Answer:   No     Order Specific Question:   Does the patient have any of the following conditions? Diabetes, History of Renal Disease or Hypertension requiring medical therapy?     Answer:   No     Order Specific Question:   Is the patient pregnant?     Answer:   No         Requested Prescriptions     Signed Prescriptions Disp Refills    HYDROcodone-acetaminophen (NORCO) 7.5-325 mg per tablet 60 tablet 0      Sig: Take 1 tablet by mouth every 12 (twelve) hours as needed for Pain.    HYDROcodone-acetaminophen (NORCO) 7.5-325 mg per tablet 60 tablet 0     Sig: Take 1 tablet by mouth every 12 (twelve) hours as needed for Pain.       Assessment:     1. Lumbar radiculopathy, chronic    2. Polyarthralgia             A's of Opioid Risk Assessment  Activity:Patient can perform ADL.   Analgesia:Patients pain is partially controlled by current medication. Patient has tried OTC medications such as Tylenol and Ibuprofen with out relief.   Adverse Effects: Patient denies constipation or sedation.  Aberrant Behavior:  reviewed with no aberrant drug seeking/taking behavior.  Overdose reversal drug naloxone discussed     Drug screen reviewed        MRI right shoulder [facility] November 21, 2023  Moderate acromioclavicular joint osteoarthrosis.  Partial tearing supraspinatus, infraspinatus and subscapularis tendons.  Mild-to-moderate tendinosis intra-articular biceps tendon.  Small amount of fluid in the subacromial subdeltoid bursa, small nonvisualized full-thickness tear could be present.       MRI lumbar spine [facility] November 30, 2022 L3/4 far lateral disc bulge right greater than left multiple level degenerative changes     X-ray bilateral knees [facility] November 30, 2022 degenerative changes no fracture noted    X-ray lumbar spine [facility] November 30, 2022 degenerative changes no fracture noted    X-ray hips [facility] November 30, 2022 degenerative changes no fracture noted          Plan:    Narcan January 2023    [facility]    History bilateral carpal tunnel release 2005??    Complaining increasing numbness tingling left thumb left fingers 2nd 3rd finger left hand she is concerned her carpal tunnel may be returning [facility]    She had bilateral sacroiliac injection # 2, June 6, 2023  she states she had 50% relief after procedure, his  procedure did help improve her level of function     Follow-up lumbar L3/4 CARMEN # 3, October 1, 2024  She states procedure improve her discomfort 10%    Requesting referral spine surgery evaluation lumbar radiculopathy    Will update MRI lumbar spine current MRIs 2 years old    She is requesting additional pain medication for continued lumbar radiculopathy    Hydrocodone 7.5 1 p.o. q.12 hours short-term     Resume Tylenol 3 at next visit once radiculopathy is resolved    Continue home exercise program as directed     MRI lumbar spine no contrast lumbar radiculopathy  MRI for consideration procedure/surgery    I have given the patient medically directed home exercise program    Patient has tried NSAIDs and neuromodulators (neurontin, lyrica, elavil, or cymbalta)    Referral spine surgery after completing MRI lumbar spine    Follow-up 2 months    Dr. Walters October 2025

## 2024-10-15 ENCOUNTER — OFFICE VISIT (OUTPATIENT)
Dept: PAIN MEDICINE | Facility: CLINIC | Age: 39
End: 2024-10-15
Payer: COMMERCIAL

## 2024-10-15 VITALS
HEART RATE: 63 BPM | SYSTOLIC BLOOD PRESSURE: 123 MMHG | BODY MASS INDEX: 25.22 KG/M2 | HEIGHT: 66 IN | DIASTOLIC BLOOD PRESSURE: 75 MMHG | RESPIRATION RATE: 16 BRPM | WEIGHT: 156.94 LBS

## 2024-10-15 DIAGNOSIS — M54.16 LUMBAR RADICULOPATHY, CHRONIC: Primary | Chronic | ICD-10-CM

## 2024-10-15 DIAGNOSIS — M25.50 POLYARTHRALGIA: Chronic | ICD-10-CM

## 2024-10-15 PROCEDURE — 99999 PR PBB SHADOW E&M-EST. PATIENT-LVL IV: CPT | Mod: PBBFAC,,, | Performed by: PHYSICIAN ASSISTANT

## 2024-10-15 PROCEDURE — 3078F DIAST BP <80 MM HG: CPT | Mod: CPTII,,, | Performed by: PHYSICIAN ASSISTANT

## 2024-10-15 PROCEDURE — 1159F MED LIST DOCD IN RCRD: CPT | Mod: CPTII,,, | Performed by: PHYSICIAN ASSISTANT

## 2024-10-15 PROCEDURE — 3008F BODY MASS INDEX DOCD: CPT | Mod: CPTII,,, | Performed by: PHYSICIAN ASSISTANT

## 2024-10-15 PROCEDURE — 3074F SYST BP LT 130 MM HG: CPT | Mod: CPTII,,, | Performed by: PHYSICIAN ASSISTANT

## 2024-10-15 PROCEDURE — 99214 OFFICE O/P EST MOD 30 MIN: CPT | Mod: PBBFAC | Performed by: PHYSICIAN ASSISTANT

## 2024-10-15 PROCEDURE — 99214 OFFICE O/P EST MOD 30 MIN: CPT | Mod: S$PBB,,, | Performed by: PHYSICIAN ASSISTANT

## 2024-10-15 RX ORDER — HYDROCODONE BITARTRATE AND ACETAMINOPHEN 7.5; 325 MG/1; MG/1
1 TABLET ORAL EVERY 12 HOURS PRN
Qty: 60 TABLET | Refills: 0 | Status: SHIPPED | OUTPATIENT
Start: 2024-11-14

## 2024-10-15 RX ORDER — HYDROCODONE BITARTRATE AND ACETAMINOPHEN 7.5; 325 MG/1; MG/1
1 TABLET ORAL EVERY 12 HOURS PRN
Qty: 60 TABLET | Refills: 0 | Status: SHIPPED | OUTPATIENT
Start: 2024-10-15

## 2024-10-16 ENCOUNTER — TELEPHONE (OUTPATIENT)
Dept: PAIN MEDICINE | Facility: CLINIC | Age: 39
End: 2024-10-16
Payer: COMMERCIAL

## 2024-10-16 NOTE — TELEPHONE ENCOUNTER
Good afternoon.    Just an update that this request has denied for the aforementioned reason below. A copy of the denial letter can be found in . Thanks.    Jean Claude Hahn  Pre-          Good morning.    A preliminary review has been performed that is NOT a final determination. Requests for further information and a peer to peer discussion are being sent to the ordering physician. However, if no further information is received within the regulatory time frame, this determination will become final. The notes given do not meet [employer]'s Lumbar Spine MRI guidelines. Thus, the procedure is not shown to be medically needed. A physician reviewer made this decision based on the notes given (you have back pain). Prior to an approval, the following notes should be given: doctor's notes that say you did six weeks of back exercises (physical therapy, chiropractic treatments, or medically directed home exercise program) in the last six months. We also need to know the number of visits you went to. If you did this, the notes do not show the dates that you did the exercises. We also need to know that you did not get better. [employer] Clinical Guideline 044 for Lumbar Spine MRI was used to make this decision.    Would you please let me know how D. Shows would like to proceed? Thank you.    Jean Claude Hahn  Pre-    ELIZABETH BOLAÑOS   10/16/2024   12:59 PM   Patient notified, MRI canceled

## 2024-11-05 ENCOUNTER — OFFICE VISIT (OUTPATIENT)
Dept: FAMILY MEDICINE | Facility: CLINIC | Age: 39
End: 2024-11-05
Payer: COMMERCIAL

## 2024-11-05 VITALS
HEIGHT: 66 IN | TEMPERATURE: 98 F | WEIGHT: 161.19 LBS | DIASTOLIC BLOOD PRESSURE: 75 MMHG | SYSTOLIC BLOOD PRESSURE: 111 MMHG | OXYGEN SATURATION: 99 % | HEART RATE: 69 BPM | RESPIRATION RATE: 15 BRPM | BODY MASS INDEX: 25.9 KG/M2

## 2024-11-05 DIAGNOSIS — M54.2 NECK PAIN: ICD-10-CM

## 2024-11-05 DIAGNOSIS — D64.9 ANEMIA, UNSPECIFIED TYPE: ICD-10-CM

## 2024-11-05 DIAGNOSIS — R74.8 ELEVATED ALKALINE PHOSPHATASE LEVEL: ICD-10-CM

## 2024-11-05 DIAGNOSIS — R73.03 PREDIABETES: ICD-10-CM

## 2024-11-05 DIAGNOSIS — R53.82 CHRONIC FATIGUE: Primary | ICD-10-CM

## 2024-11-05 LAB
25(OH)D3 SERPL-MCNC: 22.3 NG/ML
ALBUMIN SERPL BCP-MCNC: 3.9 G/DL (ref 3.5–5)
ALBUMIN/GLOB SERPL: 1 {RATIO}
ALP SERPL-CCNC: 116 U/L (ref 37–98)
ALT SERPL W P-5'-P-CCNC: 26 U/L (ref 13–56)
ANION GAP SERPL CALCULATED.3IONS-SCNC: 10 MMOL/L (ref 7–16)
AST SERPL W P-5'-P-CCNC: 16 U/L (ref 15–37)
BASOPHILS # BLD AUTO: 0.02 K/UL (ref 0–0.2)
BASOPHILS NFR BLD AUTO: 0.4 % (ref 0–1)
BILIRUB SERPL-MCNC: 0.3 MG/DL (ref ?–1.2)
BUN SERPL-MCNC: 7 MG/DL (ref 7–18)
BUN/CREAT SERPL: 10 (ref 6–20)
CALCIUM SERPL-MCNC: 10.2 MG/DL (ref 8.5–10.1)
CHLORIDE SERPL-SCNC: 108 MMOL/L (ref 98–107)
CO2 SERPL-SCNC: 28 MMOL/L (ref 21–32)
CREAT SERPL-MCNC: 0.69 MG/DL (ref 0.55–1.02)
DIFFERENTIAL METHOD BLD: ABNORMAL
EGFR (NO RACE VARIABLE) (RUSH/TITUS): 113 ML/MIN/1.73M2
EOSINOPHIL # BLD AUTO: 0.04 K/UL (ref 0–0.5)
EOSINOPHIL NFR BLD AUTO: 0.9 % (ref 1–4)
ERYTHROCYTE [DISTWIDTH] IN BLOOD BY AUTOMATED COUNT: 14 % (ref 11.5–14.5)
FOLATE SERPL-MCNC: 18.8 NG/ML (ref 3.1–17.5)
GLOBULIN SER-MCNC: 3.9 G/DL (ref 2–4)
GLUCOSE SERPL-MCNC: 95 MG/DL (ref 74–106)
HAV IGM SER QL: NORMAL
HBV CORE IGM SER QL: NORMAL
HBV SURFACE AG SERPL QL IA: NORMAL
HCT VFR BLD AUTO: 42.5 % (ref 38–47)
HCV AB SER QL: NORMAL
HGB BLD-MCNC: 12.1 G/DL (ref 12–16)
IMM GRANULOCYTES # BLD AUTO: 0.02 K/UL (ref 0–0.04)
IMM GRANULOCYTES NFR BLD: 0.4 % (ref 0–0.4)
IRON SATN MFR SERPL: 27 % (ref 14–50)
IRON SERPL-MCNC: 87 ΜG/DL (ref 50–170)
LYMPHOCYTES # BLD AUTO: 1.32 K/UL (ref 1–4.8)
LYMPHOCYTES NFR BLD AUTO: 29.2 % (ref 27–41)
MCH RBC QN AUTO: 23 PG (ref 27–31)
MCHC RBC AUTO-ENTMCNC: 28.5 G/DL (ref 32–36)
MCV RBC AUTO: 80.6 FL (ref 80–96)
MONOCYTES # BLD AUTO: 0.37 K/UL (ref 0–0.8)
MONOCYTES NFR BLD AUTO: 8.2 % (ref 2–6)
MPC BLD CALC-MCNC: 9.6 FL (ref 9.4–12.4)
NEUTROPHILS # BLD AUTO: 2.75 K/UL (ref 1.8–7.7)
NEUTROPHILS NFR BLD AUTO: 60.9 % (ref 53–65)
NRBC # BLD AUTO: 0 X10E3/UL
NRBC, AUTO (.00): 0 %
PLATELET # BLD AUTO: 460 K/UL (ref 150–400)
POTASSIUM SERPL-SCNC: 5.6 MMOL/L (ref 3.5–5.1)
PROT SERPL-MCNC: 7.8 G/DL (ref 6.4–8.2)
RBC # BLD AUTO: 5.27 M/UL (ref 4.2–5.4)
SODIUM SERPL-SCNC: 140 MMOL/L (ref 136–145)
T4 FREE SERPL-MCNC: 0.93 NG/DL (ref 0.76–1.46)
TIBC SERPL-MCNC: 325 ΜG/DL (ref 250–450)
TSH SERPL DL<=0.005 MIU/L-ACNC: 0.85 UIU/ML (ref 0.36–3.74)
VIT B12 SERPL-MCNC: 481 PG/ML (ref 193–986)
WBC # BLD AUTO: 4.52 K/UL (ref 4.5–11)

## 2024-11-05 PROCEDURE — 1160F RVW MEDS BY RX/DR IN RCRD: CPT | Mod: CPTII,,, | Performed by: NURSE PRACTITIONER

## 2024-11-05 PROCEDURE — 83540 ASSAY OF IRON: CPT | Mod: ,,, | Performed by: CLINICAL MEDICAL LABORATORY

## 2024-11-05 PROCEDURE — 1159F MED LIST DOCD IN RCRD: CPT | Mod: CPTII,,, | Performed by: NURSE PRACTITIONER

## 2024-11-05 PROCEDURE — 3078F DIAST BP <80 MM HG: CPT | Mod: CPTII,,, | Performed by: NURSE PRACTITIONER

## 2024-11-05 PROCEDURE — 82607 VITAMIN B-12: CPT | Mod: ,,, | Performed by: CLINICAL MEDICAL LABORATORY

## 2024-11-05 PROCEDURE — 83550 IRON BINDING TEST: CPT | Mod: ,,, | Performed by: CLINICAL MEDICAL LABORATORY

## 2024-11-05 PROCEDURE — 99214 OFFICE O/P EST MOD 30 MIN: CPT | Mod: ,,, | Performed by: NURSE PRACTITIONER

## 2024-11-05 PROCEDURE — 83036 HEMOGLOBIN GLYCOSYLATED A1C: CPT | Mod: ,,, | Performed by: CLINICAL MEDICAL LABORATORY

## 2024-11-05 PROCEDURE — 80074 ACUTE HEPATITIS PANEL: CPT | Mod: ,,, | Performed by: CLINICAL MEDICAL LABORATORY

## 2024-11-05 PROCEDURE — 80050 GENERAL HEALTH PANEL: CPT | Mod: ,,, | Performed by: CLINICAL MEDICAL LABORATORY

## 2024-11-05 PROCEDURE — 84439 ASSAY OF FREE THYROXINE: CPT | Mod: ,,, | Performed by: CLINICAL MEDICAL LABORATORY

## 2024-11-05 PROCEDURE — 82746 ASSAY OF FOLIC ACID SERUM: CPT | Mod: ,,, | Performed by: CLINICAL MEDICAL LABORATORY

## 2024-11-05 PROCEDURE — 3008F BODY MASS INDEX DOCD: CPT | Mod: CPTII,,, | Performed by: NURSE PRACTITIONER

## 2024-11-05 PROCEDURE — 82306 VITAMIN D 25 HYDROXY: CPT | Mod: ,,, | Performed by: CLINICAL MEDICAL LABORATORY

## 2024-11-05 PROCEDURE — 3074F SYST BP LT 130 MM HG: CPT | Mod: CPTII,,, | Performed by: NURSE PRACTITIONER

## 2024-11-05 NOTE — PROGRESS NOTES
"[facility]    Chief Complaint      Chief Complaint   Patient presents with    Establish Care     Patient states she does have some fatigue and neck tenderness , she states she has she " unusual knots " come up on her left forearm.        History of Present Illness      Keshia Shepherd is a 39 y.o. female. She  has a past medical history of Asthma, Cervical radiculopathy, Chronic pain syndrome, Depressive disorder, Fibromyalgia, GERD (gastroesophageal reflux disease), Hyperlipidemia, Osteoarthritis, Palpitations, and Radiculopathy of cervical region., who presents today for Establishing Care and has complaints of neck pain, chronic fatigue, easy bruising, excessive urination/thirst.    Past Medical History:  Past Medical History:   Diagnosis Date    Asthma     Cervical radiculopathy     Chronic pain syndrome     Depressive disorder     Fibromyalgia     GERD (gastroesophageal reflux disease)     Hyperlipidemia     Osteoarthritis     Palpitations     Radiculopathy of cervical region        Past Surgical History:   has a past surgical history that includes Hysterectomy; Carpal tunnel release (Bilateral); Epidural steroid injection (N/A, 12/29/2022); Injection of anesthetic agent into sacroiliac joint (Bilateral, 5/9/2023); Injection of anesthetic agent into sacroiliac joint (Bilateral, 6/6/2023); Epidural steroid injection (N/A, 9/5/2023); Epidural steroid injection (N/A, 1/23/2024); and Epidural steroid injection into lumbar spine (Bilateral, 10/1/2024).    Social History:  Social History     Tobacco Use    Smoking status: Never     Passive exposure: Past    Smokeless tobacco: Never   Substance Use Topics    Alcohol use: Never    Drug use: Not Currently       I personally reviewed all past medical, surgical, and social.     Review of Systems   Constitutional:  Positive for fatigue. Negative for unexpected weight change.   HENT:  Negative for trouble swallowing.    Eyes:  Negative for visual disturbance. "   Respiratory:  Negative for cough and shortness of breath.    Cardiovascular: Negative.    Gastrointestinal:  Negative for abdominal pain, constipation, diarrhea, nausea and vomiting.   Endocrine: Positive for polydipsia and polyuria. Negative for polyphagia.   Genitourinary:  Positive for frequency. Negative for difficulty urinating, menstrual problem and vaginal discharge.   Musculoskeletal:  Negative for gait problem.   Skin: Negative.    Neurological:  Negative for dizziness, light-headedness and headaches.   Hematological:  Bruises/bleeds easily.        Medications:  Outpatient Encounter Medications as of 11/5/2024   Medication Sig Dispense Refill    albuterol (PROVENTIL/VENTOLIN HFA) 90 mcg/actuation inhaler Inhale 2 puffs into the lungs every 6 (six) hours as needed.      celecoxib (CELEBREX) 200 MG capsule Take 1 capsule (200 mg total) by mouth 2 (two) times daily. 60 capsule 1    HYDROcodone-acetaminophen (NORCO) 7.5-325 mg per tablet Take 1 tablet by mouth every 12 (twelve) hours as needed for Pain. 60 tablet 0    [START ON 11/14/2024] HYDROcodone-acetaminophen (NORCO) 7.5-325 mg per tablet Take 1 tablet by mouth every 12 (twelve) hours as needed for Pain. 60 tablet 0    mirtazapine (REMERON) 30 MG tablet Take 1 tablet (30 mg total) by mouth every evening. 30 tablet 0    ondansetron (ZOFRAN-ODT) 4 MG TbDL Take 1 tablet (4 mg total) by mouth every 6 (six) hours as needed (nausea). 20 tablet 1    ondansetron (ZOFRAN-ODT) 4 MG TbDL Take 1 tablet (4 mg total) by mouth every 8 (eight) hours as needed (N/V). 15 tablet 0    pantoprazole (PROTONIX) 40 MG tablet Take 1 tablet (40 mg total) by mouth once daily. 90 tablet 3    polyethylene glycol (GLYCOLAX) 17 gram/dose powder Take 17 g by mouth once daily. (Patient not taking: Reported on 11/5/2024) 595 g 5    polyethylene glycol (GOLYTELY) 236-22.74-6.74 -5.86 gram suspension TAKE 4000 ML BY MOUTH ONCE DAILY (Patient not taking: Reported on 11/5/2024)    "    Facility-Administered Encounter Medications as of 11/5/2024   Medication Dose Route Frequency Provider Last Rate Last Admin    LIDOcaine-EPINEPHrine 1%-1:100,000 injection 1 mL  1 mL Intradermal 1 time in Clinic/HOD Erika Trores MD           Allergies:  Review of patient's allergies indicates:   Allergen Reactions    Opioids - morphine analogues     Pcn [penicillins]     Pineapple Rash       Health Maintenance:    There is no immunization history on file for this patient.   Health Maintenance   Topic Date Due    Lipid Panel  Never done    TETANUS VACCINE  Never done    Hepatitis C Screening  Completed        Physical Exam      Vital Signs  Temp: 98.4 °F (36.9 °C)  Temp Source: Oral  Pulse: 69  Resp: 15  SpO2: 99 %  BP: 111/75  BP Location: Left arm  Patient Position: Sitting  Pain Score:   7  Pain Loc: Shoulder  Height and Weight  Height: 5' 6" (167.6 cm)  Weight: 73.1 kg (161 lb 3.2 oz)  BSA (Calculated - sq m): 1.85 sq meters  BMI (Calculated): 26  Weight in (lb) to have BMI = 25: 154.6]    Physical Exam  Vitals and nursing note reviewed.   Constitutional:       Appearance: Normal appearance. She is well-developed.   HENT:      Head: Normocephalic.      Right Ear: Hearing normal.      Left Ear: Hearing normal.      Nose: Nose normal.   Eyes:      General: Lids are normal.      Conjunctiva/sclera: Conjunctivae normal.   Neck:      Thyroid: Thyromegaly present. No thyroid mass or thyroid tenderness.   Cardiovascular:      Rate and Rhythm: Normal rate and regular rhythm.      Heart sounds: Normal heart sounds.   Pulmonary:      Effort: Pulmonary effort is normal.      Breath sounds: Normal breath sounds.   Musculoskeletal:         General: Tenderness present. Normal range of motion.        Arms:       Cervical back: Normal range of motion and neck supple.      Right lower leg: No edema.      Left lower leg: No edema.      Comments: Small cyst felt on L FA    In tattoed areas on bilateral FA's- sporadic areas in " the ink are raised and keloid appearing- states has had these tattoos for many years   Skin:     General: Skin is warm and dry.   Neurological:      Mental Status: She is alert and oriented to person, place, and time.      Gait: Gait is intact.   Psychiatric:         Behavior: Behavior is cooperative.          Laboratory:  CBC:  Recent Labs   Lab 11/03/23  1328 12/14/23  0956 06/06/24  1740   WBC 4.33 L 4.53 4.70   RBC 5.31 5.27 4.80   Hemoglobin 12.4 12.0 10.8 L   Hematocrit 41.3 39.4 36.9 L   Platelet Count 342 360 350   MCV 77.8 L 74.8 L 76.9 L   MCH 23.4 L 22.8 L 22.5 L   MCHC 30.0 L 30.5 L 29.3 L     CMP:  Recent Labs   Lab 11/30/22  1038 11/03/23  1328 06/06/24  1740   Glucose 86 112 H 99   Calcium 9.9 9.6 9.9   Albumin 4.2 4.1 4.0   Total Protein 8.0 8.0 7.9   Sodium 139 141 140   Potassium 4.1 3.9 4.5   CO2 29 31 30   Chloride 105 107 108 H   BUN 9 7 6 L   Alk Phos 125 H 127 H 109 H   ALT 24 20 17   AST 12 L 8 L 12 L   Bilirubin, Total 0.3 0.1 0.2     LIPIDS:  Recent Labs   Lab 11/30/22  1038 11/03/23  1328 06/06/24  1740   TSH 1.740 1.460 1.370     TSH:  Recent Labs   Lab 11/30/22  1038 11/03/23  1328 06/06/24  1740   TSH 1.740 1.460 1.370     A1C:  Recent Labs   Lab 11/03/23  1328   Hemoglobin A1C 5.8       Assessment/Plan     Keshia Shepherd is a 39 y.o.female with:     1. Chronic fatigue  -     CBC Auto Differential; Future; Expected date: 11/05/2024  -     Iron and TIBC; Future; Expected date: 11/05/2024  -     Vitamin B12 & Folate; Future; Expected date: 11/05/2024  -     Thyroid Panel; Future; Expected date: 11/05/2024  -     Vitamin D; Future; Expected date: 11/05/2024  -     Comprehensive Metabolic Panel; Future; Expected date: 11/05/2024  -     Hepatitis Panel, Acute; Future; Expected date: 11/05/2024  -     Alkaline Phosphatase, Isoenzymes; Future; Expected date: 11/05/2024    2. Anemia, unspecified type  -     CBC Auto Differential; Future; Expected date: 11/05/2024  -     Iron and TIBC; Future;  Expected date: 11/05/2024  -     Vitamin B12 & Folate; Future; Expected date: 11/05/2024  -     Comprehensive Metabolic Panel; Future; Expected date: 11/05/2024    3. Neck pain    4. Prediabetes  -     Hemoglobin A1C; Future; Expected date: 11/05/2024  -     Comprehensive Metabolic Panel; Future; Expected date: 11/05/2024    5. Elevated alkaline phosphatase level  -     Comprehensive Metabolic Panel; Future; Expected date: 11/05/2024  -     Hepatitis Panel, Acute; Future; Expected date: 11/05/2024  -     Alkaline Phosphatase, Isoenzymes; Future; Expected date: 11/05/2024         Review of patient's previous labs shows anemia, prediabetes, elevated Alk Phos level  Will call with lab results    Total time spent face-to-face and non-face-to-face coordinating care for this encounter was: 30 minutes     Chronic conditions status updated as per HPI.  Other than changes above, cont current medications and maintain follow up with specialists.  Return to clinic prn if symptoms worsen or fail to improve.    Shobha Hoover, RAULP  [facility]

## 2024-11-06 LAB
EST. AVERAGE GLUCOSE BLD GHB EST-MCNC: 123 MG/DL
HBA1C MFR BLD HPLC: 5.9 % (ref 4.5–6.6)

## 2024-11-08 LAB
ALP BONE CFR SERPL: 38.4 % (ref 19.1–67.7)
ALP BONE SERPL-CCNC: 44.9 IU/L (ref 12.1–42.7)
ALP INTEST CFR SERPL: 7.7 % (ref 0–20.6)
ALP INTEST SERPL-CCNC: 9 IU/L (ref 0–11)
ALP LIVER 1 CFR SERPL: 51.9 % (ref 27.8–76.3)
ALP LIVER 1 SERPL-CCNC: 60.7 IU/L (ref 16.2–70.2)
ALP LIVER 2 CFR SERPL: 2 % (ref 0–8)
ALP LIVER 2 SERPL-CCNC: 2.3 IU/L (ref 0–5.8)
ALP PLAC SERPL QL: ABNORMAL
ALP SERPL-CCNC: 117 U/L (ref 35–104)

## 2024-12-17 ENCOUNTER — PATIENT MESSAGE (OUTPATIENT)
Dept: FAMILY MEDICINE | Facility: CLINIC | Age: 39
End: 2024-12-17
Payer: COMMERCIAL

## (undated) DEVICE — SOL CONTINU-FLO SET 2 LAV

## (undated) DEVICE — GLOVE PROTEXIS PI SYN SURG 6.5

## (undated) DEVICE — CATH IV 22G X 1 AUTOGUARD

## (undated) DEVICE — SET EXTENSION CLEARLINK 2INJ

## (undated) DEVICE — Device

## (undated) DEVICE — SYR EPILOR LUER-LOK LOR 7ML

## (undated) DEVICE — APPLICATOR CHLORAPREP ORN 26ML

## (undated) DEVICE — GLOVE SENSICARE PI SURG 6.5

## (undated) DEVICE — TRAY NERVE BLOCK UNIV 10/CA

## (undated) DEVICE — NDL SPINAL CLR HUB 22GX4 3/4

## (undated) DEVICE — TRAY EPIDURAL SINGLE SHOT PMA

## (undated) DEVICE — KIT IV START RUSH

## (undated) DEVICE — CATH IV INTROCAN 24G X 3/4

## (undated) DEVICE — SLIPPER FALL PREV YELLOW XLG

## (undated) DEVICE — SLIPPER FALL PREV YEL BARIAT

## (undated) DEVICE — SET EXT IV CATH ONE LINK 8.5IN

## (undated) DEVICE — NDL TUOHY EPIDURAL 20GX3.5IN

## (undated) DEVICE — KIT IV START